# Patient Record
Sex: FEMALE | Race: ASIAN
[De-identification: names, ages, dates, MRNs, and addresses within clinical notes are randomized per-mention and may not be internally consistent; named-entity substitution may affect disease eponyms.]

---

## 2018-09-04 ENCOUNTER — HOSPITAL ENCOUNTER (EMERGENCY)
Dept: HOSPITAL 47 - EC | Age: 83
Discharge: HOME | End: 2018-09-04
Payer: MEDICARE

## 2018-09-04 VITALS
RESPIRATION RATE: 18 BRPM | HEART RATE: 61 BPM | TEMPERATURE: 97.8 F | DIASTOLIC BLOOD PRESSURE: 72 MMHG | SYSTOLIC BLOOD PRESSURE: 172 MMHG

## 2018-09-04 DIAGNOSIS — R40.2142: ICD-10-CM

## 2018-09-04 DIAGNOSIS — S09.90XA: Primary | ICD-10-CM

## 2018-09-04 DIAGNOSIS — E11.9: ICD-10-CM

## 2018-09-04 DIAGNOSIS — R40.2252: ICD-10-CM
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
## 2018-09-04 DIAGNOSIS — R40.2362: ICD-10-CM

## 2018-09-04 DIAGNOSIS — M25.561: ICD-10-CM
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
## 2018-09-04 DIAGNOSIS — Z79.899: ICD-10-CM

## 2018-09-04 DIAGNOSIS — Z79.84: ICD-10-CM

## 2018-09-04 PROCEDURE — 70486 CT MAXILLOFACIAL W/O DYE: CPT

## 2018-09-04 PROCEDURE — 99284 EMERGENCY DEPT VISIT MOD MDM: CPT

## 2018-09-04 PROCEDURE — 72125 CT NECK SPINE W/O DYE: CPT

## 2018-09-04 PROCEDURE — 70450 CT HEAD/BRAIN W/O DYE: CPT

## 2018-09-04 NOTE — CT
EXAMINATION TYPE: CT facial bones wo con

 

DATE OF EXAM: 9/4/2018

 

COMPARISON: None

 

HISTORY: fall

 

CT DLP: 429.4 mGycm

Automated exposure control for dose reduction was used.

 

TECHNIQUE: CT scan of the sinuses is performed without contrast, axial images are obtained, coronal r
eformatted images are also reviewed.

 

FINDINGS: The  paranasal sinuses including the frontal, ethmoid, sphenoid, and maxillary sinuses bila
terally are well-aerated mild changes of chronic sinusitis.  The ostiomeatal complex is patent bilate
rally on the coronal images. 

 

Visualized portion of mastoid air cells show no abnormal opacification.  The globes are intact bilate
rally.  

 

IMPRESSION: 

1. No acute fracture.

2. Changes of mild chronic sinusitis.

## 2018-09-04 NOTE — ED
General Adult HPI





- General


Chief complaint: Fall


Stated complaint: Fall


Time Seen by Provider: 09/04/18 10:09


Source: patient, family, RN notes reviewed


Mode of arrival: wheelchair


Limitations: language barrier





- History of Present Illness


Initial comments: 





84-year-old female presents to the emergency department for a chief complaint 

of fall occurring one day ago.  Family helped to translate for patient.  

Apparently, patient was walking in the living room yesterday when she fell on 

her right knee.  Patient has had pain in the right knee since that time.  

Patient denies any pain in the foot ankle or right hip.  Patient denies any 

back pain.  Patient does admit to hitting her head against the windowsill as 

well as her face.  Patient denies being on blood thinners.  Patient denies any 

loss of consciousness.  Patient denies any headache at this time.  Patient 

recently had eye surgery. Patient has no other complaints at this time 

including shortness of breath, chest pain, abdominal pain, nausea or vomiting, 

headache, or visual changes.


Location: lower extremity (right knee)


Radiation: non-radiation


Severity scale (1-10): 6


Quality: aching


Consistency: constant, other


Worsens with: movement (flexion/weight bearing)


Associated Symptoms: denies other symptoms





- Related Data


 Home Medications











 Medication  Instructions  Recorded  Confirmed


 


ALPRAZolam [Xanax] 0.5 mg PO HS 09/04/18 09/04/18


 


Atorvastatin [Lipitor] 10 mg PO HS 09/04/18 09/04/18


 


Ferrous Sulfate [Feosol] 325 mg PO DAILY 09/04/18 09/04/18


 


Glucerna Shake 1 can PO BID-W/MEALS 09/04/18 09/04/18


 


Valsartan [Diovan] 80 mg PO DAILY 09/04/18 09/04/18


 


metFORMIN HCL [Glucophage] 500 mg PO DAILY 09/04/18 09/04/18











 Allergies











Allergy/AdvReac Type Severity Reaction Status Date / Time


 


No Known Allergies Allergy   Verified 09/04/18 10:05














Review of Systems


ROS Statement: 


Those systems with pertinent positive or pertinent negative responses have been 

documented in the HPI.





ROS Other: All systems not noted in ROS Statement are negative.





Past Medical History


Past Medical History: Diabetes Mellitus


History of Any Multi-Drug Resistant Organisms: None Reported


Past Psychological History: No Psychological Hx Reported


Smoking Status: Never smoker


Past Alcohol Use History: None Reported


Past Drug Use History: None Reported





General Exam


Limitations: language barrier


General appearance: alert


Head exam: Present: atraumatic, normocephalic, normal inspection, other (

ecchymosis to nasal bridge, forehead )


Eye exam: Present: normal appearance, PERRL, EOMI, other (negative raccoon sign)

.  Absent: scleral icterus, conjunctival injection, nystagmus, periorbital 

swelling, periorbital tenderness


ENT exam: Present: normal exam, normal oropharynx, mucous membranes moist, TM's 

normal bilaterally, normal external ear exam (negative frye sign)


Neck exam: Present: normal inspection, full ROM.  Absent: tenderness, 

meningismus, lymphadenopathy


Respiratory exam: Present: normal lung sounds bilaterally.  Absent: respiratory 

distress, wheezes, rales, rhonchi, stridor


Cardiovascular Exam: Present: regular rate, normal rhythm, normal heart sounds.

  Absent: systolic murmur, diastolic murmur, rubs, gallop, clicks


GI/Abdominal exam: Present: soft, normal bowel sounds.  Absent: distended, 

tenderness, guarding, rebound, rigid


Extremities exam: Present: tenderness (Mild tenderness to the right knee, 

generalized.  No tenderness to the right hip, femur, tib-fib, ankle or foot.), 

normal capillary refill (Capillary refill less than 2 seconds and pedal pulse 2

+ in the right lower extremity), other (Sensation intact in right lower 

extremity.).  Absent: full ROM (Patient has full range of motion of the right 

hip or ankle and foot.  Patient has 90 flexion of the right knee with 

increased pain with flexion.), pedal edema, joint swelling (No erythema or 

edema noted to the right knee, no ecchymosis.), calf tenderness (No tenderness 

in the right calf, no erythema, edema, or increased warmth compared to left 

calf.)


Back exam: Absent: tenderness


Neurological exam: Present: alert, oriented X3, CN II-XII intact, normal gait.  

Absent: motor sensory deficit


  ** Expanded


Patient oriented to: Present: person, place, time


Speech: Present: fluid speech


Eye Response: (4) open spontaneously


Motor Response: (6) obeys commands


Verbal Response: (5) oriented


Molly Total: 15


Psychiatric exam: Present: normal affect, normal mood





Course


 Vital Signs











  09/04/18





  10:02


 


Temperature 98.1 F


 


Pulse Rate 64


 


Respiratory 20





Rate 


 


Blood Pressure 149/70


 


O2 Sat by Pulse 98





Oximetry 














Medical Decision Making





- Medical Decision Making





84-year-old female presents to the emergency department for a chief complaint 

of fall occurring yesterday.  Patient complains of pain in the right knee 

worsened with ambulation and flexion of the knee.  Patient also states she had 

her head on a windowsill.  No focal neuro deficits.  GCS 15.  Patient is 

ambulating in the emergency department. X-ray of the right knee shows no 

fracture or dislocation evident.  Suprapatellar joint effusion suspected.  CT 

facial bones shows no acute fracture.  CT brain shows no acute fracture or 

dislocation evident in the cervical spine.  No acute intracranial hemorrhage, 

mass effect or midline shift seen on brain CT.  Degenerative and nonspecific 

white matter changes most typical for old remote microvascular ischemia.  At 

this time patient can follow up outpatient.  I did offer a orthopedic referral 

which family refused and stated they would follow up with primary care.  Ace 

wrap was applied to the right knee.  On reevaluation patient is in no distress 

and is alert and pleasant.  She will return to the emergency Department if she 

has any worsening symptoms.  Otherwise she will rest ice and elevate the right 

knee and follow-up with primary care in 1-2 days.





Disposition


Clinical Impression: 


 Head injury, Knee pain, right





Disposition: HOME SELF-CARE


Condition: Good


Instructions:  Fall Prevention for Older Adults (ED), Knee Pain (ED)


Additional Instructions: 


Please rest, ice, and elevate the right knee.  Use Ace wrap as needed.  Monitor 

for worsening symptoms such as headache, confusion.  Follow-up with primary 

care in 1-2 days.  Return to the emergency department if you have any worsening 

symptoms.


Is patient prescribed a controlled substance at d/c from ED?: No


Referrals: 


Kamlesh Rmaos MD [Primary Care Provider] - 1-2 days


Time of Disposition: 11:32

## 2018-09-04 NOTE — XR
Right knee

 

HISTORY: Trauma and pain

 

3 views of the right knee

 

Marginal spurring is present in the medial compartment with joint space loss. Alignment is maintained
. Bone mineralization is reduced. Marginal spurring also present at the patellofemoral joint, suprapa
tellar joint effusion suspected. There is overlying artifact present.

 

IMPRESSION: No fracture or dislocation is evident, follow-up as indicated.

## 2018-09-04 NOTE — CT
EXAMINATION TYPE: CT brain cspine wo con

 

DATE OF EXAM: 9/4/2018

 

COMPARISON: None

 

HISTORY: fall

 

CT DLP: 1774.2 mGycm

Automated exposure control for dose reduction was used.

 

TECHNIQUE: CT scan of the head and cervical spine are performed without contrast.

 

FINDINGS:   Moderate generalized degenerative change of the greater frontal component. Patchy conflue
nt areas of low-attenuation are compatible with old microvascular ischemia.

 

Calvarium intact

No acute hemorrhage. Changes of chronic sinusitis noted. Intracranial atherosclerotic changes are see
n.

 

Anatomic alignment noted. Assessment spinal canal limited due to resolution I, technique and artifact
. Multilevel mild degenerative disc disease and facet arthropathy. Odontoid intact.

 

IMPRESSION:

1. There is no acute fracture or dislocation evident in the cervical spine. Multilevel degenerative d
isc disease and facet arthropathy.

2. No acute intracranial hemorrhage, mass effect, or midline shift is seen. Degenerative and nonspeci
fic white matter changes most typical remote microvascular ischemia.

## 2019-08-12 ENCOUNTER — HOSPITAL ENCOUNTER (INPATIENT)
Dept: HOSPITAL 47 - EC | Age: 84
LOS: 3 days | Discharge: HOME HEALTH | DRG: 292 | End: 2019-08-15
Payer: MEDICARE

## 2019-08-12 VITALS — BODY MASS INDEX: 18.8 KG/M2

## 2019-08-12 DIAGNOSIS — I11.0: Primary | ICD-10-CM

## 2019-08-12 DIAGNOSIS — I50.9: ICD-10-CM

## 2019-08-12 DIAGNOSIS — Z79.899: ICD-10-CM

## 2019-08-12 DIAGNOSIS — Z71.6: ICD-10-CM

## 2019-08-12 DIAGNOSIS — N17.9: ICD-10-CM

## 2019-08-12 DIAGNOSIS — R26.81: ICD-10-CM

## 2019-08-12 DIAGNOSIS — F17.210: ICD-10-CM

## 2019-08-12 DIAGNOSIS — Z79.82: ICD-10-CM

## 2019-08-12 DIAGNOSIS — I08.3: ICD-10-CM

## 2019-08-12 DIAGNOSIS — E78.5: ICD-10-CM

## 2019-08-12 DIAGNOSIS — D64.9: ICD-10-CM

## 2019-08-12 DIAGNOSIS — H54.62: ICD-10-CM

## 2019-08-12 DIAGNOSIS — I48.91: ICD-10-CM

## 2019-08-12 DIAGNOSIS — I27.20: ICD-10-CM

## 2019-08-12 DIAGNOSIS — E11.9: ICD-10-CM

## 2019-08-12 DIAGNOSIS — Z79.84: ICD-10-CM

## 2019-08-12 DIAGNOSIS — I42.9: ICD-10-CM

## 2019-08-12 LAB
ALBUMIN SERPL-MCNC: 4.1 G/DL (ref 3.5–5)
ALP SERPL-CCNC: 44 U/L (ref 38–126)
ALT SERPL-CCNC: 6 U/L (ref 9–52)
ANION GAP SERPL CALC-SCNC: 13 MMOL/L
APTT BLD: 27.3 SEC (ref 22–30)
AST SERPL-CCNC: 30 U/L (ref 14–36)
BASOPHILS # BLD AUTO: 0 K/UL (ref 0–0.2)
BASOPHILS NFR BLD AUTO: 1 %
BUN SERPL-SCNC: 18 MG/DL (ref 7–17)
CALCIUM SPEC-MCNC: 9.3 MG/DL (ref 8.4–10.2)
CHLORIDE SERPL-SCNC: 106 MMOL/L (ref 98–107)
CK SERPL-CCNC: 43 U/L (ref 30–135)
CO2 SERPL-SCNC: 22 MMOL/L (ref 22–30)
EOSINOPHIL # BLD AUTO: 0.3 K/UL (ref 0–0.7)
EOSINOPHIL NFR BLD AUTO: 6 %
ERYTHROCYTE [DISTWIDTH] IN BLOOD BY AUTOMATED COUNT: 5.68 M/UL (ref 3.8–5.4)
ERYTHROCYTE [DISTWIDTH] IN BLOOD: 16.7 % (ref 11.5–15.5)
GLUCOSE BLD-MCNC: 100 MG/DL (ref 75–99)
GLUCOSE BLD-MCNC: 91 MG/DL (ref 75–99)
GLUCOSE SERPL-MCNC: 166 MG/DL (ref 74–99)
HCT VFR BLD AUTO: 43.3 % (ref 34–46)
HGB BLD-MCNC: 12.3 GM/DL (ref 11.4–16)
HYALINE CASTS UR QL AUTO: 21 /LPF (ref 0–2)
INR PPP: 1.2 (ref ?–1.2)
LYMPHOCYTES # SPEC AUTO: 1.3 K/UL (ref 1–4.8)
LYMPHOCYTES NFR SPEC AUTO: 29 %
MAGNESIUM SPEC-SCNC: 1.7 MG/DL (ref 1.6–2.3)
MCH RBC QN AUTO: 21.7 PG (ref 25–35)
MCHC RBC AUTO-ENTMCNC: 28.4 G/DL (ref 31–37)
MCV RBC AUTO: 76.3 FL (ref 80–100)
MONOCYTES # BLD AUTO: 0.2 K/UL (ref 0–1)
MONOCYTES NFR BLD AUTO: 5 %
NEUTROPHILS # BLD AUTO: 2.6 K/UL (ref 1.3–7.7)
NEUTROPHILS NFR BLD AUTO: 58 %
PH UR: 5.5 [PH] (ref 5–8)
PLATELET # BLD AUTO: 157 K/UL (ref 150–450)
POTASSIUM SERPL-SCNC: 4.4 MMOL/L (ref 3.5–5.1)
PROT SERPL-MCNC: 7.6 G/DL (ref 6.3–8.2)
PT BLD: 12.6 SEC (ref 9–12)
RBC UR QL: 1 /HPF (ref 0–5)
SODIUM SERPL-SCNC: 141 MMOL/L (ref 137–145)
SP GR UR: 1.01 (ref 1–1.03)
SQUAMOUS UR QL AUTO: <1 /HPF (ref 0–4)
UROBILINOGEN UR QL STRIP: <2 MG/DL (ref ?–2)
WBC # BLD AUTO: 4.5 K/UL (ref 3.8–10.6)
WBC #/AREA URNS HPF: 1 /HPF (ref 0–5)

## 2019-08-12 PROCEDURE — 83735 ASSAY OF MAGNESIUM: CPT

## 2019-08-12 PROCEDURE — 71046 X-RAY EXAM CHEST 2 VIEWS: CPT

## 2019-08-12 PROCEDURE — 85610 PROTHROMBIN TIME: CPT

## 2019-08-12 PROCEDURE — 96374 THER/PROPH/DIAG INJ IV PUSH: CPT

## 2019-08-12 PROCEDURE — 96372 THER/PROPH/DIAG INJ SC/IM: CPT

## 2019-08-12 PROCEDURE — 82550 ASSAY OF CK (CPK): CPT

## 2019-08-12 PROCEDURE — 83036 HEMOGLOBIN GLYCOSYLATED A1C: CPT

## 2019-08-12 PROCEDURE — 84484 ASSAY OF TROPONIN QUANT: CPT

## 2019-08-12 PROCEDURE — 93306 TTE W/DOPPLER COMPLETE: CPT

## 2019-08-12 PROCEDURE — 83605 ASSAY OF LACTIC ACID: CPT

## 2019-08-12 PROCEDURE — 80053 COMPREHEN METABOLIC PANEL: CPT

## 2019-08-12 PROCEDURE — 83880 ASSAY OF NATRIURETIC PEPTIDE: CPT

## 2019-08-12 PROCEDURE — 93005 ELECTROCARDIOGRAM TRACING: CPT

## 2019-08-12 PROCEDURE — 99285 EMERGENCY DEPT VISIT HI MDM: CPT

## 2019-08-12 PROCEDURE — 87086 URINE CULTURE/COLONY COUNT: CPT

## 2019-08-12 PROCEDURE — 81001 URINALYSIS AUTO W/SCOPE: CPT

## 2019-08-12 PROCEDURE — 36415 COLL VENOUS BLD VENIPUNCTURE: CPT

## 2019-08-12 PROCEDURE — 85025 COMPLETE CBC W/AUTO DIFF WBC: CPT

## 2019-08-12 PROCEDURE — 85730 THROMBOPLASTIN TIME PARTIAL: CPT

## 2019-08-12 RX ADMIN — NITROGLYCERIN SCH: 20 OINTMENT TOPICAL at 17:28

## 2019-08-12 RX ADMIN — LATANOPROST SCH DROPS: 50 SOLUTION OPHTHALMIC at 20:08

## 2019-08-12 RX ADMIN — FUROSEMIDE SCH MG: 10 INJECTION, SOLUTION INTRAMUSCULAR; INTRAVENOUS at 14:22

## 2019-08-12 RX ADMIN — NITROGLYCERIN SCH INCH: 20 OINTMENT TOPICAL at 20:07

## 2019-08-12 RX ADMIN — VALSARTAN SCH MG: 80 TABLET ORAL at 20:07

## 2019-08-12 RX ADMIN — HEPARIN SODIUM SCH UNIT: 5000 INJECTION, SOLUTION INTRAVENOUS; SUBCUTANEOUS at 20:08

## 2019-08-12 RX ADMIN — FUROSEMIDE SCH MG: 10 INJECTION, SOLUTION INTRAMUSCULAR; INTRAVENOUS at 20:08

## 2019-08-12 RX ADMIN — HEPARIN SODIUM SCH UNIT: 5000 INJECTION, SOLUTION INTRAVENOUS; SUBCUTANEOUS at 14:22

## 2019-08-12 RX ADMIN — SODIUM CHLORIDE, PRESERVATIVE FREE SCH ML: 5 INJECTION INTRAVENOUS at 20:07

## 2019-08-12 NOTE — P.HPIM
History of Present Illness


H&P Date: 08/12/19


Chief Complaint: Weakness and shortness of breath





Patient is an 85-year-old female well known to my practice who presented to 

[facility] emergency room with a chief complaint of weakness 

and shortness of breath.  Patient speaks very little English, her 2 daughters 

are in the room and provided most of the history, they states that patient was 

visiting family members in [place] about 1 month ago at that time she started 

having some weakness and shortness of breath, and her symptoms have increased 

gradually over the last month.  She was seen at an urgent care clinic and was 

diagnosed with anemia and was given iron pills , her symptoms continued to wors

en and family decided to bring her to emergency room .


Patient was evaluated by Dr. Sanchez in the emergency room , she had evidence of 

atrial fibrillation and evidence of pulmonary congestion with elevated BNP and 

chest x-ray findings suggestive of cardiomegaly with pulmonary venous congestion

, she was started on IV Lasix and was admitted to telemetry floor cardiology 

consultation was requested .


Patient had episodes of anemia on and off over the years , she was counseled to 

have a colonoscopy however she refused repeatedly , she was approached again 

about a colonoscopy and she refused today .


Hemoglobin at this time is normal at 12.3 





Past Medical History


Past Medical History: Diabetes Mellitus, Hyperlipidemia, Hypertension


Additional Past Medical History / Comment(s): cardiomegaly


History of Any Multi-Drug Resistant Organisms: None Reported


Additional Past Surgical History / Comment(s): eye surgery


Past Psychological History: No Psychological Hx Reported


Smoking Status: Light tobacco smoker


Past Alcohol Use History: None Reported


Past Drug Use History: None Reported





Medications and Allergies


                                Home Medications











 Medication  Instructions  Recorded  Confirmed  Type


 


ALPRAZolam [Xanax] 0.5 mg PO HS PRN 09/04/18 08/12/19 History


 


Atorvastatin [Lipitor] 10 mg PO HS 09/04/18 08/12/19 History


 


Ferrous Sulfate [Feosol] 325 mg PO DAILY 09/04/18 08/12/19 History


 


Valsartan [Diovan] 80 mg PO DAILY 09/04/18 08/12/19 History


 


metFORMIN HCL [Glucophage] 500 mg PO BID 09/04/18 08/12/19 History


 


Bimatoprost [Lumigan .01% Ophth 1 drop BOTH EYES HS 08/12/19 08/12/19 History





Soln]    


 


Latanoprost Ophth [Xalatan 0.005%] 1 drop BOTH EYES HS 08/12/19 08/12/19 History


 


Multivitamins, Thera [Multivitamin 1 tab PO DAILY 08/12/19 08/12/19 History





(formulary)]    








                                    Allergies











Allergy/AdvReac Type Severity Reaction Status Date / Time


 


No Known Allergies Allergy   Verified 08/12/19 11:49














Physical Exam


Vitals: 


                                   Vital Signs











  Temp Pulse Pulse Resp BP BP Pulse Ox


 


 08/12/19 16:46  97 F L   67  18   127/75  98


 


 08/12/19 15:52  97.7 F  60   17  98/66   98


 


 08/12/19 13:27   65   17  123/70   97


 


 08/12/19 11:01  97.5 F L  70   18  112/75   99








                                Intake and Output











 08/12/19 08/12/19 08/12/19





 06:59 14:59 22:59


 


Output Total   1


 


Balance   -1


 


Output:   


 


  Urine   1


 


Other:   


 


  Weight  48.081 kg 48.1 kg














In general patient is alert responsive in no apparent distress answer questions 

appropriately her daughter's help with translation


HEENT head normocephalic and atraumatic


Neck is supple no JVD no goiter no lymphadenopathy


Chest exam reveals a scattered crackles in both bases no wheezing


Cardiac exam reveals regular heart sounds S1 and S2 no gallops no murmurs


Abdomen is soft nontender no organomegaly with normal bowel sounds


Extremity exam reveals mild edema in both feet no cyanosis or clubbing


Neurological examination reveals no gross focal deficit





Results


CBC & Chem 7: 


                                 08/12/19 11:24





                                 08/12/19 11:24


Labs: 


                  Abnormal Lab Results - Last 24 Hours (Table)











  08/12/19 08/12/19 08/12/19 Range/Units





  11:24 11:24 11:24 


 


RBC  5.68 H    (3.80-5.40)  m/uL


 


MCV  76.3 L    (80.0-100.0)  fL


 


MCH  21.7 L    (25.0-35.0)  pg


 


MCHC  28.4 L    (31.0-37.0)  g/dL


 


RDW  16.7 H    (11.5-15.5)  %


 


PT    12.6 H  (9.0-12.0)  sec


 


INR    1.2 H  (<1.2)  


 


BUN   18 H   (7-17)  mg/dL


 


Creatinine   1.29 H   (0.52-1.04)  mg/dL


 


Glucose   166 H   (74-99)  mg/dL


 


POC Glucose (mg/dL)     (75-99)  mg/dL


 


Total Bilirubin   1.9 H   (0.2-1.3)  mg/dL


 


ALT   6 L   (9-52)  U/L


 


Urine Protein     (Negative)  


 


Ur Leukocyte Esterase     (Negative)  


 


Urine Bacteria     (None)  /hpf


 


Hyaline Casts     (0-2)  /lpf


 


Urine Mucus     (None)  /hpf














  08/12/19 08/12/19 Range/Units





  13:00 16:39 


 


RBC    (3.80-5.40)  m/uL


 


MCV    (80.0-100.0)  fL


 


MCH    (25.0-35.0)  pg


 


MCHC    (31.0-37.0)  g/dL


 


RDW    (11.5-15.5)  %


 


PT    (9.0-12.0)  sec


 


INR    (<1.2)  


 


BUN    (7-17)  mg/dL


 


Creatinine    (0.52-1.04)  mg/dL


 


Glucose    (74-99)  mg/dL


 


POC Glucose (mg/dL)   100 H  (75-99)  mg/dL


 


Total Bilirubin    (0.2-1.3)  mg/dL


 


ALT    (9-52)  U/L


 


Urine Protein  Trace H   (Negative)  


 


Ur Leukocyte Esterase  Small H   (Negative)  


 


Urine Bacteria  Rare H   (None)  /hpf


 


Hyaline Casts  21 H   (0-2)  /lpf


 


Urine Mucus  Rare H   (None)  /hpf














Thrombosis Risk Factor Assmnt





- Choose All That Apply


Any of the Below Risk Factors Present?: Yes


Other Risk Factors: Yes


Each Risk Factor Represents 3 Points: Age 75 years or older


Thrombosis Risk Factor Assessment Total Risk Factor Score: 3


Thrombosis Risk Factor Assessment Level: Moderate Risk





Assessment and Plan


Plan: 





#1 acute congestive heart failure exacerbation with shortness of breath will 

obtain echocardiogram was consult cardiology patient was started on IV Lasix in 

the emergency room





#2 atrial fibrillation heart rate is well-controlled at this time, will discuss 

was cardiology risk versus benefit of anticoagulation, patient and her family 

denies any falls, however she had on and off episodes of anemia and she had 

refused any gastroenterology evaluation.





#3 underlying history of hypertension will monitor blood pressure and adjust 

medications as needed





#4 underlying history of hyperlipidemia maintained on Lipitor continue





#5 tobacco use patient was counseled regarding abstaining from the use of 

tobacco





#6 history of diabetes mellitus will check hemoglobin A1c to assess need for any

medication adjustment





#7 for DVT prophylaxis patient will be started on Lovenox 40 mg subcu once daily

Will monitor hemoglobin closely for GI prophylaxis patient will be given 

omeprazole

## 2019-08-12 NOTE — XR
EXAMINATION TYPE: XR chest 2V

 

DATE OF EXAM: 8/12/2019

 

COMPARISON: NONE

 

HISTORY: Shortness of breath

 

TECHNIQUE:  Frontal and lateral views of the chest are obtained.

 

FINDINGS:

 

Scattered senescent parenchymal changes noted. Hyperinflation compatible with COPD. 

 

There is evidence of cardiomegaly with small right-sided pleural effusion. Underlying atelectasis and
/or infiltrate difficult to exclude. Pulmonary vasculature is engorged without overt failure.

 

Mediastinal structures are stable and grossly unremarkable.

 

No evidence for hilar prominence.

 

Degenerative changes dorsal spine. 

 

IMPRESSION:

1. Cardiomegaly with pulmonary venous congestion without overt failure at this time. Small right basi
lar pleural effusion with associated atelectasis and/or infiltrate.

## 2019-08-12 NOTE — ED
General Adult HPI





- General


Chief complaint: Weakness


Stated complaint: bilat foot swelling, SOB


Time Seen by Provider: 08/12/19 11:13


Source: patient, family, RN notes reviewed


Mode of arrival: wheelchair


Limitations: language barrier (Family does help translate.)





- History of Present Illness


Initial comments: 





Patient is a pleasant 85-year-old female presenting to the emergency department 

with family for generalized weakness.  Symptoms have progressed over the past 

one week.  Patient feels weak in general.  Patient has exertional dyspnea.  Some

orthopnea.  Patient does have history of cardiomyopathy however no history of 

congestive heart failure.  Patient does have bilateral lower extremity leg edema

has also progressed.  No leg pain.  No confusion.  No isolated area of weakness.

 No fevers.  No chest pain.





- Related Data


                                Home Medications











 Medication  Instructions  Recorded  Confirmed


 


ALPRAZolam [Xanax] 0.5 mg PO HS PRN 09/04/18 08/12/19


 


Atorvastatin [Lipitor] 10 mg PO HS 09/04/18 08/12/19


 


Ferrous Sulfate [Feosol] 325 mg PO DAILY 09/04/18 08/12/19


 


Valsartan [Diovan] 80 mg PO DAILY 09/04/18 08/12/19


 


metFORMIN HCL [Glucophage] 500 mg PO BID 09/04/18 08/12/19


 


Bimatoprost [Lumigan .01% Ophth 1 drop BOTH EYES HS 08/12/19 08/12/19





Soln]   


 


Latanoprost Ophth [Xalatan 0.005%] 1 drop BOTH EYES HS 08/12/19 08/12/19


 


Multivitamins, Thera [Multivitamin 1 tab PO DAILY 08/12/19 08/12/19





(formulary)]   











                                    Allergies











Allergy/AdvReac Type Severity Reaction Status Date / Time


 


No Known Allergies Allergy   Verified 08/12/19 11:49














Review of Systems


ROS Statement: 


Those systems with pertinent positive or pertinent negative responses have been 

documented in the HPI.





ROS Other: All systems not noted in ROS Statement are negative.


Constitutional: Denies: fever


Eyes: Denies: eye pain


ENT: Denies: ear pain


Respiratory: Reports: dyspnea.  Denies: cough


Cardiovascular: Denies: chest pain


Endocrine: Reports: fatigue


Gastrointestinal: Denies: abdominal pain


Genitourinary: Denies: dysuria


Musculoskeletal: Denies: back pain


Skin: Denies: rash


Neurological: Reports: as per HPI, weakness (Generalized).  Denies: confusion





Past Medical History


Past Medical History: Diabetes Mellitus, Hyperlipidemia, Hypertension


Additional Past Medical History / Comment(s): cardiomegaly


History of Any Multi-Drug Resistant Organisms: None Reported


Additional Past Surgical History / Comment(s): eye surgery


Past Psychological History: No Psychological Hx Reported


Smoking Status: Light tobacco smoker


Past Alcohol Use History: None Reported


Past Drug Use History: None Reported





General Exam


Limitations: language barrier


General appearance: alert, in no apparent distress


Head exam: Present: atraumatic


Eye exam: Present: normal appearance, PERRL, EOMI.  Absent: nystagmus


ENT exam: Present: normal oropharynx


Neck exam: Present: normal inspection


Respiratory exam: Present: rales (Mild left base)


Cardiovascular Exam: Present: irregular rhythm, clicks


GI/Abdominal exam: Present: soft.  Absent: tenderness


Extremities exam: Present: pedal edema (+1 bilateral).  Absent: calf tenderness


Back exam: Present: normal inspection


Neurological exam: Present: alert


  ** Expanded


Neurological exam: Present: protecting the airway


Patient oriented to: Absent: time (Family states this is normal.  Patient states

2018.)


Cranial nerves: EOM's Intact: Normal


Motor strength exam: RUE: 5, LUE: 5, RLE: 5, LLE: 5


Eye Response: (4) open spontaneously


Motor Response: (6) obeys commands


Verbal Response: (4) confused conversation


Psychiatric exam: Present: normal affect, normal mood


Skin exam: Present: normal color





Course


                                   Vital Signs











  08/12/19 08/12/19





  11:01 13:27


 


Temperature 97.5 F L 


 


Pulse Rate 70 65


 


Respiratory 18 17





Rate  


 


Blood Pressure 112/75 123/70


 


O2 Sat by Pulse 99 97





Oximetry  














EKG Findings





- EKG Comments:


EKG Findings:: Atrial fibrillation with a rate of 64.  .  .  QTC 

468.  Axis and indeterminate.  Incomplete right bundle-branch block.  No acute 

ST change.





Medical Decision Making





- Medical Decision Making





Patient reevaluated and resting comfortably in bed.  Patient and family updated 

on results and plan.  Dr. Ramos has been paged for admission for his patient.





- Lab Data


Result diagrams: 


                                 08/12/19 11:24





                                 08/12/19 11:24


                                   Lab Results











  08/12/19 08/12/19 08/12/19 Range/Units





  11:24 11:24 11:24 


 


WBC  4.5    (3.8-10.6)  k/uL


 


RBC  5.68 H    (3.80-5.40)  m/uL


 


Hgb  12.3    (11.4-16.0)  gm/dL


 


Hct  43.3    (34.0-46.0)  %


 


MCV  76.3 L    (80.0-100.0)  fL


 


MCH  21.7 L    (25.0-35.0)  pg


 


MCHC  28.4 L    (31.0-37.0)  g/dL


 


RDW  16.7 H    (11.5-15.5)  %


 


Plt Count  157    (150-450)  k/uL


 


Neutrophils %  58    %


 


Lymphocytes %  29    %


 


Monocytes %  5    %


 


Eosinophils %  6    %


 


Basophils %  1    %


 


Neutrophils #  2.6    (1.3-7.7)  k/uL


 


Lymphocytes #  1.3    (1.0-4.8)  k/uL


 


Monocytes #  0.2    (0-1.0)  k/uL


 


Eosinophils #  0.3    (0-0.7)  k/uL


 


Basophils #  0.0    (0-0.2)  k/uL


 


Hypochromasia  Marked    


 


Anisocytosis  Slight    


 


Microcytosis  Slight    


 


PT    12.6 H  (9.0-12.0)  sec


 


INR    1.2 H  (<1.2)  


 


APTT    27.3  (22.0-30.0)  sec


 


Sodium   141   (137-145)  mmol/L


 


Potassium   4.4   (3.5-5.1)  mmol/L


 


Chloride   106   ()  mmol/L


 


Carbon Dioxide   22   (22-30)  mmol/L


 


Anion Gap   13   mmol/L


 


BUN   18 H   (7-17)  mg/dL


 


Creatinine   1.29 H   (0.52-1.04)  mg/dL


 


Est GFR (CKD-EPI)AfAm   44   (>60 ml/min/1.73 sqM)  


 


Est GFR (CKD-EPI)NonAf   38   (>60 ml/min/1.73 sqM)  


 


Glucose   166 H   (74-99)  mg/dL


 


Plasma Lactic Acid Jay Jay     (0.7-2.0)  mmol/L


 


Calcium   9.3   (8.4-10.2)  mg/dL


 


Magnesium   1.7   (1.6-2.3)  mg/dL


 


Total Bilirubin   1.9 H   (0.2-1.3)  mg/dL


 


AST   30   (14-36)  U/L


 


ALT   6 L   (9-52)  U/L


 


Alkaline Phosphatase   44   ()  U/L


 


Creatine Kinase   43   ()  U/L


 


Troponin I     (0.000-0.034)  ng/mL


 


NT-Pro-B Natriuret Pep     pg/mL


 


Total Protein   7.6   (6.3-8.2)  g/dL


 


Albumin   4.1   (3.5-5.0)  g/dL


 


Urine Color     


 


Urine Appearance     (Clear)  


 


Urine pH     (5.0-8.0)  


 


Ur Specific Gravity     (1.001-1.035)  


 


Urine Protein     (Negative)  


 


Urine Glucose (UA)     (Negative)  


 


Urine Ketones     (Negative)  


 


Urine Blood     (Negative)  


 


Urine Nitrite     (Negative)  


 


Urine Bilirubin     (Negative)  


 


Urine Urobilinogen     (<2.0)  mg/dL


 


Ur Leukocyte Esterase     (Negative)  


 


Urine RBC     (0-5)  /hpf


 


Urine WBC     (0-5)  /hpf


 


Ur Squamous Epith Cells     (0-4)  /hpf


 


Urine Bacteria     (None)  /hpf


 


Hyaline Casts     (0-2)  /lpf


 


Urine Mucus     (None)  /hpf














  08/12/19 08/12/19 08/12/19 Range/Units





  11:24 11:24 11:24 


 


WBC     (3.8-10.6)  k/uL


 


RBC     (3.80-5.40)  m/uL


 


Hgb     (11.4-16.0)  gm/dL


 


Hct     (34.0-46.0)  %


 


MCV     (80.0-100.0)  fL


 


MCH     (25.0-35.0)  pg


 


MCHC     (31.0-37.0)  g/dL


 


RDW     (11.5-15.5)  %


 


Plt Count     (150-450)  k/uL


 


Neutrophils %     %


 


Lymphocytes %     %


 


Monocytes %     %


 


Eosinophils %     %


 


Basophils %     %


 


Neutrophils #     (1.3-7.7)  k/uL


 


Lymphocytes #     (1.0-4.8)  k/uL


 


Monocytes #     (0-1.0)  k/uL


 


Eosinophils #     (0-0.7)  k/uL


 


Basophils #     (0-0.2)  k/uL


 


Hypochromasia     


 


Anisocytosis     


 


Microcytosis     


 


PT     (9.0-12.0)  sec


 


INR     (<1.2)  


 


APTT     (22.0-30.0)  sec


 


Sodium     (137-145)  mmol/L


 


Potassium     (3.5-5.1)  mmol/L


 


Chloride     ()  mmol/L


 


Carbon Dioxide     (22-30)  mmol/L


 


Anion Gap     mmol/L


 


BUN     (7-17)  mg/dL


 


Creatinine     (0.52-1.04)  mg/dL


 


Est GFR (CKD-EPI)AfAm     (>60 ml/min/1.73 sqM)  


 


Est GFR (CKD-EPI)NonAf     (>60 ml/min/1.73 sqM)  


 


Glucose     (74-99)  mg/dL


 


Plasma Lactic Acid Jay Jay    1.3  (0.7-2.0)  mmol/L


 


Calcium     (8.4-10.2)  mg/dL


 


Magnesium     (1.6-2.3)  mg/dL


 


Total Bilirubin     (0.2-1.3)  mg/dL


 


AST     (14-36)  U/L


 


ALT     (9-52)  U/L


 


Alkaline Phosphatase     ()  U/L


 


Creatine Kinase     ()  U/L


 


Troponin I  0.022    (0.000-0.034)  ng/mL


 


NT-Pro-B Natriuret Pep   49171   pg/mL


 


Total Protein     (6.3-8.2)  g/dL


 


Albumin     (3.5-5.0)  g/dL


 


Urine Color     


 


Urine Appearance     (Clear)  


 


Urine pH     (5.0-8.0)  


 


Ur Specific Gravity     (1.001-1.035)  


 


Urine Protein     (Negative)  


 


Urine Glucose (UA)     (Negative)  


 


Urine Ketones     (Negative)  


 


Urine Blood     (Negative)  


 


Urine Nitrite     (Negative)  


 


Urine Bilirubin     (Negative)  


 


Urine Urobilinogen     (<2.0)  mg/dL


 


Ur Leukocyte Esterase     (Negative)  


 


Urine RBC     (0-5)  /hpf


 


Urine WBC     (0-5)  /hpf


 


Ur Squamous Epith Cells     (0-4)  /hpf


 


Urine Bacteria     (None)  /hpf


 


Hyaline Casts     (0-2)  /lpf


 


Urine Mucus     (None)  /hpf














  08/12/19 Range/Units





  13:00 


 


WBC   (3.8-10.6)  k/uL


 


RBC   (3.80-5.40)  m/uL


 


Hgb   (11.4-16.0)  gm/dL


 


Hct   (34.0-46.0)  %


 


MCV   (80.0-100.0)  fL


 


MCH   (25.0-35.0)  pg


 


MCHC   (31.0-37.0)  g/dL


 


RDW   (11.5-15.5)  %


 


Plt Count   (150-450)  k/uL


 


Neutrophils %   %


 


Lymphocytes %   %


 


Monocytes %   %


 


Eosinophils %   %


 


Basophils %   %


 


Neutrophils #   (1.3-7.7)  k/uL


 


Lymphocytes #   (1.0-4.8)  k/uL


 


Monocytes #   (0-1.0)  k/uL


 


Eosinophils #   (0-0.7)  k/uL


 


Basophils #   (0-0.2)  k/uL


 


Hypochromasia   


 


Anisocytosis   


 


Microcytosis   


 


PT   (9.0-12.0)  sec


 


INR   (<1.2)  


 


APTT   (22.0-30.0)  sec


 


Sodium   (137-145)  mmol/L


 


Potassium   (3.5-5.1)  mmol/L


 


Chloride   ()  mmol/L


 


Carbon Dioxide   (22-30)  mmol/L


 


Anion Gap   mmol/L


 


BUN   (7-17)  mg/dL


 


Creatinine   (0.52-1.04)  mg/dL


 


Est GFR (CKD-EPI)AfAm   (>60 ml/min/1.73 sqM)  


 


Est GFR (CKD-EPI)NonAf   (>60 ml/min/1.73 sqM)  


 


Glucose   (74-99)  mg/dL


 


Plasma Lactic Acid Jay Jay   (0.7-2.0)  mmol/L


 


Calcium   (8.4-10.2)  mg/dL


 


Magnesium   (1.6-2.3)  mg/dL


 


Total Bilirubin   (0.2-1.3)  mg/dL


 


AST   (14-36)  U/L


 


ALT   (9-52)  U/L


 


Alkaline Phosphatase   ()  U/L


 


Creatine Kinase   ()  U/L


 


Troponin I   (0.000-0.034)  ng/mL


 


NT-Pro-B Natriuret Pep   pg/mL


 


Total Protein   (6.3-8.2)  g/dL


 


Albumin   (3.5-5.0)  g/dL


 


Urine Color  Yellow  


 


Urine Appearance  Clear  (Clear)  


 


Urine pH  5.5  (5.0-8.0)  


 


Ur Specific Gravity  1.013  (1.001-1.035)  


 


Urine Protein  Trace H  (Negative)  


 


Urine Glucose (UA)  Negative  (Negative)  


 


Urine Ketones  Negative  (Negative)  


 


Urine Blood  Negative  (Negative)  


 


Urine Nitrite  Negative  (Negative)  


 


Urine Bilirubin  Negative  (Negative)  


 


Urine Urobilinogen  <2.0  (<2.0)  mg/dL


 


Ur Leukocyte Esterase  Small H  (Negative)  


 


Urine RBC  1  (0-5)  /hpf


 


Urine WBC  1  (0-5)  /hpf


 


Ur Squamous Epith Cells  <1  (0-4)  /hpf


 


Urine Bacteria  Rare H  (None)  /hpf


 


Hyaline Casts  21 H  (0-2)  /lpf


 


Urine Mucus  Rare H  (None)  /hpf














- Radiology Data


Radiology results: image reviewed (Chest x-ray shows cardiomegaly and venous 

congestion.)





Disposition


Clinical Impression: 


 CHF (congestive heart failure), New onset a-fib





Disposition: ADMITTED AS IP TO THIS HOSP


Is patient prescribed a controlled substance at d/c from ED?: No


Referrals: 


Kamlesh Ramos MD [Primary Care Provider] - 1-2 days


Decision Time: 13:50

## 2019-08-13 LAB
ALBUMIN SERPL-MCNC: 3.7 G/DL (ref 3.5–5)
ALP SERPL-CCNC: 42 U/L (ref 38–126)
ALT SERPL-CCNC: 14 U/L (ref 9–52)
ANION GAP SERPL CALC-SCNC: 13 MMOL/L
AST SERPL-CCNC: 28 U/L (ref 14–36)
BASOPHILS # BLD AUTO: 0 K/UL (ref 0–0.2)
BASOPHILS NFR BLD AUTO: 1 %
BUN SERPL-SCNC: 21 MG/DL (ref 7–17)
CALCIUM SPEC-MCNC: 9.1 MG/DL (ref 8.4–10.2)
CHLORIDE SERPL-SCNC: 105 MMOL/L (ref 98–107)
CO2 SERPL-SCNC: 21 MMOL/L (ref 22–30)
EOSINOPHIL # BLD AUTO: 0.3 K/UL (ref 0–0.7)
EOSINOPHIL NFR BLD AUTO: 6 %
ERYTHROCYTE [DISTWIDTH] IN BLOOD BY AUTOMATED COUNT: 5.37 M/UL (ref 3.8–5.4)
ERYTHROCYTE [DISTWIDTH] IN BLOOD: 16.8 % (ref 11.5–15.5)
GLUCOSE BLD-MCNC: 103 MG/DL (ref 75–99)
GLUCOSE BLD-MCNC: 103 MG/DL (ref 75–99)
GLUCOSE BLD-MCNC: 106 MG/DL (ref 75–99)
GLUCOSE BLD-MCNC: 137 MG/DL (ref 75–99)
GLUCOSE SERPL-MCNC: 105 MG/DL (ref 74–99)
HCT VFR BLD AUTO: 41.4 % (ref 34–46)
HGB BLD-MCNC: 11.5 GM/DL (ref 11.4–16)
LYMPHOCYTES # SPEC AUTO: 1.7 K/UL (ref 1–4.8)
LYMPHOCYTES NFR SPEC AUTO: 32 %
MCH RBC QN AUTO: 21.3 PG (ref 25–35)
MCHC RBC AUTO-ENTMCNC: 27.6 G/DL (ref 31–37)
MCV RBC AUTO: 77.2 FL (ref 80–100)
MONOCYTES # BLD AUTO: 0.3 K/UL (ref 0–1)
MONOCYTES NFR BLD AUTO: 5 %
NEUTROPHILS # BLD AUTO: 2.9 K/UL (ref 1.3–7.7)
NEUTROPHILS NFR BLD AUTO: 55 %
PLATELET # BLD AUTO: 159 K/UL (ref 150–450)
POTASSIUM SERPL-SCNC: 4.3 MMOL/L (ref 3.5–5.1)
PROT SERPL-MCNC: 6.8 G/DL (ref 6.3–8.2)
SODIUM SERPL-SCNC: 139 MMOL/L (ref 137–145)
WBC # BLD AUTO: 5.2 K/UL (ref 3.8–10.6)

## 2019-08-13 RX ADMIN — ASPIRIN 325 MG ORAL TABLET SCH MG: 325 PILL ORAL at 12:11

## 2019-08-13 RX ADMIN — SODIUM CHLORIDE, PRESERVATIVE FREE SCH ML: 5 INJECTION INTRAVENOUS at 09:18

## 2019-08-13 RX ADMIN — VALSARTAN SCH MG: 80 TABLET ORAL at 20:23

## 2019-08-13 RX ADMIN — PANTOPRAZOLE SODIUM SCH MG: 40 TABLET, DELAYED RELEASE ORAL at 06:30

## 2019-08-13 RX ADMIN — HEPARIN SODIUM SCH UNIT: 5000 INJECTION, SOLUTION INTRAVENOUS; SUBCUTANEOUS at 20:28

## 2019-08-13 RX ADMIN — LATANOPROST SCH DROPS: 50 SOLUTION OPHTHALMIC at 20:25

## 2019-08-13 RX ADMIN — ATORVASTATIN CALCIUM SCH MG: 10 TABLET, FILM COATED ORAL at 09:16

## 2019-08-13 RX ADMIN — HEPARIN SODIUM SCH UNIT: 5000 INJECTION, SOLUTION INTRAVENOUS; SUBCUTANEOUS at 09:17

## 2019-08-13 RX ADMIN — NITROGLYCERIN SCH INCH: 20 OINTMENT TOPICAL at 09:16

## 2019-08-13 RX ADMIN — FUROSEMIDE SCH MG: 10 INJECTION, SOLUTION INTRAMUSCULAR; INTRAVENOUS at 09:17

## 2019-08-13 RX ADMIN — THERA TABS SCH EACH: TAB at 09:16

## 2019-08-13 RX ADMIN — SODIUM CHLORIDE, PRESERVATIVE FREE SCH ML: 5 INJECTION INTRAVENOUS at 20:35

## 2019-08-13 RX ADMIN — Medication SCH MG: at 09:16

## 2019-08-13 RX ADMIN — NITROGLYCERIN SCH: 20 OINTMENT TOPICAL at 20:25

## 2019-08-13 RX ADMIN — NITROGLYCERIN SCH INCH: 20 OINTMENT TOPICAL at 16:53

## 2019-08-13 RX ADMIN — NITROGLYCERIN SCH INCH: 20 OINTMENT TOPICAL at 12:11

## 2019-08-13 RX ADMIN — FUROSEMIDE SCH MG: 10 INJECTION, SOLUTION INTRAMUSCULAR; INTRAVENOUS at 20:28

## 2019-08-13 NOTE — P.PN
Subjective


Progress Note Date: 08/13/19








Patient is an 85-year-old female well known to my practice who presented to 

[facility] emergency room with a chief complaint of weakness 

and shortness of breath.  Patient speaks very little English, her 2 daughters 

are in the room and provided most of the history, they states that patient was 

visiting family members in [place] about 1 month ago at that time she started 

having some weakness and shortness of breath, and her symptoms have increased 

gradually over the last month.  She was seen at an urgent care clinic and was 

diagnosed with anemia and was given iron pills , her symptoms continued to 

worsen and family decided to bring her to emergency room .


Patient was evaluated by Dr. Sanchez in the emergency room , she had evidence of 

atrial fibrillation and evidence of pulmonary congestion with elevated BNP and 

chest x-ray findings suggestive of cardiomegaly with pulmonary venous congestion

, she was started on IV Lasix and was admitted to telemetry floor cardiology 

consultation was requested .


Patient had episodes of anemia on and off over the years , she was counseled to 

have a colonoscopy however she refused repeatedly , she was approached again 

about a colonoscopy and she refused today .


Hemoglobin at this time is normal at 12.3 





On 08/13/2019 patient is currently resting comfortably in bed.  Daughters are at

bedside and able to translate.  Patient reports that she is feeling slightly 

improved with her shortness of breath.  Patient denies chest pain.  Patient d

enies nausea vomiting or diarrhea.  Patient denies any urinary burning or 

frequency.  Patient remains on IV Lasix.  Cardiology services are following





Objective





- Vital Signs


Vital signs: 


                                   Vital Signs











Temp  97.5 F L  08/13/19 04:00


 


Pulse  74   08/13/19 08:00


 


Resp  16   08/13/19 08:00


 


BP  141/81   08/13/19 08:00


 


Pulse Ox  96   08/13/19 08:00








                                 Intake & Output











 08/12/19 08/13/19 08/13/19





 18:59 06:59 18:59


 


Intake Total  120 240


 


Output Total 401 750 


 


Balance -401 -630 240


 


Weight 48.1 kg 47.7 kg 


 


Intake:   


 


  Oral  120 240


 


Output:   


 


  Urine 401 750 


 


Other:   


 


  # Voids  2 














- Exam








In general patient is alert responsive in no apparent distress answer questions 

appropriately her daughter's help with translation


HEENT head normocephalic and atraumatic


Neck is supple no JVD no goiter no lymphadenopathy


Chest exam reveals a scattered crackles in both bases no wheezing


Cardiac exam reveals regular heart sounds S1 and S2 no gallops no murmurs


Abdomen is soft nontender no organomegaly with normal bowel sounds


Extremity exam reveals mild edema in both feet no cyanosis or clubbing


Neurological examination reveals no gross focal deficit





- Labs


CBC & Chem 7: 


                                 08/13/19 05:22





                                 08/13/19 05:22


Labs: 


                  Abnormal Lab Results - Last 24 Hours (Table)











  08/12/19 08/12/19 08/12/19 Range/Units





  11:24 11:24 11:24 


 


RBC  5.68 H    (3.80-5.40)  m/uL


 


MCV  76.3 L    (80.0-100.0)  fL


 


MCH  21.7 L    (25.0-35.0)  pg


 


MCHC  28.4 L    (31.0-37.0)  g/dL


 


RDW  16.7 H    (11.5-15.5)  %


 


PT    12.6 H  (9.0-12.0)  sec


 


INR    1.2 H  (<1.2)  


 


Carbon Dioxide     (22-30)  mmol/L


 


BUN   18 H   (7-17)  mg/dL


 


Creatinine   1.29 H   (0.52-1.04)  mg/dL


 


Glucose   166 H   (74-99)  mg/dL


 


POC Glucose (mg/dL)     (75-99)  mg/dL


 


Total Bilirubin   1.9 H   (0.2-1.3)  mg/dL


 


ALT   6 L   (9-52)  U/L


 


Urine Protein     (Negative)  


 


Ur Leukocyte Esterase     (Negative)  


 


Urine Bacteria     (None)  /hpf


 


Hyaline Casts     (0-2)  /lpf


 


Urine Mucus     (None)  /hpf














  08/12/19 08/12/19 08/13/19 Range/Units





  13:00 16:39 05:22 


 


RBC     (3.80-5.40)  m/uL


 


MCV    77.2 L  (80.0-100.0)  fL


 


MCH    21.3 L  (25.0-35.0)  pg


 


MCHC    27.6 L  (31.0-37.0)  g/dL


 


RDW    16.8 H  (11.5-15.5)  %


 


PT     (9.0-12.0)  sec


 


INR     (<1.2)  


 


Carbon Dioxide     (22-30)  mmol/L


 


BUN     (7-17)  mg/dL


 


Creatinine     (0.52-1.04)  mg/dL


 


Glucose     (74-99)  mg/dL


 


POC Glucose (mg/dL)   100 H   (75-99)  mg/dL


 


Total Bilirubin     (0.2-1.3)  mg/dL


 


ALT     (9-52)  U/L


 


Urine Protein  Trace H    (Negative)  


 


Ur Leukocyte Esterase  Small H    (Negative)  


 


Urine Bacteria  Rare H    (None)  /hpf


 


Hyaline Casts  21 H    (0-2)  /lpf


 


Urine Mucus  Rare H    (None)  /hpf














  08/13/19 08/13/19 Range/Units





  05:22 07:00 


 


RBC    (3.80-5.40)  m/uL


 


MCV    (80.0-100.0)  fL


 


MCH    (25.0-35.0)  pg


 


MCHC    (31.0-37.0)  g/dL


 


RDW    (11.5-15.5)  %


 


PT    (9.0-12.0)  sec


 


INR    (<1.2)  


 


Carbon Dioxide  21 L   (22-30)  mmol/L


 


BUN  21 H   (7-17)  mg/dL


 


Creatinine  1.20 H   (0.52-1.04)  mg/dL


 


Glucose  105 H   (74-99)  mg/dL


 


POC Glucose (mg/dL)   106 H  (75-99)  mg/dL


 


Total Bilirubin  1.6 H   (0.2-1.3)  mg/dL


 


ALT    (9-52)  U/L


 


Urine Protein    (Negative)  


 


Ur Leukocyte Esterase    (Negative)  


 


Urine Bacteria    (None)  /hpf


 


Hyaline Casts    (0-2)  /lpf


 


Urine Mucus    (None)  /hpf














Assessment and Plan


Assessment: 





#1 acute congestive heart failure exacerbation with shortness of breath will 

obtain echocardiogram was consult cardiology patient was started on IV Lasix in 

the emergency room.  2-D echo has been ordered





#2 atrial fibrillation heart rate is well-controlled at this time, will discuss 

was cardiology risk versus benefit of anticoagulation, patient and her family 

denies any falls, however she had on and off episodes of anemia and she had 

refused any gastroenterology evaluation.  Per cardiology would not recommend 

starting patient on oral anticoagulation per cardiology she's a fairly 

5-year-old with visual problems an unsteady gait the risk of falling and 

bleeding is significant. 





#3 underlying history of hypertension will monitor blood pressure and adjust 

medications as needed





#4 underlying history of hyperlipidemia maintained on Lipitor continue





#5 tobacco use patient was counseled regarding abstaining from the use of 

tobacco





#6 history of diabetes mellitus will check hemoglobin A1c to assess need for any

medication adjustment





#7 acute kidney injury.  Creatinine improving to 1.20 we'll continue to monitor





#8 small amount of leukocyte Estrace in urine.  Patient is asymptomatic white 

blood cells within normal limits afebrile urine culture has been ordered.





DVT prophylaxis heparin.  GI prophylaxis Protonix








I performed an examination of the patient and discussed their management with 

the Nurse Practitioner.  I have reviewed the Nurse Practitioner's notes and 

agree with the documented findings and plan of care

## 2019-08-13 NOTE — ECHOF
Referral Reason:dyspnea



MEASUREMENTS

--------

HEIGHT: 152.4 cm

WEIGHT: 49.4 kg

BP: 127/55

IVSd:   1.1 cm     (0.6 - 1.1)

LVIDd:   2.7 cm     (3.9 - 5.3)

LVPWd:   2.0 cm     (0.6 - 1.1)

IVSs:   1.7 cm

LVIDs:   2.4 cm

LVPWs:   1.9 cm

LA Diam:   4.7 cm     (2.7 - 3.8)

RVIDd:   4.1 cm     (< 3.3)

LAESV Index (A-L):   63.99 ml/m

Ao Diam:   2.4 cm     (2.0 - 3.7)

LA Diam:   5.3 cm     (2.7 - 3.8)

AV Cusp:   0.9 cm     (1.5 - 2.6)

EPSS:   0.6 cm

MV E Bhargav:   1.13 m/s

MV DecT:   276 ms

MV A Bhargav:   0.55 m/s

MV E/A Ratio:   2.03 

AV maxP.81 mmHg

AV meanP.04 mmHg

RAP:   15.00 mmHg

RVSP:   89.49 mmHg

MV EF SLOPE:   39.88 mm/s     (70 - 150)

MV EXCURSION:   10.76 mm     (> 18.000)







FINDINGS

--------

Undetermined rhythm.

This was a technically good study.

The left ventricular size is normal.   There is mild concentric left ventricular hypertrophy.   Overa
ll left ventricular systolic function is low-normal with, an EF between 50 - 55 %.

The right ventricle is severely enlarged.   The right ventricular septal wall is flattened in diastol
e and systole which is consistent  with right ventricular volume and pressure overload.

The left atrium is markedly dilated.   LA is severely dilated >40 ml/m2

The right atrial size is normal.

There is mild aortic regurgitation.   There is moderate aortic stenosis present.   Peak/mean gradient
 across the Aortic Valve is 26.81mmHg / 14.04mmHg.

Mild mitral regurgitation is present.    The peak and mean MV gradients are 12.57mmHg 3.66mmHg as makeda
sured by doppler.   Mild-to-moderate mitral stenosis.

Severe tricuspid regurgitation present.   There is severe pulmonary hypertension.   The right ventric
ular systolic pressure, as measured by Doppler, is 89.49mmHg.

Trace/mild (physiologic)  pulmonic regurgitation.

The aortic root size is normal.

The inferior vena cava is mildly dilated.

There is a small, generalized pericardial effusion present.



CONCLUSIONS

--------

1. This was a technically good study.

2. The left ventricular size is normal.

3. There is mild concentric left ventricular hypertrophy.

4. Overall left ventricular systolic function is low-normal with, an EF between 50 - 55 %.

5. The right ventricle is severely enlarged.

6. The right ventricular septal wall is flattened in diastole and systole which is consistent  with r
ight ventricular volume and pressure overload.

7. The left atrium is markedly dilated.

8. LA is severely dilated >40 ml/m2

9. The right atrial size is normal.

10. There is mild aortic regurgitation.

11. There is moderate aortic stenosis present.

12. Peak/mean gradient across the Aortic Valve is 26.81mmHg / 14.04mmHg.

13. Mild mitral regurgitation is present.

14. The peak and mean MV gradients are 12.57mmHg 3.66mmHg as measured by doppler.

15. Mild-to-moderate mitral stenosis.

16. Severe tricuspid regurgitation present.

17. There is severe pulmonary hypertension.

18. The right ventricular systolic pressure, as measured by Doppler, is 89.49mmHg.

19. Trace/mild (physiologic)  pulmonic regurgitation.

20. The aortic root size is normal.

21. The inferior vena cava is mildly dilated.

22. There is a small, generalized pericardial effusion present.





SONOGRAPHER: Margarita Felix RDCS

## 2019-08-13 NOTE — P.CRDCN
History of Present Illness


Consult date: 08/13/19


Chief complaint: Shortness of breath


History of present illness: 





This is a pleasant 85-year-old female patient who does not speak English was bro

ught by her family to the emergency room for increasing shortness of breath as 

well as increasing weakness.  The patient does not speak English very well, and 

I was able to take history from her 2 daughters in the room.  The patient lives 

with her daughter at home.  She is blind in the left eye and she has very 

decreased vision in the right eye as well.  Otherwise she walks on her own but 

her gait is not very steady according to HER-2 daughters.  For the last few 

days, she has been experiencing progressive weakness as well as progressive 

fatigue and progressive shortness of breath.  Her daughters felt that the 

patient was very congested.  Because of that they brought her to the emergency 

room.  No indication of any chest pain or chest discomfort, fever, or chills, 

cough, dizziness, or loss of consciousness.  According to HER-2 daughters, her 

symptoms got worse gradually.  In the emergency room, the patient was diagnosed 

was congestive heart failure exacerbation.  The chest x-ray showed findings 

consistent with CHF.  The BNP came in to be elevated as well.  Beside that she 

was also found to be in atrial fibrillation which is known to her.  The heart 

rate is under good control.  No history of coronary artery disease, congestive 

heart failure, or any cardiac arrhythmia in the past and the patient does not 

follow with any cardiologist before.  The patient was admitted to the hospital 

and she was started on Lasix IV.  An echocardiogram is in process to be done.I 

did have a long discussion with the patient's daughter in the room regarding 

anticoagulation.  I do feel that the patient is not a good candidate to have 

oral anticoagulation any visual of her age, frail status, as well as risk of 

falling and bleeding with her unsteady gait.





Past Medical History


Past Medical History: Diabetes Mellitus, Hyperlipidemia, Hypertension


Additional Past Medical History / Comment(s): cardiomegaly


History of Any Multi-Drug Resistant Organisms: None Reported


Additional Past Surgical History / Comment(s): eye surgery


Past Psychological History: No Psychological Hx Reported


Smoking Status: Light tobacco smoker


Past Alcohol Use History: None Reported


Past Drug Use History: None Reported





Medications and Allergies


                                Home Medications











 Medication  Instructions  Recorded  Confirmed  Type


 


ALPRAZolam [Xanax] 0.5 mg PO HS PRN 09/04/18 08/12/19 History


 


Atorvastatin [Lipitor] 10 mg PO DAILY 09/04/18 08/12/19 History


 


Ferrous Sulfate [Feosol] 325 mg PO DAILY 09/04/18 08/12/19 History


 


Valsartan [Diovan] 80 mg PO W/SUPPER 09/04/18 08/12/19 History


 


metFORMIN HCL [Glucophage] 500 mg PO BID-W/MEALS 09/04/18 08/12/19 History


 


Bimatoprost [Lumigan .01% Ophth 1 drop BOTH EYES HS 08/12/19 08/12/19 History





Soln]    


 


Latanoprost Ophth [Xalatan 0.005%] 1 drop BOTH EYES HS 08/12/19 08/12/19 History


 


Multivitamins, Thera [Multivitamin 1 tab PO DAILY 08/12/19 08/12/19 History





(formulary)]    








                                    Allergies











Allergy/AdvReac Type Severity Reaction Status Date / Time


 


No Known Allergies Allergy   Verified 08/12/19 11:49














Physical Exam


Vitals: 


                                   Vital Signs











  Temp Pulse Pulse Resp BP BP Pulse Ox


 


 08/13/19 04:00  97.5 F L   72  16   115/88  96


 


 08/13/19 03:11     16   


 


 08/13/19 00:00  98.2 F   72  16   118/72  97


 


 08/12/19 20:00  98.3 F   69  16   111/79  96


 


 08/12/19 16:46  97 F L   67  18   127/75  98


 


 08/12/19 15:52  97.7 F  60   17  98/66   98


 


 08/12/19 13:27   65   17  123/70   97


 


 08/12/19 11:01  97.5 F L  70   18  112/75   99








                                Intake and Output











 08/12/19 08/13/19 08/13/19





 22:59 06:59 14:59


 


Intake Total  120 240


 


Output Total 401 750 


 


Balance -401 -630 240


 


Intake:   


 


  Oral  120 240


 


Output:   


 


  Urine 401 750 


 


Other:   


 


  # Voids  2 


 


  Weight 48.1 kg 47.7 kg 














- Constitutional


General appearance: no acute distress





- Respiratory


Respiratory: bilateral: rales





- Cardiovascular


Rhythm: irregularly irregular


Heart sounds: normal: S1, S2


Abnormal Heart Sounds: systolic murmur





Results





                                 08/13/19 05:22





                                 08/13/19 05:22


                                 Cardiac Enzymes











  08/12/19 08/12/19 08/13/19 Range/Units





  11:24 11:24 05:22 


 


AST  30   28  (14-36)  U/L


 


Troponin I   0.022   (0.000-0.034)  ng/mL








                                   Coagulation











  08/12/19 Range/Units





  11:24 


 


PT  12.6 H  (9.0-12.0)  sec


 


APTT  27.3  (22.0-30.0)  sec








                                       CBC











  08/12/19 08/13/19 Range/Units





  11:24 05:22 


 


WBC  4.5  5.2  (3.8-10.6)  k/uL


 


RBC  5.68 H  5.37  (3.80-5.40)  m/uL


 


Hgb  12.3  11.5  (11.4-16.0)  gm/dL


 


Hct  43.3  41.4  (34.0-46.0)  %


 


Plt Count  157  159  (150-450)  k/uL








                          Comprehensive Metabolic Panel











  08/12/19 08/13/19 Range/Units





  11:24 05:22 


 


Sodium  141  139  (137-145)  mmol/L


 


Potassium  4.4  4.3  (3.5-5.1)  mmol/L


 


Chloride  106  105  ()  mmol/L


 


Carbon Dioxide  22  21 L  (22-30)  mmol/L


 


BUN  18 H  21 H  (7-17)  mg/dL


 


Creatinine  1.29 H  1.20 H  (0.52-1.04)  mg/dL


 


Glucose  166 H  105 H  (74-99)  mg/dL


 


Calcium  9.3  9.1  (8.4-10.2)  mg/dL


 


AST  30  28  (14-36)  U/L


 


ALT  6 L  14  (9-52)  U/L


 


Alkaline Phosphatase  44  42  ()  U/L


 


Total Protein  7.6  6.8  (6.3-8.2)  g/dL


 


Albumin  4.1  3.7  (3.5-5.0)  g/dL








                               Current Medications











Generic Name Dose Route Start Last Admin





  Trade Name Freq  PRN Reason Stop Dose Admin


 


Alprazolam  0.5 mg  08/12/19 16:45  08/12/19 22:42





  Xanax  PO   0.5 mg





  HS PRN   Administration





  Anxiety  


 


Aspirin  325 mg  08/13/19 12:00 





  Aspirin  PO  





  DAILY CELIA  


 


Atorvastatin Calcium  10 mg  08/13/19 09:00  08/13/19 09:16





  Lipitor  PO   10 mg





  DAILY CELIA   Administration


 


Ferrous Sulfate  325 mg  08/13/19 09:00  08/13/19 09:16





  Feosol  PO   325 mg





  DAILY CELIA   Administration


 


Furosemide  40 mg  08/12/19 14:15  08/13/19 09:17





  Lasix  IV   40 mg





  Q12HR CELIA   Administration


 


Heparin Sodium (Porcine)  5,000 unit  08/12/19 14:15  08/13/19 09:17





  Heparin  SQ   5,000 unit





  Q12HR CELIA   Administration


 


Latanoprost  1 drops  08/12/19 21:00  08/12/19 20:08





  Xalatan 0.005%  BOTH EYES   1 drops





  HS CELIA   Administration


 


Metformin HCl  500 mg  08/13/19 07:30  08/13/19 06:30





  Glucophage  PO   500 mg





  BID-W/MEALS CELIA   Administration


 


Multivitamins  1 each  08/13/19 09:00  08/13/19 09:16





  Theragran  PO   1 each





  DAILY CELIA   Administration


 


Nitroglycerin  0.5 inch  08/12/19 18:00  08/13/19 09:16





  Nitro-Bid Oint  TOPICAL   0.5 inch





  QID CELIA   Administration


 


Pantoprazole Sodium  40 mg  08/13/19 07:30  08/13/19 06:30





  Protonix  PO   40 mg





  AC-BRKFST CELIA   Administration


 


Sodium Chloride  10 ml  08/12/19 21:00  08/13/19 09:18





  Saline Flush  IV   10 ml





  BID CELIA   Administration


 


Valsartan  80 mg  08/12/19 17:45  08/12/19 20:07





  Diovan  PO   80 mg





  AC-SUPPER CELIA   Administration








                                Intake and Output











 08/12/19 08/13/19 08/13/19





 22:59 06:59 14:59


 


Intake Total  120 240


 


Output Total 401 750 


 


Balance -401 -630 240


 


Intake:   


 


  Oral  120 240


 


Output:   


 


  Urine 401 750 


 


Other:   


 


  # Voids  2 


 


  Weight 48.1 kg 47.7 kg 








                                        





                                 08/13/19 05:22 





                                 08/13/19 05:22 











Assessment and Plan


Assessment: 





Assessment


#1 congestive heart failure exacerbation of unknown etiology at this point


#2 atrial fibrillation was controlled heart rate


#3 diabetes mellitus


#4 hypertension


#5 dyslipidemia





Plan


#1 continue the current dose of Lasix IV.


#2 monitor the kidney function and electrolytes


#3 follow-up on the echocardiogram


#4 I would not recommend starting the patient on oral anticoagulation.  She is a

frail 85-year-old with visual problem and unsteady gait.  The risk of falling 

and bleeding is significant


#5 follow-up with the patient





Thank you for allowing us participate in the care of the patient

## 2019-08-14 LAB
ALBUMIN SERPL-MCNC: 3.6 G/DL (ref 3.5–5)
ALP SERPL-CCNC: 39 U/L (ref 38–126)
ALT SERPL-CCNC: 14 U/L (ref 9–52)
ANION GAP SERPL CALC-SCNC: 12 MMOL/L
AST SERPL-CCNC: 24 U/L (ref 14–36)
BASOPHILS # BLD AUTO: 0.1 K/UL (ref 0–0.2)
BASOPHILS NFR BLD AUTO: 1 %
BUN SERPL-SCNC: 23 MG/DL (ref 7–17)
CALCIUM SPEC-MCNC: 8.8 MG/DL (ref 8.4–10.2)
CHLORIDE SERPL-SCNC: 103 MMOL/L (ref 98–107)
CO2 SERPL-SCNC: 25 MMOL/L (ref 22–30)
EOSINOPHIL # BLD AUTO: 0.3 K/UL (ref 0–0.7)
EOSINOPHIL NFR BLD AUTO: 5 %
ERYTHROCYTE [DISTWIDTH] IN BLOOD BY AUTOMATED COUNT: 5.54 M/UL (ref 3.8–5.4)
ERYTHROCYTE [DISTWIDTH] IN BLOOD: 16.8 % (ref 11.5–15.5)
GLUCOSE BLD-MCNC: 103 MG/DL (ref 75–99)
GLUCOSE BLD-MCNC: 120 MG/DL (ref 75–99)
GLUCOSE BLD-MCNC: 140 MG/DL (ref 75–99)
GLUCOSE BLD-MCNC: 141 MG/DL (ref 75–99)
GLUCOSE SERPL-MCNC: 110 MG/DL (ref 74–99)
HCT VFR BLD AUTO: 42.3 % (ref 34–46)
HGB BLD-MCNC: 12 GM/DL (ref 11.4–16)
LYMPHOCYTES # SPEC AUTO: 1.6 K/UL (ref 1–4.8)
LYMPHOCYTES NFR SPEC AUTO: 32 %
MCH RBC QN AUTO: 21.6 PG (ref 25–35)
MCHC RBC AUTO-ENTMCNC: 28.4 G/DL (ref 31–37)
MCV RBC AUTO: 76.4 FL (ref 80–100)
MONOCYTES # BLD AUTO: 0.3 K/UL (ref 0–1)
MONOCYTES NFR BLD AUTO: 7 %
NEUTROPHILS # BLD AUTO: 2.6 K/UL (ref 1.3–7.7)
NEUTROPHILS NFR BLD AUTO: 53 %
PLATELET # BLD AUTO: 128 K/UL (ref 150–450)
POTASSIUM SERPL-SCNC: 3.7 MMOL/L (ref 3.5–5.1)
PROT SERPL-MCNC: 6.8 G/DL (ref 6.3–8.2)
SODIUM SERPL-SCNC: 140 MMOL/L (ref 137–145)
WBC # BLD AUTO: 4.9 K/UL (ref 3.8–10.6)

## 2019-08-14 RX ADMIN — NITROGLYCERIN SCH INCH: 20 OINTMENT TOPICAL at 08:30

## 2019-08-14 RX ADMIN — FUROSEMIDE SCH MG: 10 INJECTION, SOLUTION INTRAMUSCULAR; INTRAVENOUS at 20:40

## 2019-08-14 RX ADMIN — THERA TABS SCH EACH: TAB at 08:31

## 2019-08-14 RX ADMIN — PANTOPRAZOLE SODIUM SCH MG: 40 TABLET, DELAYED RELEASE ORAL at 06:58

## 2019-08-14 RX ADMIN — ASPIRIN 325 MG ORAL TABLET SCH MG: 325 PILL ORAL at 08:31

## 2019-08-14 RX ADMIN — VALSARTAN SCH MG: 80 TABLET ORAL at 17:00

## 2019-08-14 RX ADMIN — HEPARIN SODIUM SCH UNIT: 5000 INJECTION, SOLUTION INTRAVENOUS; SUBCUTANEOUS at 08:31

## 2019-08-14 RX ADMIN — NITROGLYCERIN SCH: 20 OINTMENT TOPICAL at 16:43

## 2019-08-14 RX ADMIN — SODIUM CHLORIDE, PRESERVATIVE FREE SCH ML: 5 INJECTION INTRAVENOUS at 20:41

## 2019-08-14 RX ADMIN — LATANOPROST SCH DROPS: 50 SOLUTION OPHTHALMIC at 20:40

## 2019-08-14 RX ADMIN — NITROGLYCERIN SCH: 20 OINTMENT TOPICAL at 17:02

## 2019-08-14 RX ADMIN — SODIUM CHLORIDE, PRESERVATIVE FREE SCH ML: 5 INJECTION INTRAVENOUS at 08:31

## 2019-08-14 RX ADMIN — Medication SCH MG: at 08:31

## 2019-08-14 RX ADMIN — NITROGLYCERIN SCH: 20 OINTMENT TOPICAL at 20:41

## 2019-08-14 RX ADMIN — FUROSEMIDE SCH MG: 10 INJECTION, SOLUTION INTRAMUSCULAR; INTRAVENOUS at 08:31

## 2019-08-14 RX ADMIN — ATORVASTATIN CALCIUM SCH MG: 10 TABLET, FILM COATED ORAL at 08:30

## 2019-08-14 RX ADMIN — HEPARIN SODIUM SCH UNIT: 5000 INJECTION, SOLUTION INTRAVENOUS; SUBCUTANEOUS at 20:40

## 2019-08-14 NOTE — P.PN
Subjective


Progress Note Date: 08/14/19








Patient is an 85-year-old female well known to my practice who presented to 

[facility] emergency room with a chief complaint of weakness 

and shortness of breath.  Patient speaks very little English, her 2 daughters 

are in the room and provided most of the history, they states that patient was 

visiting family members in [place] about 1 month ago at that time she started 

having some weakness and shortness of breath, and her symptoms have increased 

gradually over the last month.  She was seen at an urgent care clinic and was 

diagnosed with anemia and was given iron pills , her symptoms continued to 

worsen and family decided to bring her to emergency room .


Patient was evaluated by Dr. Sanchez in the emergency room , she had evidence of 

atrial fibrillation and evidence of pulmonary congestion with elevated BNP and 

chest x-ray findings suggestive of cardiomegaly with pulmonary venous congestion

, she was started on IV Lasix and was admitted to telemetry floor cardiology 

consultation was requested .


Patient had episodes of anemia on and off over the years , she was counseled to 

have a colonoscopy however she refused repeatedly , she was approached again 

about a colonoscopy and she refused today .


Hemoglobin at this time is normal at 12.3 





On 08/13/2019 patient is currently resting comfortably in bed.  Daughters are at

bedside and able to translate.  Patient reports that she is feeling slightly 

improved with her shortness of breath.  Patient denies chest pain.  Patient d

enies nausea vomiting or diarrhea.  Patient denies any urinary burning or 

frequency.  Patient remains on IV Lasix.  Cardiology services are following





On 08/14/2019 patient is currently resting comfortably in bed daughter at 

bedside to translate.  Patient is feeling improved today.  Patient remains on IV

Lasix.  Creatinine did increase to 1.33 likely will be transitioned to oral 

Lasix today per cardiology.  This time patient denies chest pain or shortness of

breath.  Patient denies nausea vomiting or diarrhea.  Patient denies any urinary

burning or frequency.





Objective





- Vital Signs


Vital signs: 


                                   Vital Signs











Temp  97.6 F   08/14/19 08:00


 


Pulse  88   08/14/19 08:00


 


Resp  16   08/14/19 08:00


 


BP  108/55   08/14/19 08:00


 


Pulse Ox  98   08/14/19 08:00








                                 Intake & Output











 08/13/19 08/14/19 08/14/19





 18:59 06:59 18:59


 


Intake Total 720 10 


 


Output Total 1400 400 


 


Balance -680 -390 


 


Weight 47.7 kg 45.5 kg 


 


Intake:   


 


  IV  10 


 


    0.9  10 


 


  Oral 720  


 


Output:   


 


  Urine 1400 400 


 


Other:   


 


  Voiding Method  Toilet 


 


  # Voids  1 














- Exam








In general patient is alert responsive in no apparent distress answer questions 

appropriately her daughter's help with translation


HEENT head normocephalic and atraumatic


Neck is supple no JVD no goiter no lymphadenopathy


Chest exam reveals a scattered crackles in both bases no wheezing


Cardiac exam reveals regular heart sounds S1 and S2 no gallops no murmurs


Abdomen is soft nontender no organomegaly with normal bowel sounds


Extremity exam reveals mild edema in both feet no cyanosis or clubbing


Neurological examination reveals no gross focal deficit





- Labs


CBC & Chem 7: 


                                 08/14/19 05:38





                                 08/14/19 05:38


Labs: 


                  Abnormal Lab Results - Last 24 Hours (Table)











  08/13/19 08/13/19 08/13/19 Range/Units





  11:26 16:36 21:13 


 


RBC     (3.80-5.40)  m/uL


 


MCV     (80.0-100.0)  fL


 


MCH     (25.0-35.0)  pg


 


MCHC     (31.0-37.0)  g/dL


 


RDW     (11.5-15.5)  %


 


Plt Count     (150-450)  k/uL


 


BUN     (7-17)  mg/dL


 


Creatinine     (0.52-1.04)  mg/dL


 


Glucose     (74-99)  mg/dL


 


POC Glucose (mg/dL)  103 H  103 H  137 H  (75-99)  mg/dL


 


Total Bilirubin     (0.2-1.3)  mg/dL














  08/14/19 08/14/19 08/14/19 Range/Units





  05:38 05:38 06:16 


 


RBC  5.54 H    (3.80-5.40)  m/uL


 


MCV  76.4 L    (80.0-100.0)  fL


 


MCH  21.6 L    (25.0-35.0)  pg


 


MCHC  28.4 L    (31.0-37.0)  g/dL


 


RDW  16.8 H    (11.5-15.5)  %


 


Plt Count  128 L    (150-450)  k/uL


 


BUN   23 H   (7-17)  mg/dL


 


Creatinine   1.33 H   (0.52-1.04)  mg/dL


 


Glucose   110 H   (74-99)  mg/dL


 


POC Glucose (mg/dL)    103 H  (75-99)  mg/dL


 


Total Bilirubin   2.0 H   (0.2-1.3)  mg/dL








                      Microbiology - Last 24 Hours (Table)











 08/13/19 12:46 Urine Culture - Preliminary





 Urine,Clean Catch 














Assessment and Plan


Assessment: 





#1 acute congestive heart failure exacerbation with shortness of breath will 

obtain echocardiogram was consult cardiology patient was started on IV Lasix in 

the emergency room.  2-D echo has been ordered





#2 atrial fibrillation heart rate is well-controlled at this time, will discuss 

was cardiology risk versus benefit of anticoagulation, patient and her family 

denies any falls, however she had on and off episodes of anemia and she had 

refused any gastroenterology evaluation.  Per cardiology would not recommend 

starting patient on oral anticoagulation per cardiology she's a fairly 

5-year-old with visual problems an unsteady gait the risk of falling and 

bleeding is significant. 





#3 underlying history of hypertension will monitor blood pressure and adjust 

medications as needed





#4 underlying history of hyperlipidemia maintained on Lipitor continue





#5 tobacco use patient was counseled regarding abstaining from the use of 

tobacco





#6 history of diabetes mellitus will check hemoglobin A1c to assess need for any

medication adjustment





#7 acute kidney injury.  Creatinine increasing to 1.33.  IV Lasix will likely be

transitioned by mouth Lasix today per cardiology will continue to monitor





#8 small amount of leukocyte Estrace in urine.  Patient is asymptomatic white 

blood cells within normal limits afebrile urine culture has been ordered.





DVT prophylaxis heparin.  GI prophylaxis Protonix








I performed an examination of the patient and discussed their management with anthony wilson Nurse Practitioner.  I have reviewed the Nurse Practitioner's notes and agree

with the documented findings and plan of care

## 2019-08-14 NOTE — P.PN
Subjective


Progress Note Date: 08/14/19


Principal diagnosis: 





Congestive heart failure exacerbation secondary to diastole dysfunction





This is a pleasant 85-year-old female patient who does not speak English was 

brought by her family to the emergency room for increasing shortness of breath 

as well as increasing weakness.  The patient does not speak English very well, 

and I was able to take history from her 2 daughters in the room.  The patient 

lives with her daughter at home.  She is blind in the left eye and she has very 

decreased vision in the right eye as well.  Otherwise she walks on her own but 

her gait is not very steady according to her daughters.  For the last few days, 

she has been experiencing progressive weakness as well as progressive fatigue 

and progressive shortness of breath.  Her daughters felt that the patient was 

very congested.  Because of that they brought her to the emergency room.  No i

ndication of any chest pain or chest discomfort, fever, or chills, cough, 

dizziness, or loss of consciousness.  According to HER-2 daughters, her symptoms

got worse gradually.  In the emergency room, the patient was diagnosed was 

congestive heart failure exacerbation.  The chest x-ray showed findings 

consistent with CHF.  The BNP came in to be elevated as well.  Beside that she 

was also found to be in atrial fibrillation which is known to her.  The heart 

rate is under good control.  No history of coronary artery disease, congestive 

heart failure, or any cardiac arrhythmia in the past and the patient does not 

follow with any cardiologist before.  The patient was admitted to the hospital 

and she was started on Lasix IV.  An echocardiogram is in process to be done.I 

did have a long discussion with the patient's daughter in the room regarding 

anticoagulation.  I do feel that the patient is not a good candidate to have 

oral anticoagulation any visual of her age, frail status, as well as risk of 

falling and bleeding with her unsteady gait.





On follow-up with the patient today, 08/14/2019, the patient is doing slightly 

better in terms of shortness of breath.  She still have bilateral lower 

extremities edema.  She continues to be on Lasix IV.  The creatinine continues 

to be stable.  The echocardiogram revealed normal LV function with evidence of 

mild aortic stenosis, mild mitral stenosis, severe tricuspid regurgitation, and 

severe pulmonary hypertension.  The pulmonary artery systolic pressure is almost

90 mmHg.  The prognosis overall is very poor.








Objective





- Vital Signs


Vital signs: 


                                   Vital Signs











Temp  97.6 F   08/14/19 08:00


 


Pulse  88   08/14/19 08:00


 


Resp  16   08/14/19 11:56


 


BP  108/55   08/14/19 08:00


 


Pulse Ox  98   08/14/19 08:00








                                 Intake & Output











 08/13/19 08/14/19 08/14/19





 18:59 06:59 18:59


 


Intake Total 720 10 480


 


Output Total 1400 400 800


 


Balance -680 -390 -320


 


Weight 47.7 kg 45.5 kg 


 


Intake:   


 


  IV  10 


 


    0.9  10 


 


  Oral 720  480


 


Output:   


 


  Urine 1400 400 800


 


Other:   


 


  Voiding Method  Toilet Toilet


 


  # Voids  1 














- Constitutional


General appearance: Present: no acute distress





- Respiratory


Respiratory: bilateral: rales





- Cardiovascular


Rhythm: irregularly irregular


Heart sounds: normal: S1, S2


Abnormal Heart Sounds: Present: systolic murmur





- Labs


CBC & Chem 7: 


                                 08/14/19 05:38





                                 08/14/19 05:38


Labs: 


                  Abnormal Lab Results - Last 24 Hours (Table)











  08/13/19 08/13/19 08/14/19 Range/Units





  16:36 21:13 05:38 


 


RBC    5.54 H  (3.80-5.40)  m/uL


 


MCV    76.4 L  (80.0-100.0)  fL


 


MCH    21.6 L  (25.0-35.0)  pg


 


MCHC    28.4 L  (31.0-37.0)  g/dL


 


RDW    16.8 H  (11.5-15.5)  %


 


Plt Count    128 L  (150-450)  k/uL


 


BUN     (7-17)  mg/dL


 


Creatinine     (0.52-1.04)  mg/dL


 


Glucose     (74-99)  mg/dL


 


POC Glucose (mg/dL)  103 H  137 H   (75-99)  mg/dL


 


Total Bilirubin     (0.2-1.3)  mg/dL














  08/14/19 08/14/19 08/14/19 Range/Units





  05:38 06:16 11:43 


 


RBC     (3.80-5.40)  m/uL


 


MCV     (80.0-100.0)  fL


 


MCH     (25.0-35.0)  pg


 


MCHC     (31.0-37.0)  g/dL


 


RDW     (11.5-15.5)  %


 


Plt Count     (150-450)  k/uL


 


BUN  23 H    (7-17)  mg/dL


 


Creatinine  1.33 H    (0.52-1.04)  mg/dL


 


Glucose  110 H    (74-99)  mg/dL


 


POC Glucose (mg/dL)   103 H  140 H  (75-99)  mg/dL


 


Total Bilirubin  2.0 H    (0.2-1.3)  mg/dL








                      Microbiology - Last 24 Hours (Table)











 08/13/19 12:46 Urine Culture - Preliminary





 Urine,Clean Catch 














Assessment and Plan


Assessment: 





Assessment


#1 congestive heart failure exacerbation of unknown etiology at this point


#2 atrial fibrillation was controlled heart rate


#3 diabetes mellitus


#4 hypertension


#5 dyslipidemia





Plan


#1 continue the current dose of Lasix IV.


#2 monitor the kidney function and electrolytes


#3 the echo revealed normal LV function with severe pulmonary hypertension


#4 I would not recommend starting the patient on oral anticoagulation.  She is a

frail 85-year-old with visual problem and unsteady gait.  The risk of falling 

and bleeding is significant


#5 follow-up with the patient





Thank you for allowing us participate in the care of the patient

## 2019-08-15 VITALS — RESPIRATION RATE: 16 BRPM | TEMPERATURE: 97.5 F

## 2019-08-15 VITALS — SYSTOLIC BLOOD PRESSURE: 90 MMHG | DIASTOLIC BLOOD PRESSURE: 53 MMHG | HEART RATE: 81 BPM

## 2019-08-15 LAB
ALBUMIN SERPL-MCNC: 3.7 G/DL (ref 3.5–5)
ALP SERPL-CCNC: 38 U/L (ref 38–126)
ALT SERPL-CCNC: 12 U/L (ref 9–52)
ANION GAP SERPL CALC-SCNC: 12 MMOL/L
AST SERPL-CCNC: 28 U/L (ref 14–36)
BASOPHILS # BLD AUTO: 0 K/UL (ref 0–0.2)
BASOPHILS NFR BLD AUTO: 1 %
BUN SERPL-SCNC: 23 MG/DL (ref 7–17)
CALCIUM SPEC-MCNC: 8.9 MG/DL (ref 8.4–10.2)
CHLORIDE SERPL-SCNC: 98 MMOL/L (ref 98–107)
CO2 SERPL-SCNC: 31 MMOL/L (ref 22–30)
EOSINOPHIL # BLD AUTO: 0.2 K/UL (ref 0–0.7)
EOSINOPHIL NFR BLD AUTO: 5 %
ERYTHROCYTE [DISTWIDTH] IN BLOOD BY AUTOMATED COUNT: 5.41 M/UL (ref 3.8–5.4)
ERYTHROCYTE [DISTWIDTH] IN BLOOD: 16.7 % (ref 11.5–15.5)
GLUCOSE BLD-MCNC: 112 MG/DL (ref 75–99)
GLUCOSE BLD-MCNC: 96 MG/DL (ref 75–99)
GLUCOSE SERPL-MCNC: 106 MG/DL (ref 74–99)
HBA1C MFR BLD: 6.1 % (ref 4–6)
HCT VFR BLD AUTO: 40.8 % (ref 34–46)
HGB BLD-MCNC: 12 GM/DL (ref 11.4–16)
LYMPHOCYTES # SPEC AUTO: 1.6 K/UL (ref 1–4.8)
LYMPHOCYTES NFR SPEC AUTO: 35 %
MCH RBC QN AUTO: 22.1 PG (ref 25–35)
MCHC RBC AUTO-ENTMCNC: 29.3 G/DL (ref 31–37)
MCV RBC AUTO: 75.4 FL (ref 80–100)
MONOCYTES # BLD AUTO: 0.4 K/UL (ref 0–1)
MONOCYTES NFR BLD AUTO: 8 %
NEUTROPHILS # BLD AUTO: 2.3 K/UL (ref 1.3–7.7)
NEUTROPHILS NFR BLD AUTO: 50 %
PLATELET # BLD AUTO: 167 K/UL (ref 150–450)
POTASSIUM SERPL-SCNC: 3.3 MMOL/L (ref 3.5–5.1)
PROT SERPL-MCNC: 6.9 G/DL (ref 6.3–8.2)
SODIUM SERPL-SCNC: 141 MMOL/L (ref 137–145)
WBC # BLD AUTO: 4.7 K/UL (ref 3.8–10.6)

## 2019-08-15 RX ADMIN — SODIUM CHLORIDE, PRESERVATIVE FREE SCH: 5 INJECTION INTRAVENOUS at 12:32

## 2019-08-15 RX ADMIN — PANTOPRAZOLE SODIUM SCH MG: 40 TABLET, DELAYED RELEASE ORAL at 07:06

## 2019-08-15 RX ADMIN — NITROGLYCERIN SCH INCH: 20 OINTMENT TOPICAL at 08:55

## 2019-08-15 RX ADMIN — Medication SCH MG: at 08:55

## 2019-08-15 RX ADMIN — POTASSIUM CHLORIDE SCH MEQ: 20 TABLET, EXTENDED RELEASE ORAL at 12:31

## 2019-08-15 RX ADMIN — HEPARIN SODIUM SCH UNIT: 5000 INJECTION, SOLUTION INTRAVENOUS; SUBCUTANEOUS at 08:54

## 2019-08-15 RX ADMIN — THERA TABS SCH EACH: TAB at 08:55

## 2019-08-15 RX ADMIN — FUROSEMIDE SCH MG: 10 INJECTION, SOLUTION INTRAMUSCULAR; INTRAVENOUS at 08:54

## 2019-08-15 RX ADMIN — NITROGLYCERIN SCH: 20 OINTMENT TOPICAL at 12:31

## 2019-08-15 RX ADMIN — POTASSIUM CHLORIDE SCH MEQ: 20 TABLET, EXTENDED RELEASE ORAL at 08:55

## 2019-08-15 RX ADMIN — ASPIRIN 325 MG ORAL TABLET SCH MG: 325 PILL ORAL at 08:55

## 2019-08-15 RX ADMIN — ATORVASTATIN CALCIUM SCH MG: 10 TABLET, FILM COATED ORAL at 08:55

## 2019-08-15 NOTE — P.DS
Providers


Date of admission: 


08/12/19 13:51





Expected date of discharge: 08/15/19


Attending physician: 


Kamlesh Ramos





Consults: 





                                        





08/12/19 13:51


Consult Physician Routine 


   Consulting Provider: Cesar Polo


   Consult Reason/Comments: CHF, A. fib


   Do you want consulting provider notified?: Yes











Primary care physician: 


Kamlesh Rachel





[facility] Course: 





Discharge diagnosis


#1 acute congestive heart failure exacerbation with shortness of breath will 

obtain echocardiogram was consult cardiology patient was started on IV Lasix in 

the emergency room.  2-D echo completed showing EF between 50 and 55%.  Per 

cardiology patient has been started on Lasix 40 mg daily.  Dose of valsartan 

decreased.  Patient has been cleared for discharge from cardiology standpoint





#2 atrial fibrillation heart rate is well-controlled at this time, will discuss 

was cardiology risk versus benefit of anticoagulation, patient and her family 

denies any falls, however she had on and off episodes of anemia and she had 

refused any gastroenterology evaluation.  Per cardiology would not recommend 

starting patient on oral anticoagulation per cardiology she's a frail  

85-year-old with visual problems an unsteady gait the risk of falling and 

bleeding is significant.  Per cardiology patient to be maintained on aspirin 325

daily





#3 underlying history of hypertension will monitor blood pressure and adjust 

medications as needed





#4 underlying history of hyperlipidemia maintained on Lipitor continue





#5 tobacco use patient was counseled regarding abstaining from the use of 

tobacco





#6 history of diabetes mellitus will check hemoglobin A1c to assess need for any

medication adjustment





#7 acute kidney injury.  Creatinine increasing to 1.33.  IV Lasix will likely be

transitioned by mouth Lasix today per cardiology will continue to monitor.  

Repeat CMP ordered for 2 days





#8 small amount of leukocyte Estrace in urine.  Patient is asymptomatic white 

blood cells within normal limits afebrile urine culture has been ordered.  Urine

culture showing no growth











Hospital course








Patient is an 85-year-old female well known to my practice who presented to 

[facility] emergency room with a chief complaint of weakness 

and shortness of breath.  Patient speaks very little English, her 2 daughters 

are in the room and provided most of the history, they states that patient was 

visiting family members in [place] about 1 month ago at that time she started 

having some weakness and shortness of breath, and her symptoms have increased 

gradually over the last month.  She was seen at an urgent care clinic and was 

diagnosed with anemia and was given iron pills , her symptoms continued to 

worsen and family decided to bring her to emergency room .


Patient was evaluated by Dr. Sanchez in the emergency room , she had evidence of 

atrial fibrillation and evidence of pulmonary congestion with elevated BNP and 

chest x-ray findings suggestive of cardiomegaly with pulmonary venous congestion

, she was started on IV Lasix and was admitted to telemetry floor cardiology 

consultation was requested .


Patient had episodes of anemia on and off over the years , she was counseled to 

have a colonoscopy however she refused repeatedly , she was approached again 

about a colonoscopy and she refused today .


Hemoglobin at this time is normal at 12.3 





On 08/13/2019 patient is currently resting comfortably in bed.  Daughters are at

bedside and able to translate.  Patient reports that she is feeling slightly 

improved with her shortness of breath.  Patient denies chest pain.  Patient 

denies nausea vomiting or diarrhea.  Patient denies any urinary burning or 

frequency.  Patient remains on IV Lasix.  Cardiology services are following





On 08/14/2019 patient is currently resting comfortably in bed daughter at 

bedside to translate.  Patient is feeling improved today.  Patient remains on IV

Lasix.  Creatinine did increase to 1.33 likely will be transitioned to oral 

Lasix today per cardiology.  This time patient denies chest pain or shortness of

breath.  Patient denies nausea vomiting or diarrhea.  Patient denies any urinary

burning or frequency.





On 08/15/2019 patient's alert and oriented 3.  Daughter at bedside to 

translate.  Patient reports she is feeling much improved.  Patient has been 

transitioned to oral Lasix 40 mg daily.  Creatinine did improve.  Patient has 

been cleared for discharge from cardiology standpoint.  Patient will be DC'd on 

Lasix 40 daily, decrease dose of valsartan and aspirin 325 daily.  At this time 

patient denies chest pain or shortness of breath.  Patient denies nausea 

vomiting or diarrhea.  Patient denies any urinary burning or frequency








I performed an examination of the patient and discussed their management with 

the Nurse Practitioner.  I have reviewed the Nurse Practitioner's notes and 

agree with the documented findings and plan of care


Patient Condition at Discharge: Stable





Plan - Discharge Summary


Discharge Rx Participant: Yes


New Discharge Prescriptions: 


New


   Aspirin 325 mg PO DAILY 30 Days #30 tab


   Valsartan [Diovan] 40 mg PO AC-SUPPER 30 Days #30 tab


   Furosemide [Lasix] 40 mg PO DAILY 30 Days #30 tab





Continue


   ALPRAZolam [Xanax] 0.5 mg PO HS PRN


     PRN Reason: Anxiety


   metFORMIN HCL [Glucophage] 500 mg PO BID-W/MEALS


   Ferrous Sulfate [Iron (65 MG Elemental)] 325 mg PO DAILY


   Atorvastatin [Lipitor] 10 mg PO DAILY


   Multivitamins, Thera [Multivitamin (formulary)] 1 tab PO DAILY


   Latanoprost Ophth [Xalatan 0.005%] 1 drop BOTH EYES HS


   Bimatoprost [Lumigan .01% Ophth Soln] 1 drop BOTH EYES HS





Discontinued


   Valsartan [Diovan] 80 mg PO W/SUPPER


Discharge Medication List





ALPRAZolam [Xanax] 0.5 mg PO HS PRN 09/04/18 [History]


Atorvastatin [Lipitor] 10 mg PO DAILY 09/04/18 [History]


Ferrous Sulfate [Iron (65 MG Elemental)] 325 mg PO DAILY 09/04/18 [History]


metFORMIN HCL [Glucophage] 500 mg PO BID-W/MEALS 09/04/18 [History]


Bimatoprost [Lumigan .01% Ophth Soln] 1 drop BOTH EYES HS 08/12/19 [History]


Latanoprost Ophth [Xalatan 0.005%] 1 drop BOTH EYES HS 08/12/19 [History]


Multivitamins, Thera [Multivitamin (formulary)] 1 tab PO DAILY 08/12/19 

[History]


Aspirin 325 mg PO DAILY 30 Days #30 tab 08/15/19 [Rx]


Furosemide [Lasix] 40 mg PO DAILY 30 Days #30 tab 08/15/19 [Rx]


Valsartan [Diovan] 40 mg PO AC-SUPPER 30 Days #30 tab 08/15/19 [Rx]








Follow up Appointment(s)/Referral(s): 


Cesar Polo MD [STAFF PHYSICIAN] - 1 Week


[facility], [NON-STAFF] - 


Kamlesh Ramos MD [Primary Care Provider] - 1-2 days


Ambulatory/Diagnostic Orders: 


Comprehensive Metabolic Panel [LAB.AMB] Time Frame: 2 Days, Location: None 

Selected


Patient Instructions/Handouts:  Heart Failure (DC), A-fib (Atrial Fibrillation) 

(DC), How to Stop Smoking (DC), Heart Healthy Diet (DC)

## 2019-08-15 NOTE — P.PN
Subjective


Progress Note Date: 08/15/19


Principal diagnosis: 





Congestive heart failure exacerbation secondary to diastole dysfunction





This is a pleasant 85-year-old female patient who does not speak English was 

brought by her family to the emergency room for increasing shortness of breath 

as well as increasing weakness.  The patient does not speak English very well, 

and I was able to take history from her 2 daughters in the room.  The patient 

lives with her daughter at home.  She is blind in the left eye and she has very 

decreased vision in the right eye as well.  Otherwise she walks on her own but 

her gait is not very steady according to her daughters.  For the last few days, 

she has been experiencing progressive weakness as well as progressive fatigue 

and progressive shortness of breath.  Her daughters felt that the patient was 

very congested.  Because of that they brought her to the emergency room.  No i

ndication of any chest pain or chest discomfort, fever, or chills, cough, 

dizziness, or loss of consciousness.  According to HER-2 daughters, her symptoms

got worse gradually.  In the emergency room, the patient was diagnosed was 

congestive heart failure exacerbation.  The chest x-ray showed findings 

consistent with CHF.  The BNP came in to be elevated as well.  Beside that she 

was also found to be in atrial fibrillation which is known to her.  The heart 

rate is under good control.  No history of coronary artery disease, congestive 

heart failure, or any cardiac arrhythmia in the past and the patient does not 

follow with any cardiologist before.  The patient was admitted to the hospital 

and she was started on Lasix IV.  An echocardiogram is in process to be done.I 

did have a long discussion with the patient's daughter in the room regarding 

anticoagulation.  I do feel that the patient is not a good candidate to have 

oral anticoagulation any visual of her age, frail status, as well as risk of 

falling and bleeding with her unsteady gait.





On follow-up with the patient today, 08/16/2019, the patient is feeling better 

internal shortness of breath.  She denies any symptoms of chest pain or chest 

discomfort.  The lower extremities edema has improved.  The blood pressure has 

been marginally low.  I'm going to decrease the dose of valsartan, DC Lasix IV, 

start the patient on Lasix by mouth.  From a perivascular standpoint of view, 

the patient can be discharged home.














Objective





- Vital Signs


Vital signs: 


                                   Vital Signs











Temp  97.5 F L  08/15/19 08:00


 


Pulse  90   08/15/19 08:00


 


Resp  16   08/15/19 08:00


 


BP  101/62   08/15/19 08:00


 


Pulse Ox  95   08/15/19 08:00








                                 Intake & Output











 08/14/19 08/15/19 08/15/19





 18:59 06:59 18:59


 


Intake Total 720 510 


 


Output Total 800 800 


 


Balance -80 -290 


 


Weight  43.8 kg 


 


Intake:   


 


  IV  30 


 


    Invasive Line 1  30 


 


  Oral 720 480 


 


Output:   


 


  Urine 800 800 


 


Other:   


 


  Voiding Method Toilet Toilet 


 


  # Voids  2 














- Constitutional


General appearance: Present: no acute distress





- Respiratory


Respiratory: bilateral: diminished





- Cardiovascular


Rhythm: irregularly irregular


Heart sounds: normal: S1, S2


Abnormal Heart Sounds: Present: systolic murmur





- Labs


CBC & Chem 7: 


                                 08/15/19 06:33





                                 08/15/19 06:33


Labs: 


                  Abnormal Lab Results - Last 24 Hours (Table)











  08/14/19 08/14/19 08/14/19 Range/Units





  11:43 16:38 20:22 


 


RBC     (3.80-5.40)  m/uL


 


MCV     (80.0-100.0)  fL


 


MCH     (25.0-35.0)  pg


 


MCHC     (31.0-37.0)  g/dL


 


RDW     (11.5-15.5)  %


 


Potassium     (3.5-5.1)  mmol/L


 


Carbon Dioxide     (22-30)  mmol/L


 


BUN     (7-17)  mg/dL


 


Creatinine     (0.52-1.04)  mg/dL


 


Glucose     (74-99)  mg/dL


 


POC Glucose (mg/dL)  140 H  141 H  120 H  (75-99)  mg/dL


 


Total Bilirubin     (0.2-1.3)  mg/dL














  08/15/19 08/15/19 Range/Units





  06:33 06:33 


 


RBC  5.41 H   (3.80-5.40)  m/uL


 


MCV  75.4 L   (80.0-100.0)  fL


 


MCH  22.1 L   (25.0-35.0)  pg


 


MCHC  29.3 L   (31.0-37.0)  g/dL


 


RDW  16.7 H   (11.5-15.5)  %


 


Potassium   3.3 L  (3.5-5.1)  mmol/L


 


Carbon Dioxide   31 H  (22-30)  mmol/L


 


BUN   23 H  (7-17)  mg/dL


 


Creatinine   1.27 H  (0.52-1.04)  mg/dL


 


Glucose   106 H  (74-99)  mg/dL


 


POC Glucose (mg/dL)    (75-99)  mg/dL


 


Total Bilirubin   2.5 H  (0.2-1.3)  mg/dL








                      Microbiology - Last 24 Hours (Table)











 08/13/19 12:46 Urine Culture - Final





 Urine,Clean Catch 














Assessment and Plan


Assessment: 





Assessment


#1 congestive heart failure exacerbation of unknown etiology at this point


#2 atrial fibrillation was controlled heart rate


#3 diabetes mellitus


#4 marginal blood pressure


#5 dyslipidemia





Plan


#1 DC Lasix IV and start the patient on Lasix by mouth


#2 decrease the dose of valsartan 


#3 the patient can be discharged home

## 2019-12-06 ENCOUNTER — HOSPITAL ENCOUNTER (INPATIENT)
Dept: HOSPITAL 47 - EC | Age: 84
LOS: 6 days | Discharge: HOSPICE | DRG: 871 | End: 2019-12-12
Attending: INTERNAL MEDICINE | Admitting: INTERNAL MEDICINE
Payer: MEDICARE

## 2019-12-06 VITALS — BODY MASS INDEX: 21 KG/M2

## 2019-12-06 DIAGNOSIS — N18.3: ICD-10-CM

## 2019-12-06 DIAGNOSIS — T50.8X5A: ICD-10-CM

## 2019-12-06 DIAGNOSIS — R19.7: ICD-10-CM

## 2019-12-06 DIAGNOSIS — I95.9: ICD-10-CM

## 2019-12-06 DIAGNOSIS — R29.6: ICD-10-CM

## 2019-12-06 DIAGNOSIS — I48.19: ICD-10-CM

## 2019-12-06 DIAGNOSIS — E11.9: ICD-10-CM

## 2019-12-06 DIAGNOSIS — R45.1: ICD-10-CM

## 2019-12-06 DIAGNOSIS — I13.0: ICD-10-CM

## 2019-12-06 DIAGNOSIS — E87.1: ICD-10-CM

## 2019-12-06 DIAGNOSIS — G93.41: ICD-10-CM

## 2019-12-06 DIAGNOSIS — J18.9: ICD-10-CM

## 2019-12-06 DIAGNOSIS — I50.33: ICD-10-CM

## 2019-12-06 DIAGNOSIS — Z79.84: ICD-10-CM

## 2019-12-06 DIAGNOSIS — T46.5X5A: ICD-10-CM

## 2019-12-06 DIAGNOSIS — E86.0: ICD-10-CM

## 2019-12-06 DIAGNOSIS — K82.8: ICD-10-CM

## 2019-12-06 DIAGNOSIS — R11.10: ICD-10-CM

## 2019-12-06 DIAGNOSIS — F41.9: ICD-10-CM

## 2019-12-06 DIAGNOSIS — T50.1X5A: ICD-10-CM

## 2019-12-06 DIAGNOSIS — E87.2: ICD-10-CM

## 2019-12-06 DIAGNOSIS — I08.3: ICD-10-CM

## 2019-12-06 DIAGNOSIS — F17.220: ICD-10-CM

## 2019-12-06 DIAGNOSIS — I45.10: ICD-10-CM

## 2019-12-06 DIAGNOSIS — N17.0: ICD-10-CM

## 2019-12-06 DIAGNOSIS — Z79.82: ICD-10-CM

## 2019-12-06 DIAGNOSIS — A41.9: Primary | ICD-10-CM

## 2019-12-06 DIAGNOSIS — R19.00: ICD-10-CM

## 2019-12-06 DIAGNOSIS — Z51.5: ICD-10-CM

## 2019-12-06 DIAGNOSIS — I27.20: ICD-10-CM

## 2019-12-06 DIAGNOSIS — Z79.899: ICD-10-CM

## 2019-12-06 DIAGNOSIS — R74.8: ICD-10-CM

## 2019-12-06 DIAGNOSIS — Z66: ICD-10-CM

## 2019-12-06 DIAGNOSIS — J96.01: ICD-10-CM

## 2019-12-06 DIAGNOSIS — E78.5: ICD-10-CM

## 2019-12-06 LAB
ALBUMIN SERPL-MCNC: 4 G/DL (ref 3.5–5)
ALP SERPL-CCNC: 75 U/L (ref 38–126)
ALT SERPL-CCNC: 14 U/L (ref 9–52)
ANION GAP SERPL CALC-SCNC: 11 MMOL/L
APTT BLD: 24.1 SEC (ref 22–30)
AST SERPL-CCNC: 34 U/L (ref 14–36)
BASOPHILS # BLD AUTO: 0 K/UL (ref 0–0.2)
BASOPHILS NFR BLD AUTO: 0 %
BUN SERPL-SCNC: 20 MG/DL (ref 7–17)
CALCIUM SPEC-MCNC: 8.6 MG/DL (ref 8.4–10.2)
CHLORIDE SERPL-SCNC: 102 MMOL/L (ref 98–107)
CO2 SERPL-SCNC: 24 MMOL/L (ref 22–30)
D DIMER PPP FEU-MCNC: 4.74 MG/L FEU (ref ?–0.6)
EOSINOPHIL # BLD AUTO: 0.2 K/UL (ref 0–0.7)
EOSINOPHIL NFR BLD AUTO: 2 %
ERYTHROCYTE [DISTWIDTH] IN BLOOD BY AUTOMATED COUNT: 4.79 M/UL (ref 3.8–5.4)
ERYTHROCYTE [DISTWIDTH] IN BLOOD: 15.6 % (ref 11.5–15.5)
GLUCOSE BLD-MCNC: 116 MG/DL (ref 75–99)
GLUCOSE BLD-MCNC: 123 MG/DL (ref 75–99)
GLUCOSE BLD-MCNC: 131 MG/DL (ref 75–99)
GLUCOSE BLD-MCNC: 141 MG/DL (ref 75–99)
GLUCOSE BLD-MCNC: 145 MG/DL (ref 75–99)
GLUCOSE SERPL-MCNC: 276 MG/DL (ref 74–99)
HCT VFR BLD AUTO: 37.5 % (ref 34–46)
HGB BLD-MCNC: 10.9 GM/DL (ref 11.4–16)
INR PPP: 1.1 (ref ?–1.2)
LYMPHOCYTES # SPEC AUTO: 0.8 K/UL (ref 1–4.8)
LYMPHOCYTES NFR SPEC AUTO: 8 %
MAGNESIUM SPEC-SCNC: 1.6 MG/DL (ref 1.6–2.3)
MCH RBC QN AUTO: 22.8 PG (ref 25–35)
MCHC RBC AUTO-ENTMCNC: 29.1 G/DL (ref 31–37)
MCV RBC AUTO: 78.3 FL (ref 80–100)
MONOCYTES # BLD AUTO: 0.1 K/UL (ref 0–1)
MONOCYTES NFR BLD AUTO: 1 %
NEUTROPHILS # BLD AUTO: 9.3 K/UL (ref 1.3–7.7)
NEUTROPHILS NFR BLD AUTO: 89 %
PLATELET # BLD AUTO: 114 K/UL (ref 150–450)
POTASSIUM SERPL-SCNC: 4.1 MMOL/L (ref 3.5–5.1)
PROT SERPL-MCNC: 7.7 G/DL (ref 6.3–8.2)
PT BLD: 11.7 SEC (ref 9–12)
SODIUM SERPL-SCNC: 137 MMOL/L (ref 137–145)
WBC # BLD AUTO: 10.4 K/UL (ref 3.8–10.6)

## 2019-12-06 PROCEDURE — 83880 ASSAY OF NATRIURETIC PEPTIDE: CPT

## 2019-12-06 PROCEDURE — 94640 AIRWAY INHALATION TREATMENT: CPT

## 2019-12-06 PROCEDURE — 71045 X-RAY EXAM CHEST 1 VIEW: CPT

## 2019-12-06 PROCEDURE — 76705 ECHO EXAM OF ABDOMEN: CPT

## 2019-12-06 PROCEDURE — 96372 THER/PROPH/DIAG INJ SC/IM: CPT

## 2019-12-06 PROCEDURE — 81003 URINALYSIS AUTO W/O SCOPE: CPT

## 2019-12-06 PROCEDURE — 74176 CT ABD & PELVIS W/O CONTRAST: CPT

## 2019-12-06 PROCEDURE — 96365 THER/PROPH/DIAG IV INF INIT: CPT

## 2019-12-06 PROCEDURE — 80053 COMPREHEN METABOLIC PANEL: CPT

## 2019-12-06 PROCEDURE — 85025 COMPLETE CBC W/AUTO DIFF WBC: CPT

## 2019-12-06 PROCEDURE — 85379 FIBRIN DEGRADATION QUANT: CPT

## 2019-12-06 PROCEDURE — 87205 SMEAR GRAM STAIN: CPT

## 2019-12-06 PROCEDURE — 87086 URINE CULTURE/COLONY COUNT: CPT

## 2019-12-06 PROCEDURE — 83735 ASSAY OF MAGNESIUM: CPT

## 2019-12-06 PROCEDURE — 83605 ASSAY OF LACTIC ACID: CPT

## 2019-12-06 PROCEDURE — 71275 CT ANGIOGRAPHY CHEST: CPT

## 2019-12-06 PROCEDURE — 94760 N-INVAS EAR/PLS OXIMETRY 1: CPT

## 2019-12-06 PROCEDURE — 76770 US EXAM ABDO BACK WALL COMP: CPT

## 2019-12-06 PROCEDURE — 84550 ASSAY OF BLOOD/URIC ACID: CPT

## 2019-12-06 PROCEDURE — 93005 ELECTROCARDIOGRAM TRACING: CPT

## 2019-12-06 PROCEDURE — 99285 EMERGENCY DEPT VISIT HI MDM: CPT

## 2019-12-06 PROCEDURE — 84484 ASSAY OF TROPONIN QUANT: CPT

## 2019-12-06 PROCEDURE — 87040 BLOOD CULTURE FOR BACTERIA: CPT

## 2019-12-06 PROCEDURE — 94660 CPAP INITIATION&MGMT: CPT

## 2019-12-06 PROCEDURE — 87070 CULTURE OTHR SPECIMN AEROBIC: CPT

## 2019-12-06 PROCEDURE — 82805 BLOOD GASES W/O2 SATURATION: CPT

## 2019-12-06 PROCEDURE — 85610 PROTHROMBIN TIME: CPT

## 2019-12-06 PROCEDURE — 36415 COLL VENOUS BLD VENIPUNCTURE: CPT

## 2019-12-06 PROCEDURE — 81001 URINALYSIS AUTO W/SCOPE: CPT

## 2019-12-06 PROCEDURE — 36600 WITHDRAWAL OF ARTERIAL BLOOD: CPT

## 2019-12-06 PROCEDURE — 96375 TX/PRO/DX INJ NEW DRUG ADDON: CPT

## 2019-12-06 PROCEDURE — 85730 THROMBOPLASTIN TIME PARTIAL: CPT

## 2019-12-06 PROCEDURE — 71046 X-RAY EXAM CHEST 2 VIEWS: CPT

## 2019-12-06 RX ADMIN — ACETAMINOPHEN PRN MG: 500 TABLET ORAL at 18:02

## 2019-12-06 RX ADMIN — Medication SCH MG: at 11:01

## 2019-12-06 RX ADMIN — INSULIN ASPART SCH: 100 INJECTION, SOLUTION INTRAVENOUS; SUBCUTANEOUS at 18:15

## 2019-12-06 RX ADMIN — SODIUM CHLORIDE, PRESERVATIVE FREE SCH ML: 5 INJECTION INTRAVENOUS at 19:52

## 2019-12-06 RX ADMIN — INSULIN ASPART SCH: 100 INJECTION, SOLUTION INTRAVENOUS; SUBCUTANEOUS at 20:03

## 2019-12-06 RX ADMIN — IPRATROPIUM BROMIDE AND ALBUTEROL SULFATE SCH ML: .5; 3 SOLUTION RESPIRATORY (INHALATION) at 11:53

## 2019-12-06 RX ADMIN — ASPIRIN 325 MG ORAL TABLET SCH MG: 325 PILL ORAL at 11:01

## 2019-12-06 RX ADMIN — IPRATROPIUM BROMIDE AND ALBUTEROL SULFATE SCH ML: .5; 3 SOLUTION RESPIRATORY (INHALATION) at 15:42

## 2019-12-06 RX ADMIN — FUROSEMIDE SCH: 10 INJECTION, SOLUTION INTRAMUSCULAR; INTRAVENOUS at 14:21

## 2019-12-06 RX ADMIN — THERA TABS SCH EACH: TAB at 11:02

## 2019-12-06 RX ADMIN — INSULIN ASPART SCH: 100 INJECTION, SOLUTION INTRAVENOUS; SUBCUTANEOUS at 13:13

## 2019-12-06 RX ADMIN — LATANOPROST SCH DROPS: 50 SOLUTION OPHTHALMIC at 19:51

## 2019-12-06 RX ADMIN — SODIUM CHLORIDE, PRESERVATIVE FREE SCH ML: 5 INJECTION INTRAVENOUS at 11:04

## 2019-12-06 RX ADMIN — FUROSEMIDE SCH: 10 INJECTION, SOLUTION INTRAMUSCULAR; INTRAVENOUS at 20:48

## 2019-12-06 RX ADMIN — DORZOLAMIDE HYDROCHLORIDE AND TIMOLOL MALEATE SCH DROPS: 20; 5 SOLUTION/ DROPS OPHTHALMIC at 19:51

## 2019-12-06 RX ADMIN — IPRATROPIUM BROMIDE AND ALBUTEROL SULFATE SCH ML: .5; 3 SOLUTION RESPIRATORY (INHALATION) at 20:56

## 2019-12-06 RX ADMIN — ATORVASTATIN CALCIUM SCH MG: 10 TABLET, FILM COATED ORAL at 11:02

## 2019-12-06 NOTE — XR
EXAMINATION TYPE: XR chest 1V portable

 

DATE OF EXAM: 12/6/2019

 

COMPARISON: 8/12/2019

 

HISTORY: Short of breath

 

TECHNIQUE: Single frontal view of the chest is obtained.

 

FINDINGS:  Heart is enlarged. There is pulmonary vascular congestion and bilateral pulmonary airspace
 infiltrates. There is slight blunting of the costophrenic angles. Bony thorax appears intact.

 

IMPRESSION:  There is bilateral pneumonia increased compared to old exam. Cardiomegaly and pulmonary 
congestion related to mild heart failure. Pleural fluid improved compared to last exam. Pulmonary con
gestion unchanged.

## 2019-12-06 NOTE — P.CNPUL
History of Present Illness


Consult date: 12/06/19


Requesting physician: Kamlesh Ramos


Reason for consult: dyspnea, cough, chest pain, pneumonia, abnormal CXR/CT


Chief complaint: Shortness of breath, cough and congestion, fever


History of present illness: 


 This is a 85-year-old [place] patient of Dr. Ramos, with past medical history of 

congestive heart failure with diastolic dysfunction, chronic atrial fibrillation

not on anticoagulation related to frequent falls, hypertension, diabetes 

mellitus type 2, hyperlipidemia, who came into the hospital early this morning 

on 12/06/2019 for complaints of increased shortness of breath, cough, 

congestion, phlegm production with mild hemoptysis, fever.  Patient's symptoms 

started last week after Thanksgiving family gathering.  Became progressively 

worse, last night patient was dry heaving, but no vomiting, had a single episode

of diarrhea.  Denied any increased swelling in her legs, denied chest pain, but 

she is experiencing discomfort in the posterior rib cage to her scapula with 

deep breathing and coughing, chest x-ray on admission showed pulmonary vessel 

congestion and bilateral pulmonary airspace infiltrates consistent with 

pneumonia.  Admission lab work showed white blood cell count of 10.4, hemoglobin

of 10.9, d-dimer was elevated at 4.74 however CT chest did not show evidence of 

pulmonary embolism, it did show bilateral airspace patchy consolidation 

consistent with pneumonia and small right pleural effusion and changes of 

pulmonary hypertension with enlargement of the pulmonary arteries.  EKG showed 

atrial fibrillation with rapid ventricular response with a rate of 116 BPM.  

Patient was significantly short of breath on admission, and hypoxemic requiring 

100% nonrebreather, she was given a dose of IV Lasix started on empiric AmBisome

the form of Zithromax and Rocephin for possibility of community acquired 

pneumonia, and her FiO2 has since been weaned down to 2 L, her pulse ox is 95%, 

she is still tachycardic, and she was given Cardizem infusion for rate control. 

She has a very congested  cough, she has been started on breathing treatments, 

and were consulted for evaluation of shortness of breath related to bilateral 

pneumonia and possibility of congestive heart failure








Review of Systems


All systems: negative


Constitutional: Denies chills, Denies fever


Eyes: denies blurred vision, denies pain


Ears, nose, mouth and throat: Denies headache, Denies sore throat


Cardiovascular: Denies chest pain, Denies shortness of breath


Respiratory: Reports congestion, Reports cough with sputum, Reports dyspnea, Re

ports hemoptysis, Denies cough


Gastrointestinal: Denies abdominal pain, Denies diarrhea, Denies nausea, Denies 

vomiting


Genitourinary: Denies dysuria, Denies hematuria


Musculoskeletal: Denies myalgias


Integumentary: Denies pruritus, Denies rash


Neurological: Denies numbness, Denies weakness


Psychiatric: Denies anxiety, Denies depression


Endocrine: Denies fatigue, Denies weight change





Past Medical History


Past Medical History: Heart Failure, Diabetes Mellitus, Hyperlipidemia, Hyper

tension


Additional Past Medical History / Comment(s): cardiomegaly


History of Any Multi-Drug Resistant Organisms: None Reported


Additional Past Surgical History / Comment(s): eye surgery


Past Psychological History: No Psychological Hx Reported


Smoking Status: Former smoker


Past Alcohol Use History: None Reported


Past Drug Use History: None Reported





Medications and Allergies


                                Home Medications











 Medication  Instructions  Recorded  Confirmed  Type


 


ALPRAZolam [Xanax] 0.5 mg PO HS PRN 09/04/18 12/06/19 History


 


Atorvastatin [Lipitor] 10 mg PO DAILY 09/04/18 12/06/19 History


 


Ferrous Sulfate [Iron (65  mg PO DAILY 09/04/18 12/06/19 History





Elemental)]    


 


metFORMIN HCL [Glucophage] 500 mg PO BID-W/MEALS 09/04/18 12/06/19 History


 


Bimatoprost [Lumigan .01% Ophth 1 drop BOTH EYES HS 08/12/19 12/06/19 History





Soln]    


 


Multivitamins, Thera [Multivitamin 1 tab PO DAILY 08/12/19 12/06/19 History





(formulary)]    


 


Aspirin 325 mg PO DAILY 30 Days #30 tab 08/15/19 12/06/19 Rx


 


Furosemide [Lasix] 40 mg PO DAILY 30 Days #30 tab 08/15/19 12/06/19 Rx


 


Valsartan [Diovan] 40 mg PO AC-SUPPER 30 Days #30 tab 08/15/19 12/06/19 Rx


 


Dorzolamide-Timol 2.23%/0.68% 1 drop BOTH EYES BID 12/06/19 12/06/19 History





[Cosopt]    








                                    Allergies











Allergy/AdvReac Type Severity Reaction Status Date / Time


 


No Known Allergies Allergy   Verified 12/06/19 07:01














Physical Exam


Vitals: 


                                   Vital Signs











  Temp Pulse Pulse Resp BP BP Pulse Ox


 


 12/06/19 11:54   105 H   18   


 


 12/06/19 11:11  97.8 F   105 H  18   87/52  95


 


 12/06/19 08:03        94 L


 


 12/06/19 08:00  97.7 F   105 H  16   101/58  94 L


 


 12/06/19 06:48   89   18  99/60   96


 


 12/06/19 06:30   93   18  103/67   99


 


 12/06/19 05:30   94   18  110/79   99


 


 12/06/19 05:00   89   26 H  139/85   96


 


 12/06/19 04:30   115 H   28 H  144/96   100


 


 12/06/19 04:06  98.5 F  110 H   34 H  144/96   100








                                Intake and Output











 12/05/19 12/06/19 12/06/19





 22:59 06:59 14:59


 


Other:   


 


  Weight  44.452 kg 44.452 kg











 GENERAL EXAM: Alert, very pleasant, 85-year-old [place]ian female, with limited 

English, and her daughter is in attendance and translating for her, weak and 

lethargic, but easily arousable, answering simple questions


HEAD: Normocephalic/atraumatic.


EYES: Normal reaction of pupils, equal size.  Conjunctiva pink, sclera white.


NOSE: Clear with pink turbinates.


THROAT: No erythema or exudates.


NECK: No masses, no JVD, no thyroid enlargement, no adenopathy.


CHEST: No chest wall deformity.  Symmetrical expansion. 


LUNGS: Equal air entry with basilar crackles and crackles over right mid and up

per lobe


CVS: Regular rate and rhythm, normal S1 and S2, no gallops, no murmurs, no rubs


ABDOMEN: Soft, nontender.  No hepatosplenomegaly, normal bowel sounds, no 

guarding or rigidity.


EXTREMITIES: No clubbing, no edema, no cyanosis, 2+ pulses and upper and lower 

extremities.


MUSCULOSKELETAL: Muscle strength and tone normal.


SPINE: No scoliosis or deformity


SKIN: No rashes


CENTRAL NERVOUS SYSTEM: Alert and oriented -3.  No focal deficits, tone is 

normal in all 4 extremities.


PSYCHIATRIC: Alert and oriented -3.  Appropriate affect.  Intact judgment and 

insight.











Results





- Laboratory Findings


CBC and BMP: 


                                 12/06/19 04:27





                                 12/06/19 04:27


PT/INR, D-dimer











PT  11.7 sec (9.0-12.0)   12/06/19  04:27    


 


INR  1.1  (<1.2)   12/06/19  04:27    


 


D-Dimer  4.74 mg/L FEU (<0.60)  H  12/06/19  04:27    








Abnormal lab findings: 


                                  Abnormal Labs











  12/06/19 12/06/19 12/06/19





  04:27 04:27 04:27


 


Hgb  10.9 L  


 


MCV  78.3 L  


 


MCH  22.8 L  


 


MCHC  29.1 L  


 


RDW  15.6 H  


 


Plt Count  114 L  


 


Neutrophils #  9.3 H  


 


Lymphocytes #  0.8 L  


 


D-Dimer    4.74 H


 


BUN   20 H 


 


Creatinine   1.22 H 


 


Glucose   276 H 


 


POC Glucose (mg/dL)   


 


Total Bilirubin   1.5 H 


 


Troponin I   














  12/06/19 12/06/19 12/06/19





  04:27 08:25 11:59


 


Hgb   


 


MCV   


 


MCH   


 


MCHC   


 


RDW   


 


Plt Count   


 


Neutrophils #   


 


Lymphocytes #   


 


D-Dimer   


 


BUN   


 


Creatinine   


 


Glucose   


 


POC Glucose (mg/dL)   145 H  141 H


 


Total Bilirubin   


 


Troponin I  0.044 H*  














- Diagnostic Findings


Chest x-ray: report reviewed, image reviewed


CT scan - chest: report reviewed, image reviewed


Additional studies: 


 EKG reviewed








Assessment and Plan


Plan: 


 Assessment:





#1.  Acute hypoxic respiratory failure related to acute pneumonia, involving 

bilateral lungs, chest x-ray and CTA chest showed bilateral airspace patchy 

consolidation and small right pleural effusion





#2.  Acute exacerbation of chronic diastolic heart failure related to the above





#3.  Chronic atrial fibrillation, not on any chronic anticoagulation related to 

history of falls





#4.  Atrial fibrillation with RVR





#5.  Elevated d-dimer, but no evidence of pulmonary embolism on CT angios chest





#6.  Elevated troponin likely related to hypoxemia and acute exacerbation of CHF





#7.  Chronic congestive heart failure





#8.  History of hypertension





#9.  History of diabetes mellitus





#10.  History of hyperlipidemia





#11.  No history of smoking, but patient does use chewing tobacco





Plan:





Continue with IV Lasix and Zithromax and Rocephin, sputum culture has been sent 

continue with nebulized bronchodilators, chest x-ray and CTA chest have been 

reviewed, no evidence of pulmonary embolism, but did show bilateral airspace 

patchy consolidation consistent with bilateral pneumonia and small right pleural

 effusion.  Continue with nebulized bronchodilators, GI and DVT prophylaxis.  

We'll continue to closely follow





I performed a history & physical examination of the patient and discussed their 

management with my nurse practitioner, Delicia Knowles.  I reviewed the nurse 

practitioner's note and agree with the documented findings and plan of care.  

Lung sounds are positive for crackles at bilateral bases, and right mid and 

upper lobe.  The findings and the impression was discussed with the patient.  I 

attest to the documentation by the nurse practitioner. 





Time with Patient: Greater than 30

## 2019-12-06 NOTE — CONS
CONSULTATION



ATTENDING:

Dr. Ramos.



HISTORY OF PRESENT ILLNESS:

Mrs. Harrell is an 85-year-old female who is originally from [facility].  History is

obtained through the daughter.  The patient does not speak English.  Apparently, she

presented to the hospital with symptoms of progressive dyspnea with cough,

nonproductive over the last day, not associated with any fever.  She was admitted to

the hospital in August of this year and was noted to be in atrial fibrillation.  At

that time, underwent an echocardiogram that showed preserved systolic function, but she

had evidence of mild aortic stenosis with a mean gradient of 14 mmHg with a mean

gradient across the mitral valve of 3.6 mmHg with severe pulmonary hypertension and

severe tricuspid regurgitation.  According to the daughter, she has not had any chest

pain.  She has no dizziness, palpitation, yet she falls.  She is not very steady on her

feet.  She had no documented fever.  No peripheral edema.  No clear PND or orthopnea.

The patient was not anticoagulated during the last admission because of her multiple

falls and her overall status.



MEDICATION:

Her medications at home included aspirin, Lipitor 10 mg daily, furosemide 40 mg daily,

Diovan 40 mg daily.



REVIEW OF SYSTEMS:

Respiratory system:  She has the dyspnea on exertion, the cough, wheezing, but no fever

documented.  GI system:  No recent GI bleed.  No peptic ulcer disease.   system:  No

dysuria or hematuria.  Nervous system:  No stroke or seizure.



PHYSICAL EXAMINATION:

She is an 85-year-old female, alert, mildly dyspneic.  Blood pressure running in the

90s to 110 with a heart rate in the 80s.

HEAD:  Normocephalic.  Eyes:  Sclerae anicteric.

NECK with increased jugular venous distention.

LUNGS with crackles bilaterally.

HEART irregularly irregular S1, S2.  No S3 with systolic murmur at the base radiating

to the neck and systolic murmur at the apex.

ABDOMEN:  Soft, nontender.  Positive bowel sounds.  No organomegaly.

EXTREMITIES:  No edema.  Intact distal pulses.



LAB DATA:

EKG reveals atrial fibrillation, rate of 116 with incomplete right bundle branch block

and right axis deviation.



Chest x-ray showed evidence of infiltrate with bilateral pneumonia with evidence of

mild congestion.  She had a CT angiogram of the chest that showed no evidence of

pulmonary embolism and evidence of pneumonia.



Her lab data revealed a hemoglobin of 10.9, BUN and creatinine 21 and 0.22.  NT proBNP

of 6740.  Troponin of 0.044.



IMPRESSION:

1. Progressive dyspnea with evidence of pneumonia with superimposed mild fluid

    overload.  The patient has preserved systolic function.

2. History of pulmonary hypertension with mild aortic stenosis and mitral stenosis.

3. History of chronic persistent atrial fibrillation, not anticoagulated because of

    multiple falls in the past.

4. History of hypertension.

5. Hyperlipidemia.



RECOMMENDATIONS:

From the cardiac standpoint, I will continue on the IV diuretics. Follow her function.

I will decrease the dose of her Valsartan because of her low blood pressure.  We will

continue to follow her rhythm and depending on her progress, further recommendations

will be made.  The patient has been started on IV antibiotics.  Depending on her

progress, further recommendations will be made.  I have discussed those findings with

the daughter and she is in full understanding and agreement.



Thank you for this consult.  We will follow with you.





HARLEY / HUGHN: [id number] / Job#: [id number]

## 2019-12-06 NOTE — P.HPIM
History of Present Illness


H&P Date: 12/06/19


Chief Complaint: Shortness of breath and fatigue





This is an 85-year-old female patient who presented with complaints of shortness

of breath cough and fatigue.  Patient's daughter is at bedside.  Patient speaks 

very little English.  Per patient's daughter she's been having increasing 

shortness of breath and cough over the past few days.  Patient also reports that

she's been having increased fatigue.  Patient has been coughing but unable to 

cough anything up.  Per patient daughter denies fever or chills.  Patient does 

have a past medical history of atrial fibrillation in which she is not 

maintained on anticoagulation just aspirin, congestive heart failure diabetes 

mellitus, hyperlipidemia, hypertension and chewing tobacco use.  Patient d-dimer

elevated at 4.74.  Chest CTA was completed showing no evidence of pulmonary 

embolism.  Bilateral airspace patchy consolidation consistent with pneumonia.  

Small right pleural effusion.  Changes of pulmonary hypertension with 

enlargement of the pulmonary arteries.  Cardiomegaly.  His x-ray completed 

showing bilateral pneumonia increase compared to old exam cardiomegaly and 

pulmonary congestion related to mild heart failure.  Pleural effusion fluid 

improved compared to last exam pulmonary congestion unchanged.  EKG completed 

showing atrial fibrillation with rapid ventricular response right axis deviation

and complete right bundle branch block.  BNP 6740.  Cardiology services have 

been consulted.  Pulmonary services are consulted.  Patient started on Lovenox 

and Rocephin for antibiotic sputum culture has been ordered.  At this time 

patient is resting comfortably in bed.  Daughter at bedside.  Patient stopping 

some shortness of breath with cough.  Patient denies chest pain.  Patient denies

nausea vomiting or diarrhea.  Patient denies any urinary burning or frequency.





Review of Systems





Please refer to HPI otherwise unremarkable





Past Medical History


Past Medical History: Heart Failure, Diabetes Mellitus, Hyperlipidemia, 

Hypertension


Additional Past Medical History / Comment(s): cardiomegaly


History of Any Multi-Drug Resistant Organisms: None Reported


Additional Past Surgical History / Comment(s): eye surgery


Past Psychological History: No Psychological Hx Reported


Smoking Status: Former smoker


Past Alcohol Use History: None Reported


Past Drug Use History: None Reported





Medications and Allergies


                                Home Medications











 Medication  Instructions  Recorded  Confirmed  Type


 


ALPRAZolam [Xanax] 0.5 mg PO HS PRN 09/04/18 12/06/19 History


 


Atorvastatin [Lipitor] 10 mg PO DAILY 09/04/18 12/06/19 History


 


Ferrous Sulfate [Iron (65  mg PO DAILY 09/04/18 12/06/19 History





Elemental)]    


 


metFORMIN HCL [Glucophage] 500 mg PO BID-W/MEALS 09/04/18 12/06/19 History


 


Bimatoprost [Lumigan .01% Ophth 1 drop BOTH EYES HS 08/12/19 12/06/19 History





Soln]    


 


Multivitamins, Thera [Multivitamin 1 tab PO DAILY 08/12/19 12/06/19 History





(formulary)]    


 


Aspirin 325 mg PO DAILY 30 Days #30 tab 08/15/19 12/06/19 Rx


 


Furosemide [Lasix] 40 mg PO DAILY 30 Days #30 tab 08/15/19 12/06/19 Rx


 


Valsartan [Diovan] 40 mg PO AC-SUPPER 30 Days #30 tab 08/15/19 12/06/19 Rx


 


Dorzolamide-Timol 2.23%/0.68% 1 drop BOTH EYES BID 12/06/19 12/06/19 History





[Cosopt]    








                                    Allergies











Allergy/AdvReac Type Severity Reaction Status Date / Time


 


No Known Allergies Allergy   Verified 12/06/19 07:01














Physical Exam


Vitals: 


                                   Vital Signs











  Temp Pulse Pulse Resp BP BP Pulse Ox


 


 12/06/19 08:03        94 L


 


 12/06/19 08:00  97.7 F   105 H  16   101/58  94 L


 


 12/06/19 06:48   89   18  99/60   96


 


 12/06/19 06:30   93   18  103/67   99


 


 12/06/19 05:30   94   18  110/79   99


 


 12/06/19 05:00   89   26 H  139/85   96


 


 12/06/19 04:30   115 H   28 H  144/96   100


 


 12/06/19 04:06  98.5 F  110 H   34 H  144/96   100








                                Intake and Output











 12/05/19 12/06/19 12/06/19





 22:59 06:59 14:59


 


Other:   


 


  Weight  44.452 kg 














Head normocephalic


Neck supple


Lungs diminished bilaterally.


Heart irregular rate


Abdomen is soft nontender nondistended positive bowel sounds no 

hepatosplenomegaly


Extremities no edema


Neuro alert and orientated to 3.  Language barrier





Results


CBC & Chem 7: 


                                 12/06/19 04:27





                                 12/06/19 04:27


Labs: 


                  Abnormal Lab Results - Last 24 Hours (Table)











  12/06/19 12/06/19 12/06/19 Range/Units





  04:27 04:27 04:27 


 


Hgb  10.9 L    (11.4-16.0)  gm/dL


 


MCV  78.3 L    (80.0-100.0)  fL


 


MCH  22.8 L    (25.0-35.0)  pg


 


MCHC  29.1 L    (31.0-37.0)  g/dL


 


RDW  15.6 H    (11.5-15.5)  %


 


Plt Count  114 L    (150-450)  k/uL


 


Neutrophils #  9.3 H    (1.3-7.7)  k/uL


 


Lymphocytes #  0.8 L    (1.0-4.8)  k/uL


 


D-Dimer    4.74 H  (<0.60)  mg/L FEU


 


BUN   20 H   (7-17)  mg/dL


 


Creatinine   1.22 H   (0.52-1.04)  mg/dL


 


Glucose   276 H   (74-99)  mg/dL


 


POC Glucose (mg/dL)     (75-99)  mg/dL


 


Total Bilirubin   1.5 H   (0.2-1.3)  mg/dL


 


Troponin I     (0.000-0.034)  ng/mL














  12/06/19 12/06/19 Range/Units





  04:27 08:25 


 


Hgb    (11.4-16.0)  gm/dL


 


MCV    (80.0-100.0)  fL


 


MCH    (25.0-35.0)  pg


 


MCHC    (31.0-37.0)  g/dL


 


RDW    (11.5-15.5)  %


 


Plt Count    (150-450)  k/uL


 


Neutrophils #    (1.3-7.7)  k/uL


 


Lymphocytes #    (1.0-4.8)  k/uL


 


D-Dimer    (<0.60)  mg/L FEU


 


BUN    (7-17)  mg/dL


 


Creatinine    (0.52-1.04)  mg/dL


 


Glucose    (74-99)  mg/dL


 


POC Glucose (mg/dL)   145 H  (75-99)  mg/dL


 


Total Bilirubin    (0.2-1.3)  mg/dL


 


Troponin I  0.044 H*   (0.000-0.034)  ng/mL














Assessment and Plan


Assessment: 





1.  Increased shortness of breath secondary to pneumonia.  Chest x-ray completed

 showing bilateral pneumonia increased compared to old exam cardiomegaly and 

pulmonary congestion related to mild heart failure.  Pleural fluid improved 

compared to last exam pulmonary congestion unchanged.  Patient's Rocephin and 

azithromycin.  Sputum culture ordered.  DuoNeb breathing treatments.  Pulmonary 

service is consulted.  Repeat chest x-ray ordered





2.  Atrial fibrillation with RVR.  Cardizem drip has been DC'd.  Cardiology 

services have been consulted.  Patient does have known history of atrial 

fibrillation.  Heart rate controlled.





3.  Elevated d-dimer.  D-dimer elevated at 4.74.  Chest CTA completed showing no

 evidence of pulmonary embolism.  Bilateral airspace patchy consolidation 

consistent with pneumonia.  Small right pleural effusion changes of pulmonary 

hypertension with enlargement of the pulmonary arteries.  Cardiomegaly.





4.  Abnormal troponin.  Troponin 0.044.  Cardiology services have been consulted





5.  Chronic diastolic congestive heart failure.  2-D echo completed in August 2019 showing EF of 50-55%.  BNP 6740. Patient started on IV Lasix 40 mg every 

12.  Cardiology services following





6.  History of paroxysmal atrial fibrillation.  During previous admission in 

August anticoagulation was discussed with patient and family and was decided to 

keep patient on aspirin 325 due to problems of unsteady gait and risk of falling

 and episodes of anemia with refusal of GI evaluation.  





7.  History of essential hypertension





8.  History of diabetes mellitus.  Patient's home metformin dose currently on 

hold.  Sliding scale insulin coverage added





9.  History of hyperlipidemia.  Patient maintained on Lipitor





GI prophylaxis Pepcid.  DVT prophylaxis Lovenox


Pulmonary and cardiology services consulted


Continue IV antibiotics and IV Lasix


Repeat chest x-ray and sputum ordered








Time with Patient: Greater than 30 (Greater than 60% of the total time spent in 

counseling and coordination of care. I performed an examination of the patient 

and discussed their management with the Nurse Practitioner.  I have reviewed the

 Nurse Practitioner's notes and agree with the documented findings and plan of 

care)

## 2019-12-06 NOTE — CT
EXAMINATION TYPE: CT chest angio for PE

 

DATE OF EXAM: 12/6/2019

 

COMPARISON: None

 

HISTORY: SKYE

 

CT DLP: 199.3 mGycm

Automated exposure control for dose reduction was used.

 

CONTRAST: 

Performed with IV Contrast, patient injected with 80 mL of Isovue 370.

 

 

There is patchy atelectasis and infiltrate in both lower lobes posteriorly. Heart is moderately enlar
ged. There is right upper lobe airspace patchy consolidation. Thoracic aorta is atheromatous. There i
s coronary artery calcification.

 

There are large pulmonary arteries. I see no filling defects in the pulmonary arteries. There is a mi
ld right pleural effusion.

 

The bony thorax is intact.

 

IMPRESSION:

No evidence of pulmonary embolism. Bilateral airspace patchy consolidation consistent with pneumonia.
 Small right pleural effusion. Changes of pulmonary hypertension with enlargement of the pulmonary ar
teries. Cardiomegaly.

## 2019-12-06 NOTE — ED
SOB HPI





- General


Chief Complaint: Shortness of Breath


Stated Complaint: Diff Breathing


Time Seen by Provider: 12/06/19 04:24


Source: EMS


Mode of arrival: EMS


Limitations: language barrier





- History of Present Illness


MD Complaint: shortness of breath, cough


-: hour(s)


Consistency: constant


Improves With: oxygen


Worsens With: coughing


Known History Of: other (Atrial fibrillation)


Treatments Prior to Arrival: oxygen





- Related Data


                                Home Medications











 Medication  Instructions  Recorded  Confirmed


 


ALPRAZolam [Xanax] 0.5 mg PO HS PRN 09/04/18 08/12/19


 


Atorvastatin [Lipitor] 10 mg PO DAILY 09/04/18 08/12/19


 


Ferrous Sulfate [Iron (65  mg PO DAILY 09/04/18 08/12/19





Elemental)]   


 


metFORMIN HCL [Glucophage] 500 mg PO BID-W/MEALS 09/04/18 08/12/19


 


Bimatoprost [Lumigan .01% Ophth 1 drop BOTH EYES HS 08/12/19 08/12/19





Soln]   


 


Latanoprost Ophth [Xalatan 0.005%] 1 drop BOTH EYES HS 08/12/19 08/12/19


 


Multivitamins, Thera [Multivitamin 1 tab PO DAILY 08/12/19 08/12/19





(formulary)]   








                                  Previous Rx's











 Medication  Instructions  Recorded


 


Aspirin 325 mg PO DAILY 30 Days #30 tab 08/15/19


 


Furosemide [Lasix] 40 mg PO DAILY 30 Days #30 tab 08/15/19


 


Valsartan [Diovan] 40 mg PO AC-SUPPER 30 Days #30 tab 08/15/19











                                    Allergies











Allergy/AdvReac Type Severity Reaction Status Date / Time


 


No Known Allergies Allergy   Verified 12/06/19 07:01














Review of Systems


ROS Statement: 


Those systems with pertinent positive or pertinent negative responses have been 

documented in the HPI.





ROS Other: All systems not noted in ROS Statement are negative.


Limitations: ROS unobtainable due to patients medical condition (language 

barrier)


Constitutional: Reports: chills, weakness


Respiratory: Reports: cough, dyspnea


Cardiovascular: Denies: chest pain, edema


Gastrointestinal: Denies: abdominal pain, vomiting, diarrhea


Musculoskeletal: Denies: back pain


Skin: Denies: rash


Neurological: Denies: headache, weakness





Past Medical History


Past Medical History: Heart Failure, Diabetes Mellitus, Hyperlipidemia, 

Hypertension


Additional Past Medical History / Comment(s): cardiomegaly


History of Any Multi-Drug Resistant Organisms: None Reported


Additional Past Surgical History / Comment(s): eye surgery


Past Psychological History: No Psychological Hx Reported


Smoking Status: Former smoker


Past Alcohol Use History: None Reported


Past Drug Use History: None Reported





General Exam


Limitations: language barrier


General appearance: alert, in distress


Head exam: Present: atraumatic, normocephalic


Eye exam: Present: normal appearance.  Absent: scleral icterus, conjunctival 

injection


ENT exam: Present: normal oropharynx


Respiratory exam: Present: respiratory distress (Tachypnea), rales, rhonchi.  

Absent: normal lung sounds bilaterally, wheezes, stridor


Cardiovascular Exam: Present: tachycardia, irregular rhythm, normal heart 

sounds.  Absent: systolic murmur, diastolic murmur, rubs, gallop


GI/Abdominal exam: Present: soft.  Absent: distended, tenderness, guarding, 

rebound, mass


Extremities exam: Present: normal inspection, normal capillary refill.  Absent: 

pedal edema, calf tenderness


Back exam: Present: normal inspection.  Absent: CVA tenderness (R), CVA te

nderness (L)


Neurological exam: Present: alert


Skin exam: Present: warm, dry, intact, normal color.  Absent: rash





Course


                                   Vital Signs











  12/06/19 12/06/19 12/06/19





  04:06 04:30 05:00


 


Temperature 98.5 F  


 


Pulse Rate 110 H 115 H 89


 


Respiratory 34 H 28 H 26 H





Rate   


 


Blood Pressure 144/96 144/96 139/85


 


O2 Sat by Pulse 100 100 96





Oximetry   














  12/06/19 12/06/19 12/06/19





  05:30 06:30 06:48


 


Temperature   


 


Pulse Rate 94 93 89


 


Respiratory 18 18 18





Rate   


 


Blood Pressure 110/79 103/67 99/60


 


O2 Sat by Pulse 99 99 96





Oximetry   














Medical Decision Making





- Medical Decision Making





Patient is an 85-year-old woman with dyspnea and cough.  She is found to have 

atrial fibrillation with rapid ventricular rate.  X-ray also does show 

infiltrates.  The patient does have moderately elevated d-dimer and is sent for 

CT which does not reveal pulmonary embolism but does confirm bilateral 

infiltrates.  Patient receiving antibiotics, and will be admitted for further 

care.  The patient's rate has slowed prior to initiation of Cardizem, and this 

is held.  She did receive dose of Lovenox here as anticoagulant.  Case discussed

 with Dr. Ramos, he does request cardiology to see the patient given her atrial

 fibrillation which she had declined treatment for in the past.





- Lab Data


Result diagrams: 


                                 12/06/19 04:27





                                 12/06/19 04:27


                                   Lab Results











  12/06/19 12/06/19 12/06/19 Range/Units





  04:27 04:27 04:27 


 


WBC  10.4    (3.8-10.6)  k/uL


 


RBC  4.79    (3.80-5.40)  m/uL


 


Hgb  10.9 L    (11.4-16.0)  gm/dL


 


Hct  37.5    (34.0-46.0)  %


 


MCV  78.3 L    (80.0-100.0)  fL


 


MCH  22.8 L    (25.0-35.0)  pg


 


MCHC  29.1 L    (31.0-37.0)  g/dL


 


RDW  15.6 H    (11.5-15.5)  %


 


Plt Count  114 L    (150-450)  k/uL


 


Neutrophils %  89    %


 


Lymphocytes %  8    %


 


Monocytes %  1    %


 


Eosinophils %  2    %


 


Basophils %  0    %


 


Neutrophils #  9.3 H    (1.3-7.7)  k/uL


 


Lymphocytes #  0.8 L    (1.0-4.8)  k/uL


 


Monocytes #  0.1    (0-1.0)  k/uL


 


Eosinophils #  0.2    (0-0.7)  k/uL


 


Basophils #  0.0    (0-0.2)  k/uL


 


Hypochromasia  Marked    


 


Microcytosis  Slight    


 


PT    11.7  (9.0-12.0)  sec


 


INR    1.1  (<1.2)  


 


APTT    24.1  (22.0-30.0)  sec


 


D-Dimer    4.74 H  (<0.60)  mg/L FEU


 


Sodium   137   (137-145)  mmol/L


 


Potassium   4.1   (3.5-5.1)  mmol/L


 


Chloride   102   ()  mmol/L


 


Carbon Dioxide   24   (22-30)  mmol/L


 


Anion Gap   11   mmol/L


 


BUN   20 H   (7-17)  mg/dL


 


Creatinine   1.22 H   (0.52-1.04)  mg/dL


 


Est GFR (CKD-EPI)AfAm   47   (>60 ml/min/1.73 sqM)  


 


Est GFR (CKD-EPI)NonAf   41   (>60 ml/min/1.73 sqM)  


 


Glucose   276 H   (74-99)  mg/dL


 


Calcium   8.6   (8.4-10.2)  mg/dL


 


Magnesium   1.6   (1.6-2.3)  mg/dL


 


Total Bilirubin   1.5 H   (0.2-1.3)  mg/dL


 


AST   34   (14-36)  U/L


 


ALT   14   (9-52)  U/L


 


Alkaline Phosphatase   75   ()  U/L


 


Troponin I     (0.000-0.034)  ng/mL


 


NT-Pro-B Natriuret Pep     pg/mL


 


Total Protein   7.7   (6.3-8.2)  g/dL


 


Albumin   4.0   (3.5-5.0)  g/dL














  12/06/19 12/06/19 Range/Units





  04:27 04:27 


 


WBC    (3.8-10.6)  k/uL


 


RBC    (3.80-5.40)  m/uL


 


Hgb    (11.4-16.0)  gm/dL


 


Hct    (34.0-46.0)  %


 


MCV    (80.0-100.0)  fL


 


MCH    (25.0-35.0)  pg


 


MCHC    (31.0-37.0)  g/dL


 


RDW    (11.5-15.5)  %


 


Plt Count    (150-450)  k/uL


 


Neutrophils %    %


 


Lymphocytes %    %


 


Monocytes %    %


 


Eosinophils %    %


 


Basophils %    %


 


Neutrophils #    (1.3-7.7)  k/uL


 


Lymphocytes #    (1.0-4.8)  k/uL


 


Monocytes #    (0-1.0)  k/uL


 


Eosinophils #    (0-0.7)  k/uL


 


Basophils #    (0-0.2)  k/uL


 


Hypochromasia    


 


Microcytosis    


 


PT    (9.0-12.0)  sec


 


INR    (<1.2)  


 


APTT    (22.0-30.0)  sec


 


D-Dimer    (<0.60)  mg/L FEU


 


Sodium    (137-145)  mmol/L


 


Potassium    (3.5-5.1)  mmol/L


 


Chloride    ()  mmol/L


 


Carbon Dioxide    (22-30)  mmol/L


 


Anion Gap    mmol/L


 


BUN    (7-17)  mg/dL


 


Creatinine    (0.52-1.04)  mg/dL


 


Est GFR (CKD-EPI)AfAm    (>60 ml/min/1.73 sqM)  


 


Est GFR (CKD-EPI)NonAf    (>60 ml/min/1.73 sqM)  


 


Glucose    (74-99)  mg/dL


 


Calcium    (8.4-10.2)  mg/dL


 


Magnesium    (1.6-2.3)  mg/dL


 


Total Bilirubin    (0.2-1.3)  mg/dL


 


AST    (14-36)  U/L


 


ALT    (9-52)  U/L


 


Alkaline Phosphatase    ()  U/L


 


Troponin I  0.044 H*   (0.000-0.034)  ng/mL


 


NT-Pro-B Natriuret Pep   6740  pg/mL


 


Total Protein    (6.3-8.2)  g/dL


 


Albumin    (3.5-5.0)  g/dL














- EKG Data


-: EKG Interpreted by Me


EKG shows normal: axis (Right axis.), intervals (Normal), QRS complexes 

(Incomplete right bundle branch block.)


Rate: tachycardia (Rate approximately 116 bpm)


Interpretation: nonspecific ST-T wave changes, other (Atrial fibrillation with 

rate approximately 116 bpm)





Disposition


Clinical Impression: 


 Pneumonia, Atrial fibrillation with rapid ventricular response





Disposition: ADMITTED AS IP TO THIS HOSP


Condition: Poor

## 2019-12-07 LAB
ALBUMIN SERPL-MCNC: 3.1 G/DL (ref 3.5–5)
ALP SERPL-CCNC: 58 U/L (ref 38–126)
ALT SERPL-CCNC: 37 U/L (ref 9–52)
ANION GAP SERPL CALC-SCNC: 11 MMOL/L
AST SERPL-CCNC: 95 U/L (ref 14–36)
BASOPHILS # BLD AUTO: 0 K/UL (ref 0–0.2)
BASOPHILS NFR BLD AUTO: 0 %
BUN SERPL-SCNC: 38 MG/DL (ref 7–17)
CALCIUM SPEC-MCNC: 8.2 MG/DL (ref 8.4–10.2)
CHLORIDE SERPL-SCNC: 100 MMOL/L (ref 98–107)
CO2 SERPL-SCNC: 23 MMOL/L (ref 22–30)
EOSINOPHIL # BLD AUTO: 0 K/UL (ref 0–0.7)
EOSINOPHIL NFR BLD AUTO: 0 %
ERYTHROCYTE [DISTWIDTH] IN BLOOD BY AUTOMATED COUNT: 4.58 M/UL (ref 3.8–5.4)
ERYTHROCYTE [DISTWIDTH] IN BLOOD: 15.7 % (ref 11.5–15.5)
GLUCOSE BLD-MCNC: 125 MG/DL (ref 75–99)
GLUCOSE BLD-MCNC: 145 MG/DL (ref 75–99)
GLUCOSE BLD-MCNC: 172 MG/DL (ref 75–99)
GLUCOSE BLD-MCNC: 99 MG/DL (ref 75–99)
GLUCOSE SERPL-MCNC: 111 MG/DL (ref 74–99)
HCT VFR BLD AUTO: 35.3 % (ref 34–46)
HGB BLD-MCNC: 10.7 GM/DL (ref 11.4–16)
LYMPHOCYTES # SPEC AUTO: 1.8 K/UL (ref 1–4.8)
LYMPHOCYTES NFR SPEC AUTO: 10 %
MCH RBC QN AUTO: 23.3 PG (ref 25–35)
MCHC RBC AUTO-ENTMCNC: 30.2 G/DL (ref 31–37)
MCV RBC AUTO: 77.1 FL (ref 80–100)
MONOCYTES # BLD AUTO: 0.4 K/UL (ref 0–1)
MONOCYTES NFR BLD AUTO: 2 %
NEUTROPHILS # BLD AUTO: 16.1 K/UL (ref 1.3–7.7)
NEUTROPHILS NFR BLD AUTO: 88 %
PLATELET # BLD AUTO: 101 K/UL (ref 150–450)
POTASSIUM SERPL-SCNC: 4.7 MMOL/L (ref 3.5–5.1)
PROT SERPL-MCNC: 6.5 G/DL (ref 6.3–8.2)
SODIUM SERPL-SCNC: 134 MMOL/L (ref 137–145)
WBC # BLD AUTO: 18.4 K/UL (ref 3.8–10.6)

## 2019-12-07 RX ADMIN — SODIUM CHLORIDE, PRESERVATIVE FREE SCH ML: 5 INJECTION INTRAVENOUS at 20:01

## 2019-12-07 RX ADMIN — THERA TABS SCH EACH: TAB at 08:11

## 2019-12-07 RX ADMIN — FUROSEMIDE SCH: 10 INJECTION, SOLUTION INTRAMUSCULAR; INTRAVENOUS at 13:03

## 2019-12-07 RX ADMIN — INSULIN ASPART SCH: 100 INJECTION, SOLUTION INTRAVENOUS; SUBCUTANEOUS at 20:09

## 2019-12-07 RX ADMIN — INSULIN ASPART SCH: 100 INJECTION, SOLUTION INTRAVENOUS; SUBCUTANEOUS at 17:27

## 2019-12-07 RX ADMIN — INSULIN ASPART SCH UNIT: 100 INJECTION, SOLUTION INTRAVENOUS; SUBCUTANEOUS at 12:18

## 2019-12-07 RX ADMIN — CEFAZOLIN SCH MLS/HR: 330 INJECTION, POWDER, FOR SOLUTION INTRAMUSCULAR; INTRAVENOUS at 14:00

## 2019-12-07 RX ADMIN — AZITHROMYCIN SCH MG: 500 TABLET, FILM COATED ORAL at 08:11

## 2019-12-07 RX ADMIN — IPRATROPIUM BROMIDE AND ALBUTEROL SULFATE SCH ML: .5; 3 SOLUTION RESPIRATORY (INHALATION) at 13:08

## 2019-12-07 RX ADMIN — DORZOLAMIDE HYDROCHLORIDE AND TIMOLOL MALEATE SCH: 20; 5 SOLUTION/ DROPS OPHTHALMIC at 08:12

## 2019-12-07 RX ADMIN — ENOXAPARIN SODIUM SCH MG: 40 INJECTION SUBCUTANEOUS at 06:29

## 2019-12-07 RX ADMIN — Medication SCH MG: at 08:12

## 2019-12-07 RX ADMIN — SODIUM CHLORIDE, PRESERVATIVE FREE SCH: 5 INJECTION INTRAVENOUS at 08:12

## 2019-12-07 RX ADMIN — ASPIRIN 325 MG ORAL TABLET SCH MG: 325 PILL ORAL at 08:11

## 2019-12-07 RX ADMIN — INSULIN ASPART SCH: 100 INJECTION, SOLUTION INTRAVENOUS; SUBCUTANEOUS at 06:24

## 2019-12-07 RX ADMIN — IPRATROPIUM BROMIDE AND ALBUTEROL SULFATE SCH ML: .5; 3 SOLUTION RESPIRATORY (INHALATION) at 08:40

## 2019-12-07 RX ADMIN — IPRATROPIUM BROMIDE AND ALBUTEROL SULFATE SCH ML: .5; 3 SOLUTION RESPIRATORY (INHALATION) at 20:33

## 2019-12-07 RX ADMIN — ATORVASTATIN CALCIUM SCH MG: 10 TABLET, FILM COATED ORAL at 08:12

## 2019-12-07 RX ADMIN — IPRATROPIUM BROMIDE AND ALBUTEROL SULFATE SCH ML: .5; 3 SOLUTION RESPIRATORY (INHALATION) at 16:11

## 2019-12-07 RX ADMIN — LATANOPROST SCH DROPS: 50 SOLUTION OPHTHALMIC at 20:10

## 2019-12-07 RX ADMIN — FAMOTIDINE SCH MG: 20 TABLET, FILM COATED ORAL at 08:11

## 2019-12-07 RX ADMIN — DORZOLAMIDE HYDROCHLORIDE AND TIMOLOL MALEATE SCH DROPS: 20; 5 SOLUTION/ DROPS OPHTHALMIC at 20:10

## 2019-12-07 NOTE — US
EXAMINATION TYPE: US kidneys/renal and bladder

 

DATE OF EXAM: 12/7/2019

 

COMPARISON: NONE

 

CLINICAL HISTORY: shyann.

 

EXAM MEASUREMENTS:

 

Right Kidney:  9.2 x 3.6 x 3.5 cm

Left Kidney: 9.4 x 4.5 x 3.9 cm

 

 

 

Right Kidney: No hydronephrosis. Multiple subcentimeter cystic areas visualized, largest measuring 0.
6 cm  

Left Kidney: No hydronephrosis. Cystic area mid pole measuring 0.6 cm   

Bladder: wnl as visualized, not fully distended therefore the bladder is limited in assessment.

**Bilateral Jets seen: No, bladder not distended 

 

There is no evidence for hydronephrosis at this point in time.  No nephrolithiasis is seen.  

 

 

Incidental finding: Just superior to the aorta, there is a hypoechoic area visualized measuring 2.8 x
 1.8 x 2.4 cm with vascularity- possible lymph node vs other. Small amount of free fluid visualized i
n Lamb's pouch.

 

IMPRESSION:

1. There is a 2.8 cm hypoechoic area adjacent to the aorta of indeterminate etiology. Correlation wit
h CT of the abdomen could BE obtained to assess for a area of lymphadenopathy or mass.

2. A trace amount of free fluid in Morison's pouch.

3. No hydronephrosis or nephrolithiasis. There are Subcentimeter hypoechoic lesions in the kidneys to
o small to characterize but likely cysts.

## 2019-12-07 NOTE — XR
EXAMINATION TYPE: XR chest 1V portable

 

DATE OF EXAM: 12/7/2019

 

HISTORY: pneumonia.

 

REFERENCE: Previous study dated 12/6/2019.

 

FINDINGS: The heart is enlarged. There is improved aeration of both lungs. There are small, bilateral
 pleural effusions.

 

IMPRESSION: 

 

IMPROVED AERATION BOTH LUNGS.

## 2019-12-07 NOTE — P.PN
Subjective


Progress Note Date: 12/07/19


This is a pleasant 85-year-old female from [place]  does not speak English.  

Initially presented to the hospital with complaints of progressive dyspnea with 

cough very little sputum production.  Has a history of atrial fibrillation but 

has not been anticoagulated due to frequent falls.  History of chronic diastolic

congestive heart failure and severe pulmonary hypertension.   has been on 

Diovan which was decreased to 20 mg daily yesterday and has been on Lasix 40 mg 

IV every 12 hours.  She did undergo computed tomography scan of the chest which 

confirmed pneumonia.  Patient's BUN and creatinine have jumped the initial 

creatinine of 1.2 to and subsequent this morning of 2.73.  Overall according to 

the daughter the patient feels seems to be feeling somewhat better.  She is 

breathing easier.  She denies any complaints of pain.  She feels more awake 

today.  Blood pressure remains low.  She was started on IV fluids last night due

to a blood pressure in the 60s.








Objective





- Vital Signs


Vital signs: 


                                   Vital Signs











Temp  97.4 F L  12/07/19 11:23


 


Pulse  93   12/07/19 11:23


 


Resp  18   12/07/19 11:23


 


BP  89/58   12/07/19 11:23


 


Pulse Ox  94 L  12/07/19 11:23








                                 Intake & Output











 12/06/19 12/07/19 12/07/19





 18:59 06:59 18:59


 


Intake Total  50 118


 


Balance  50 118


 


Weight 44.452 kg 42.3 kg 


 


Intake:   


 


  Oral  50 118


 


Other:   


 


  Voiding Method Toilet  Toilet


 


  # Voids  2 














- Exam


PHYSICAL EXAMINATION: 





HEENT: Head is atraumatic, normocephalic.  Pupils equal, round.  Neck is supple.

 There is no elevated jugular venous pressure.





HEART EXAMINATION: Heart sounds irregularly irregular, S1 and S2 with a systolic

murmur at the base radiating to the neck and a systolic murmur at the apex.





CHEST EXAMINATION: Lungs reveal scattered rhonchi with crackles noted to 

bilateral bases. No chest wall tenderness is noted on palpation or with deep 

breathing.





ABDOMEN:  Soft, nontender. Bowel sounds are heard. No organomegaly noted.


 


EXTREMITIES: 2+ peripheral pulses with no evidence of peripheral edema and no 

calf tenderness noted.





NEUROLOGIC patient is awake, alert and seems to be oriented x3 though she does 

not speak English.


 


.


 











- Labs


CBC & Chem 7: 


                                 12/07/19 06:20





                                 12/07/19 06:20


Labs: 


                  Abnormal Lab Results - Last 24 Hours (Table)











  12/06/19 12/06/19 12/06/19 Range/Units





  17:12 20:01 20:56 


 


WBC     (3.8-10.6)  k/uL


 


Hgb     (11.4-16.0)  gm/dL


 


MCV     (80.0-100.0)  fL


 


MCH     (25.0-35.0)  pg


 


MCHC     (31.0-37.0)  g/dL


 


RDW     (11.5-15.5)  %


 


Plt Count     (150-450)  k/uL


 


Neutrophils #     (1.3-7.7)  k/uL


 


Sodium     (137-145)  mmol/L


 


BUN     (7-17)  mg/dL


 


Creatinine     (0.52-1.04)  mg/dL


 


Glucose     (74-99)  mg/dL


 


POC Glucose (mg/dL)  131 H  116 H  123 H  (75-99)  mg/dL


 


Calcium     (8.4-10.2)  mg/dL


 


Total Bilirubin     (0.2-1.3)  mg/dL


 


AST     (14-36)  U/L


 


Albumin     (3.5-5.0)  g/dL














  12/07/19 12/07/19 12/07/19 Range/Units





  06:19 06:20 06:20 


 


WBC    18.4 H  (3.8-10.6)  k/uL


 


Hgb    10.7 L  (11.4-16.0)  gm/dL


 


MCV    77.1 L  (80.0-100.0)  fL


 


MCH    23.3 L  (25.0-35.0)  pg


 


MCHC    30.2 L  (31.0-37.0)  g/dL


 


RDW    15.7 H  (11.5-15.5)  %


 


Plt Count    101 L  (150-450)  k/uL


 


Neutrophils #    16.1 H  (1.3-7.7)  k/uL


 


Sodium   134 L   (137-145)  mmol/L


 


BUN   38 H   (7-17)  mg/dL


 


Creatinine   2.73 H   (0.52-1.04)  mg/dL


 


Glucose   111 H   (74-99)  mg/dL


 


POC Glucose (mg/dL)  125 H    (75-99)  mg/dL


 


Calcium   8.2 L   (8.4-10.2)  mg/dL


 


Total Bilirubin   2.0 H   (0.2-1.3)  mg/dL


 


AST   95 H   (14-36)  U/L


 


Albumin   3.1 L   (3.5-5.0)  g/dL














  12/07/19 Range/Units





  12:06 


 


WBC   (3.8-10.6)  k/uL


 


Hgb   (11.4-16.0)  gm/dL


 


MCV   (80.0-100.0)  fL


 


MCH   (25.0-35.0)  pg


 


MCHC   (31.0-37.0)  g/dL


 


RDW   (11.5-15.5)  %


 


Plt Count   (150-450)  k/uL


 


Neutrophils #   (1.3-7.7)  k/uL


 


Sodium   (137-145)  mmol/L


 


BUN   (7-17)  mg/dL


 


Creatinine   (0.52-1.04)  mg/dL


 


Glucose   (74-99)  mg/dL


 


POC Glucose (mg/dL)  172 H  (75-99)  mg/dL


 


Calcium   (8.4-10.2)  mg/dL


 


Total Bilirubin   (0.2-1.3)  mg/dL


 


AST   (14-36)  U/L


 


Albumin   (3.5-5.0)  g/dL








                      Microbiology - Last 24 Hours (Table)











 12/06/19 12:01 Gram Stain - Preliminary





 Sputum Sputum Culture - Preliminary


 


 12/06/19 04:45 Blood Culture - Preliminary





 Blood    No Growth after 24 hours














Assessment and Plan


Assessment: 


#1 progressive dyspnea with evidence of pneumonia with superimposed mild fluid 

overload


2 chronic diastolic congestive heart failure


#3 history of pulmonary hypertension and mild aortic stenosis and atrial 

stenosis


#4 history of chronic persistent atrial fibrillation, not anticoagulated because

of multiple falls in the past


#5 history of hypertension, currently hypotensive


#6 acute kidney injury





Plan: 


From cardiology's perspective, continue IV fluids and monitor the patient's 

respiratory status closely.  Consult nephrology.  Stop the Diovan and IV Lasix 

for now.  Continue to monitor renal function.  We will continue to follow the 

patient provide further recommendations accordingly.





NP note has been reviewed, I agree with a documented findings and plan of care. 

Patient was seen and examined.

## 2019-12-07 NOTE — P.PN
Subjective


Progress Note Date: 12/07/19


Principal diagnosis: 


 Acute hypoxic respiratory failure related to acute pneumonia, involving 

bilateral lungs and acute exacerbation of chronic diastolic heart failure





This is a 85-year-old [place] patient of Dr. Ramos, with past medical history of 

congestive heart failure with diastolic dysfunction, chronic atrial fibrillation

not on anticoagulation related to frequent falls, hypertension, diabetes 

mellitus type 2, hyperlipidemia, who came into the hospital early this morning o

n 12/06/2019 for complaints of increased shortness of breath, cough, congestion,

phlegm production with mild hemoptysis, fever.  Patient's symptoms started last 

week after Thanksgiving family gathering.  Became progressively worse, last 

night patient was dry heaving, but no vomiting, had a single episode of 

diarrhea.  Denied any increased swelling in her legs, denied chest pain, but she

is experiencing discomfort in the posterior rib cage to her scapula with deep 

breathing and coughing, chest x-ray on admission showed pulmonary vessel 

congestion and bilateral pulmonary airspace infiltrates consistent with 

pneumonia.  Admission lab work showed white blood cell count of 10.4, hemoglobin

of 10.9, d-dimer was elevated at 4.74 however CT chest did not show evidence of 

pulmonary embolism, it did show bilateral airspace patchy consolidation 

consistent with pneumonia and small right pleural effusion and changes of 

pulmonary hypertension with enlargement of the pulmonary arteries.  EKG showed a

trial fibrillation with rapid ventricular response with a rate of 116 BPM.  

Patient was significantly short of breath on admission, and hypoxemic requiring 

100% nonrebreather, she was given a dose of IV Lasix started on empiric AmBisome

the form of Zithromax and Rocephin for possibility of community acquired 

pneumonia, and her FiO2 has since been weaned down to 2 L, her pulse ox is 95%, 

she is still tachycardic, and she was given Cardizem infusion for rate control. 

She has a very congested  cough, she has been started on breathing treatments, 

and were consulted for evaluation of shortness of breath related to bilateral 

pneumonia and possibility of congestive heart failure





On 12/07/2019 patient seen in follow-up on selective care unit, yesterday 

patient developed hypotension, with systolic as low as 60 mmHg, and her doses of

afternoon Lasix and her morning dose of Lasix had been held.  Patient was 

increasingly more lethargic, not eating much, but has not had a fever, she is on

room air, with a pulse ox of 94%.  She is on combination of Rocephin and 

Zithromax for antibiotic coverage, blood culture and sputum culture so far are 

pending, preliminary sputum culture shows few gram-positive bacilli, fungal 

cultures pending.  Clinically patient appears to be better today, she is more a

wake on today's exam, she has not had any further nausea or vomiting, her back 

pain over right upper back is improving.  Patient's family brought food from 

home, and patient is attempting to eat.  Blood pressure today is 89/58, with a 

mean of 68, room air pulse ox is 94%, no fever, lung sounds reveal diminished 

breath sounds with bibasilar crackles, no significant rhonchi, patient still has

very congested cough and bringing up much sputum today.  No complaints of chest 

pain, no lower extremity swelling, today's labs have been reviewed showing white

blood cell count 18.4, hemoglobin is 10.7, serum sodium is 134, the rest of the 

electrolytes were within normal limits, BUN is 38 and creatinine is 2.73 and 

there has been worsening of her renal function since admission.








Objective





- Vital Signs


Vital signs: 


                                   Vital Signs











Temp  97.4 F L  12/07/19 11:23


 


Pulse  93   12/07/19 11:23


 


Resp  18   12/07/19 11:23


 


BP  89/58   12/07/19 11:23


 


Pulse Ox  94 L  12/07/19 11:23








                                 Intake & Output











 12/06/19 12/07/19 12/07/19





 18:59 06:59 18:59


 


Intake Total  50 118


 


Balance  50 118


 


Weight 44.452 kg 42.3 kg 


 


Intake:   


 


  Oral  50 118


 


Other:   


 


  Voiding Method Toilet  Toilet


 


  # Voids  2 














- Exam


GENERAL EXAM: Alert, very pleasant, 85-year-old [place] female, with limited 

English, patient's family members including 2 daughters 2 sons and son-in-law 

are in the room, and translating for the patient.  Patient appears to be more 

awake and responsive on today's exam, she sits up in bed, and is attempting to 

eat food brought in from home


HEAD: Normocephalic/atraumatic.


EYES: Normal reaction of pupils, equal size.  Conjunctiva pink, sclera white.


NOSE: Clear with pink turbinates.


THROAT: No erythema or exudates.


NECK: No masses, no JVD, no thyroid enlargement, no adenopathy.


CHEST: No chest wall deformity.  Symmetrical expansion. 


LUNGS: Equal air entry with basilar crackles and crackles over right mid and 

upper lobe


CVS: Regular rate and rhythm, normal S1 and S2, no gallops, no murmurs, no rubs


ABDOMEN: Soft, nontender.  No hepatosplenomegaly, normal bowel sounds, no guar

ding or rigidity.


EXTREMITIES: No clubbing, no edema, no cyanosis, 2+ pulses and upper and lower 

extremities.


MUSCULOSKELETAL: Muscle strength and tone normal.


SPINE: No scoliosis or deformity


SKIN: No rashes


CENTRAL NERVOUS SYSTEM: Alert and oriented -3.  No focal deficits, tone is 

normal in all 4 extremities.


PSYCHIATRIC: Alert and oriented -3.  Appropriate affect.  Intact judgment and 

insight








- Labs


CBC & Chem 7: 


                                 12/07/19 06:20





                                 12/07/19 06:20


Labs: 


                  Abnormal Lab Results - Last 24 Hours (Table)











  12/06/19 12/06/19 12/06/19 Range/Units





  17:12 20:01 20:56 


 


WBC     (3.8-10.6)  k/uL


 


Hgb     (11.4-16.0)  gm/dL


 


MCV     (80.0-100.0)  fL


 


MCH     (25.0-35.0)  pg


 


MCHC     (31.0-37.0)  g/dL


 


RDW     (11.5-15.5)  %


 


Plt Count     (150-450)  k/uL


 


Neutrophils #     (1.3-7.7)  k/uL


 


Sodium     (137-145)  mmol/L


 


BUN     (7-17)  mg/dL


 


Creatinine     (0.52-1.04)  mg/dL


 


Glucose     (74-99)  mg/dL


 


POC Glucose (mg/dL)  131 H  116 H  123 H  (75-99)  mg/dL


 


Calcium     (8.4-10.2)  mg/dL


 


Total Bilirubin     (0.2-1.3)  mg/dL


 


AST     (14-36)  U/L


 


Albumin     (3.5-5.0)  g/dL














  12/07/19 12/07/19 12/07/19 Range/Units





  06:19 06:20 06:20 


 


WBC    18.4 H  (3.8-10.6)  k/uL


 


Hgb    10.7 L  (11.4-16.0)  gm/dL


 


MCV    77.1 L  (80.0-100.0)  fL


 


MCH    23.3 L  (25.0-35.0)  pg


 


MCHC    30.2 L  (31.0-37.0)  g/dL


 


RDW    15.7 H  (11.5-15.5)  %


 


Plt Count    101 L  (150-450)  k/uL


 


Neutrophils #    16.1 H  (1.3-7.7)  k/uL


 


Sodium   134 L   (137-145)  mmol/L


 


BUN   38 H   (7-17)  mg/dL


 


Creatinine   2.73 H   (0.52-1.04)  mg/dL


 


Glucose   111 H   (74-99)  mg/dL


 


POC Glucose (mg/dL)  125 H    (75-99)  mg/dL


 


Calcium   8.2 L   (8.4-10.2)  mg/dL


 


Total Bilirubin   2.0 H   (0.2-1.3)  mg/dL


 


AST   95 H   (14-36)  U/L


 


Albumin   3.1 L   (3.5-5.0)  g/dL














  12/07/19 Range/Units





  12:06 


 


WBC   (3.8-10.6)  k/uL


 


Hgb   (11.4-16.0)  gm/dL


 


MCV   (80.0-100.0)  fL


 


MCH   (25.0-35.0)  pg


 


MCHC   (31.0-37.0)  g/dL


 


RDW   (11.5-15.5)  %


 


Plt Count   (150-450)  k/uL


 


Neutrophils #   (1.3-7.7)  k/uL


 


Sodium   (137-145)  mmol/L


 


BUN   (7-17)  mg/dL


 


Creatinine   (0.52-1.04)  mg/dL


 


Glucose   (74-99)  mg/dL


 


POC Glucose (mg/dL)  172 H  (75-99)  mg/dL


 


Calcium   (8.4-10.2)  mg/dL


 


Total Bilirubin   (0.2-1.3)  mg/dL


 


AST   (14-36)  U/L


 


Albumin   (3.5-5.0)  g/dL








                      Microbiology - Last 24 Hours (Table)











 12/06/19 12:01 Gram Stain - Preliminary





 Sputum Sputum Culture - Preliminary


 


 12/06/19 04:45 Blood Culture - Preliminary





 Blood    No Growth after 24 hours














Assessment and Plan


Plan: 


 Assessment:





#1.  Acute hypoxic respiratory failure related to acute pneumonia, involving 

bilateral lungs, chest x-ray and CTA chest showed bilateral airspace patchy 

consolidation and small right pleural effusion





#2.  Acute exacerbation of chronic diastolic heart failure related to the above





#3.  Acute kidney injury related to ATN, dehydration, diuretic therapy





#4.  Chronic atrial fibrillation, not on any chronic anticoagulation related to 

history of falls





#5.  Atrial fibrillation with RVR





#6.  Elevated d-dimer, but no evidence of pulmonary embolism on CT angios chest





#7.  Elevated troponin likely related to hypoxemia and acute exacerbation of CHF





#8.  Chronic congestive heart failure





#9.  History of hypertension





#10.  History of diabetes mellitus





#11.  History of hyperlipidemia





#12.  No history of smoking, but patient does use chewing tobacco





Plan:





Hold Lasix, gentle IV hydration with 0.9 normal saline at a rate of 50 ML per 

hour, patient has significant worsening of her renal function on today's labs, 

however patient's A. fib since to be better controlled today, no fever, patient 

seems to be more awake, and she is actively attempting to some oral intake with 

food from home.  She is on room air, she states her back pain is subsiding, she 

remains on combination of Zithromax and Rocephin, awaiting final sputum 

cultures, today's chest x-ray shows improvement in aeration in both lungs, and 

small bilateral pleural effusions.  Encourage oral intake, continue GI and DVT 

prophylaxis, recheck labs in the morning


I performed a history & physical examination of the patient and discussed their 

management with my nurse practitioner, Delicia Knowles.  I reviewed the nurse 

practitioner's note and agree with the documented findings and plan of care.  

Lung sounds are positive for crackles at bilateral bases, and right mid and 

upper lobe.  The findings and the impression was discussed with the patient.  I 

attest to the documentation by the nurse practitioner. 





Time with Patient: Less than 30

## 2019-12-07 NOTE — P.PN
Subjective


Progress Note Date: 12/07/19








This is an 85-year-old female patient who presented with complaints of shortness

of breath cough and fatigue.  Patient's daughter is at bedside.  Patient speaks 

very little English.  Per patient's daughter she's been having increasing 

shortness of breath and cough over the past few days.  Patient also reports that

she's been having increased fatigue.  Patient has been coughing but unable to 

cough anything up.  Per patient daughter denies fever or chills.  Patient does 

have a past medical history of atrial fibrillation in which she is not 

maintained on anticoagulation just aspirin, congestive heart failure diabetes 

mellitus, hyperlipidemia, hypertension and chewing tobacco use.  Patient d-dimer

elevated at 4.74.  Chest CTA was completed showing no evidence of pulmonary 

embolism.  Bilateral airspace patchy consolidation consistent with pneumonia.  

Small right pleural effusion.  Changes of pulmonary hypertension with 

enlargement of the pulmonary arteries.  Cardiomegaly.  His x-ray completed sh

owing bilateral pneumonia increase compared to old exam cardiomegaly and 

pulmonary congestion related to mild heart failure.  Pleural effusion fluid 

improved compared to last exam pulmonary congestion unchanged.  EKG completed 

showing atrial fibrillation with rapid ventricular response right axis deviation

and complete right bundle branch block.  BNP 6740.  Cardiology services have 

been consulted.  Pulmonary services are consulted.  Patient started on Lovenox 

and Rocephin for antibiotic sputum culture has been ordered.  At this time 

patient is resting comfortably in bed.  Daughter at bedside.  Patient stopping 

some shortness of breath with cough.  Patient denies chest pain.  Patient denies

nausea vomiting or diarrhea.  Patient denies any urinary burning or frequency.








On 12/07/2019 Patient was seen and examined, on the telemetry floor she is alert

and oriented 3 in no apparent distress, she is reporting improvement in her 

shortness of breath, there is no chest pain no fever or chills no headache or 

dizziness no chest pain no nausea or vomiting no abdominal pain no diarrhea and 

no urinary symptoms, there is worsening in her laboratory data was elevated 

white blood count to 18.4 and elevated BUN at 38 and elevated creatinine at 2.73





Objective





- Vital Signs


Vital signs: 


                                   Vital Signs











Temp  97.4 F L  12/07/19 11:23


 


Pulse  80   12/07/19 13:22


 


Resp  18   12/07/19 11:23


 


BP  89/58   12/07/19 11:23


 


Pulse Ox  94 L  12/07/19 11:23








                                 Intake & Output











 12/06/19 12/07/19 12/07/19





 18:59 06:59 18:59


 


Intake Total  50 118


 


Balance  50 118


 


Weight 44.452 kg 42.3 kg 


 


Intake:   


 


  Oral  50 118


 


Other:   


 


  Voiding Method Toilet  Toilet


 


  # Voids  2 














- Exam





In general patient is alert and oriented 3 daughter is translating questions 

and answers in no apparent distress


Head normocephalic and atraumatic


Neck supple no JVD no goiter


Lungs diminished bilaterally, no crackles no wheezing


Heart irregular rate no gallops no murmurs


Abdomen is soft nontender nondistended positive bowel sounds no 

hepatosplenomegaly


Extremities no edema no cyanosis or clubbing


Neuro no gross focal deficits








- Labs


CBC & Chem 7: 


                                 12/07/19 06:20





                                 12/07/19 06:20


Labs: 


                  Abnormal Lab Results - Last 24 Hours (Table)











  12/06/19 12/06/19 12/06/19 Range/Units





  17:12 20:01 20:56 


 


WBC     (3.8-10.6)  k/uL


 


Hgb     (11.4-16.0)  gm/dL


 


MCV     (80.0-100.0)  fL


 


MCH     (25.0-35.0)  pg


 


MCHC     (31.0-37.0)  g/dL


 


RDW     (11.5-15.5)  %


 


Plt Count     (150-450)  k/uL


 


Neutrophils #     (1.3-7.7)  k/uL


 


Sodium     (137-145)  mmol/L


 


BUN     (7-17)  mg/dL


 


Creatinine     (0.52-1.04)  mg/dL


 


Glucose     (74-99)  mg/dL


 


POC Glucose (mg/dL)  131 H  116 H  123 H  (75-99)  mg/dL


 


Calcium     (8.4-10.2)  mg/dL


 


Total Bilirubin     (0.2-1.3)  mg/dL


 


AST     (14-36)  U/L


 


Albumin     (3.5-5.0)  g/dL














  12/07/19 12/07/19 12/07/19 Range/Units





  06:19 06:20 06:20 


 


WBC    18.4 H  (3.8-10.6)  k/uL


 


Hgb    10.7 L  (11.4-16.0)  gm/dL


 


MCV    77.1 L  (80.0-100.0)  fL


 


MCH    23.3 L  (25.0-35.0)  pg


 


MCHC    30.2 L  (31.0-37.0)  g/dL


 


RDW    15.7 H  (11.5-15.5)  %


 


Plt Count    101 L  (150-450)  k/uL


 


Neutrophils #    16.1 H  (1.3-7.7)  k/uL


 


Sodium   134 L   (137-145)  mmol/L


 


BUN   38 H   (7-17)  mg/dL


 


Creatinine   2.73 H   (0.52-1.04)  mg/dL


 


Glucose   111 H   (74-99)  mg/dL


 


POC Glucose (mg/dL)  125 H    (75-99)  mg/dL


 


Calcium   8.2 L   (8.4-10.2)  mg/dL


 


Total Bilirubin   2.0 H   (0.2-1.3)  mg/dL


 


AST   95 H   (14-36)  U/L


 


Albumin   3.1 L   (3.5-5.0)  g/dL














  12/07/19 Range/Units





  12:06 


 


WBC   (3.8-10.6)  k/uL


 


Hgb   (11.4-16.0)  gm/dL


 


MCV   (80.0-100.0)  fL


 


MCH   (25.0-35.0)  pg


 


MCHC   (31.0-37.0)  g/dL


 


RDW   (11.5-15.5)  %


 


Plt Count   (150-450)  k/uL


 


Neutrophils #   (1.3-7.7)  k/uL


 


Sodium   (137-145)  mmol/L


 


BUN   (7-17)  mg/dL


 


Creatinine   (0.52-1.04)  mg/dL


 


Glucose   (74-99)  mg/dL


 


POC Glucose (mg/dL)  172 H  (75-99)  mg/dL


 


Calcium   (8.4-10.2)  mg/dL


 


Total Bilirubin   (0.2-1.3)  mg/dL


 


AST   (14-36)  U/L


 


Albumin   (3.5-5.0)  g/dL








                      Microbiology - Last 24 Hours (Table)











 12/06/19 12:01 Gram Stain - Preliminary





 Sputum Sputum Culture - Preliminary


 


 12/06/19 04:45 Blood Culture - Preliminary





 Blood    No Growth after 24 hours














Assessment and Plan


Plan: 








1.  Increased shortness of breath secondary to pneumonia.  Chest x-ray completed

showing bilateral pneumonia increased compared to old exam cardiomegaly and 

pulmonary congestion related to mild heart failure.  Pleural fluid improved 

compared to last exam pulmonary congestion unchanged.  Patient's Rocephin and 

azithromycin.  Sputum culture ordered.  DuoNeb breathing treatments.  Pulmonary 

service is consulted.  Repeat chest x-ray ordered





2.  Atrial fibrillation with RVR.  Cardizem drip has been DC'd.  Cardiology 

services have been consulted.  Patient does have known history of atrial 

fibrillation.  Heart rate controlled.





3.  Elevated d-dimer.  D-dimer elevated at 4.74.  Chest CTA completed showing no

evidence of pulmonary embolism.  Bilateral airspace patchy consolidation con

sistent with pneumonia.  Small right pleural effusion changes of pulmonary 

hypertension with enlargement of the pulmonary arteries.  Cardiomegaly.





4.  Abnormal troponin.  Troponin 0.044.  Cardiology services have been consulted





5.  Chronic diastolic congestive heart failure.  2-D echo completed in August 2019 showing EF of 50-55%.  BNP 6740. Patient started on IV Lasix 40 mg every 

12.  Cardiology services following





6.  History of paroxysmal atrial fibrillation.  During previous admission in [facility]

[facility] anticoagulation was discussed with patient and family and was decided to 

keep patient on aspirin 325 due to problems of unsteady gait and risk of falling

and episodes of anemia with refusal of GI evaluation.  





7.  History of essential hypertension





8.  History of diabetes mellitus.  Patient's home metformin dose currently on 

hold.  Sliding scale insulin coverage added





9.  History of hyperlipidemia.  Patient maintained on Lipitor





10.  Acute kidney injury, due to IV Lasix and contrast, at this time patient was

started on IV normal saline at 50 mL an hour we will monitor closely





GI prophylaxis Pepcid.  DVT prophylaxis Lovenox


Pulmonary and cardiology services consulted


Continue IV antibiotic


Lasix was discontinued and patient was started on normal saline at 50 mL an hour

due to elevated BUN and creatinine and evidence of hypotension


Repeat chest x-ray and sputum ordered

## 2019-12-07 NOTE — P.NPCON
History of Present Illness





- Reason for Consult


Consult date: 12/07/19


acute renal failure





- Chief Complaint


Acute kidney injury.





- History of Present Illness


85-year-old lady admitted to the hospital with the above complaints.  She cannot

speak English most of the history is apparent from the chart and the daughter at

bedside.  She was recently hospitalized in August with diastolic CHF and 

discharged home on valsartan and Lasix 40 mg by mouth daily.  Her baseline 

creatinine is 0.9 MG per DL in April 2019 and recently been around 1.2-1.4 MG 

per DL.  No NSAID use.  Her admission creatinine was 1.2 MG per DL and creep to 

2.7 MG per DL today.  And nephrology was consulted.


She got contrast with 80 mL of dye for CT PA.  She was also in A. fib with RVR 

and on Cardizem drip.  At this systolic blood pressure dropped to 60-70 systolic

currently around low 90s systolic.  No nausea vomiting diarrhea.  And she was 

also decreased oral intake.








Review of Systems


Constitutional: Reports as per HPI





Past Medical History


Past Medical History: Heart Failure, Diabetes Mellitus, Hyperlipidemia, 

Hypertension


Additional Past Medical History / Comment(s): cardiomegaly


History of Any Multi-Drug Resistant Organisms: None Reported


Additional Past Surgical History / Comment(s): eye surgery


Past Psychological History: No Psychological Hx Reported


Smoking Status: Former smoker


Past Alcohol Use History: None Reported


Past Drug Use History: None Reported





Medications and Allergies


                                Home Medications











 Medication  Instructions  Recorded  Confirmed  Type


 


ALPRAZolam [Xanax] 0.5 mg PO HS PRN 09/04/18 12/06/19 History


 


Atorvastatin [Lipitor] 10 mg PO DAILY 09/04/18 12/06/19 History


 


Ferrous Sulfate [Iron (65  mg PO DAILY 09/04/18 12/06/19 History





Elemental)]    


 


metFORMIN HCL [Glucophage] 500 mg PO BID-W/MEALS 09/04/18 12/06/19 History


 


Bimatoprost [Lumigan .01% Ophth 1 drop BOTH EYES HS 08/12/19 12/06/19 History





Soln]    


 


Multivitamins, Thera [Multivitamin 1 tab PO DAILY 08/12/19 12/06/19 History





(formulary)]    


 


Aspirin 325 mg PO DAILY 30 Days #30 tab 08/15/19 12/06/19 Rx


 


Furosemide [Lasix] 40 mg PO DAILY 30 Days #30 tab 08/15/19 12/06/19 Rx


 


Valsartan [Diovan] 40 mg PO AC-SUPPER 30 Days #30 tab 08/15/19 12/06/19 Rx


 


Dorzolamide-Timol 2.23%/0.68% 1 drop BOTH EYES BID 12/06/19 12/06/19 History





[Cosopt]    








                                    Allergies











Allergy/AdvReac Type Severity Reaction Status Date / Time


 


No Known Allergies Allergy   Verified 12/06/19 07:01














Physical Exam


Vitals: 


                                   Vital Signs











  Temp Pulse Pulse Resp BP Pulse Ox


 


 12/07/19 13:22   80    


 


 12/07/19 13:09   82    


 


 12/07/19 11:23  97.4 F L   93  18  89/58  94 L


 


 12/07/19 08:54   84    


 


 12/07/19 08:40   84    


 


 12/07/19 08:00  97.5 F L   83  18  86/54  96


 


 12/07/19 03:57  98 F   72  18  87/55 


 


 12/07/19 03:03    83  18  


 


 12/06/19 23:23    83  18  83/52 


 


 12/06/19 23:20    100  18  


 


 12/06/19 21:06   88    


 


 12/06/19 20:56   88    


 


 12/06/19 20:21      82/51 


 


 12/06/19 20:12      80/50 


 


 12/06/19 20:01      76/53 


 


 12/06/19 20:00  98 F   100  18  71/45  98


 


 12/06/19 19:56      75/51 


 


 12/06/19 16:00  98 F   86  18  90/50  97


 


 12/06/19 15:55   88   16  


 


 12/06/19 15:42   86   16  








                                Intake and Output











 12/06/19 12/07/19 12/07/19





 22:59 06:59 14:59


 


Intake Total 50  118


 


Balance 50  118


 


Intake:   


 


  Oral 50  118


 


Other:   


 


  Voiding Method   Toilet


 


  # Voids  2 


 


  Weight  42.3 kg 











No acute distress


S1-S2 heard


Decreased breath sounds in bases


Abdomen soft


No edema








Results





- Lab Results


                             Most recent lab results











Calcium  8.2 mg/dL (8.4-10.2)  L  12/07/19  06:20    


 


Magnesium  1.6 mg/dL (1.6-2.3)   12/06/19  04:27    














                                 12/07/19 06:20





                                 12/07/19 06:20





Assessment and Plan


Assessment: 


#1 acute kidney injury multifactorial


-Ischemic ATN with low blood pressure and valsartan.


-Toxic ATN with recent contrast


-Prerenal process with decreased oral intake/Lasix use.


#2 diastolic CHF


#3 pneumonia


#4 atrial fibrillation


#5 hypotension


#6 mild hyponatremia.


#7 diabetes


#8 CK D stage III secondary to suspected nephrosclerosis and diabetic 

nephropathy with a baseline creatinine of 1.2-1.4 MG per DL.





Plan: 


#1 continue with IV fluids normal saline, increase the rate to 75 ML's an hour.


#2 check urine analysis, postvoid residual.  Straight cath if postvoid more than

 250 ML's.


#3 renal ultrasound for size


#4 avoid nephrotoxic agents and hypotensive episodes.  Agree with stopping Lasix

 and valsartan.


#5 labs in the morning.

## 2019-12-08 LAB
ALBUMIN SERPL-MCNC: 3.2 G/DL (ref 3.5–5)
ALP SERPL-CCNC: 66 U/L (ref 38–126)
ALT SERPL-CCNC: 44 U/L (ref 9–52)
ANION GAP SERPL CALC-SCNC: 14 MMOL/L
AST SERPL-CCNC: 114 U/L (ref 14–36)
BASOPHILS # BLD AUTO: 0 K/UL (ref 0–0.2)
BASOPHILS NFR BLD AUTO: 0 %
BUN SERPL-SCNC: 56 MG/DL (ref 7–17)
CALCIUM SPEC-MCNC: 8.1 MG/DL (ref 8.4–10.2)
CHLORIDE SERPL-SCNC: 100 MMOL/L (ref 98–107)
CO2 SERPL-SCNC: 19 MMOL/L (ref 22–30)
EOSINOPHIL # BLD AUTO: 0.1 K/UL (ref 0–0.7)
EOSINOPHIL NFR BLD AUTO: 1 %
ERYTHROCYTE [DISTWIDTH] IN BLOOD BY AUTOMATED COUNT: 4.7 M/UL (ref 3.8–5.4)
ERYTHROCYTE [DISTWIDTH] IN BLOOD: 15.9 % (ref 11.5–15.5)
GLUCOSE BLD-MCNC: 131 MG/DL (ref 75–99)
GLUCOSE BLD-MCNC: 145 MG/DL (ref 75–99)
GLUCOSE BLD-MCNC: 192 MG/DL (ref 75–99)
GLUCOSE BLD-MCNC: 99 MG/DL (ref 75–99)
GLUCOSE SERPL-MCNC: 141 MG/DL (ref 74–99)
HCT VFR BLD AUTO: 36 % (ref 34–46)
HGB BLD-MCNC: 10.8 GM/DL (ref 11.4–16)
LYMPHOCYTES # SPEC AUTO: 1.4 K/UL (ref 1–4.8)
LYMPHOCYTES NFR SPEC AUTO: 10 %
MCH RBC QN AUTO: 23 PG (ref 25–35)
MCHC RBC AUTO-ENTMCNC: 30 G/DL (ref 31–37)
MCV RBC AUTO: 76.7 FL (ref 80–100)
MONOCYTES # BLD AUTO: 0.3 K/UL (ref 0–1)
MONOCYTES NFR BLD AUTO: 2 %
NEUTROPHILS # BLD AUTO: 12.5 K/UL (ref 1.3–7.7)
NEUTROPHILS NFR BLD AUTO: 87 %
PH UR: 5 [PH] (ref 5–8)
PLATELET # BLD AUTO: 125 K/UL (ref 150–450)
POTASSIUM SERPL-SCNC: 5 MMOL/L (ref 3.5–5.1)
PROT SERPL-MCNC: 6.7 G/DL (ref 6.3–8.2)
SODIUM SERPL-SCNC: 133 MMOL/L (ref 137–145)
SP GR UR: 1.03 (ref 1–1.03)
URATE SERPL-MCNC: 10.4 MG/DL (ref 3.7–7.4)
UROBILINOGEN UR QL STRIP: <2 MG/DL (ref ?–2)
WBC # BLD AUTO: 14.5 K/UL (ref 3.8–10.6)

## 2019-12-08 RX ADMIN — DORZOLAMIDE HYDROCHLORIDE AND TIMOLOL MALEATE SCH DROPS: 20; 5 SOLUTION/ DROPS OPHTHALMIC at 20:29

## 2019-12-08 RX ADMIN — IPRATROPIUM BROMIDE AND ALBUTEROL SULFATE SCH ML: .5; 3 SOLUTION RESPIRATORY (INHALATION) at 09:02

## 2019-12-08 RX ADMIN — LATANOPROST SCH DROPS: 50 SOLUTION OPHTHALMIC at 20:30

## 2019-12-08 RX ADMIN — IPRATROPIUM BROMIDE AND ALBUTEROL SULFATE SCH ML: .5; 3 SOLUTION RESPIRATORY (INHALATION) at 11:53

## 2019-12-08 RX ADMIN — FAMOTIDINE SCH MG: 20 TABLET, FILM COATED ORAL at 10:07

## 2019-12-08 RX ADMIN — SODIUM CHLORIDE, PRESERVATIVE FREE SCH: 5 INJECTION INTRAVENOUS at 20:30

## 2019-12-08 RX ADMIN — AZITHROMYCIN SCH MG: 500 TABLET, FILM COATED ORAL at 10:07

## 2019-12-08 RX ADMIN — IPRATROPIUM BROMIDE AND ALBUTEROL SULFATE SCH ML: .5; 3 SOLUTION RESPIRATORY (INHALATION) at 19:50

## 2019-12-08 RX ADMIN — ACETAMINOPHEN PRN MG: 500 TABLET ORAL at 12:24

## 2019-12-08 RX ADMIN — INSULIN ASPART SCH UNIT: 100 INJECTION, SOLUTION INTRAVENOUS; SUBCUTANEOUS at 17:56

## 2019-12-08 RX ADMIN — ATORVASTATIN CALCIUM SCH MG: 10 TABLET, FILM COATED ORAL at 10:07

## 2019-12-08 RX ADMIN — ASPIRIN 81 MG CHEWABLE TABLET SCH MG: 81 TABLET CHEWABLE at 10:10

## 2019-12-08 RX ADMIN — INSULIN ASPART SCH: 100 INJECTION, SOLUTION INTRAVENOUS; SUBCUTANEOUS at 20:59

## 2019-12-08 RX ADMIN — INSULIN ASPART SCH: 100 INJECTION, SOLUTION INTRAVENOUS; SUBCUTANEOUS at 06:30

## 2019-12-08 RX ADMIN — CEFAZOLIN SCH: 330 INJECTION, POWDER, FOR SOLUTION INTRAMUSCULAR; INTRAVENOUS at 10:10

## 2019-12-08 RX ADMIN — Medication SCH MG: at 10:07

## 2019-12-08 RX ADMIN — THERA TABS SCH EACH: TAB at 10:07

## 2019-12-08 RX ADMIN — CEFAZOLIN SCH MLS/HR: 330 INJECTION, POWDER, FOR SOLUTION INTRAMUSCULAR; INTRAVENOUS at 22:53

## 2019-12-08 RX ADMIN — IPRATROPIUM BROMIDE AND ALBUTEROL SULFATE SCH ML: .5; 3 SOLUTION RESPIRATORY (INHALATION) at 16:42

## 2019-12-08 RX ADMIN — INSULIN ASPART SCH UNIT: 100 INJECTION, SOLUTION INTRAVENOUS; SUBCUTANEOUS at 12:20

## 2019-12-08 RX ADMIN — DORZOLAMIDE HYDROCHLORIDE AND TIMOLOL MALEATE SCH: 20; 5 SOLUTION/ DROPS OPHTHALMIC at 10:11

## 2019-12-08 RX ADMIN — SODIUM CHLORIDE, PRESERVATIVE FREE SCH: 5 INJECTION INTRAVENOUS at 10:07

## 2019-12-08 RX ADMIN — ENOXAPARIN SODIUM SCH MG: 40 INJECTION SUBCUTANEOUS at 06:02

## 2019-12-08 NOTE — P.PN
Subjective


Progress Note Date: 12/08/19


Seen and examined for the follow-up of acute kidney injury.  Daughter at 

bedside.  Blood pressures stable.  No proper documentation of urine output.  But

as per the daughter she is making good urine.  Urine in the bathroom appears 

dark.








Objective





- Vital Signs


Vital signs: 


                                   Vital Signs











Temp  97.5 F L  12/08/19 03:38


 


Pulse  76   12/08/19 09:11


 


Resp  18   12/08/19 03:38


 


BP  127/78   12/08/19 03:38


 


Pulse Ox  98   12/08/19 03:38








                                 Intake & Output











 12/07/19 12/08/19 12/08/19





 18:59 06:59 18:59


 


Intake Total 236 150 


 


Balance 236 150 


 


Weight 42.3 kg 98.1 kg 


 


Intake:   


 


  Intake, IV Titration  150 





  Amount   


 


    Sodium Chloride 0.9% 1,  150 





    000 ml @ 75 mls/hr IV .   





    [id number] [facility] Rx#:[id number]   


 


  Oral 236  


 


Other:   


 


  Voiding Method Toilet  


 


  # Voids 2 1 














- Exam


No acute distress


S1-S2 heard


Lungs clear


No edema








- Labs


CBC & Chem 7: 


                                 12/08/19 06:09





                                 12/08/19 06:09


Labs: 


                  Abnormal Lab Results - Last 24 Hours (Table)











  12/07/19 12/07/19 12/08/19 Range/Units





  12:06 20:07 06:09 


 


WBC    14.5 H  (3.8-10.6)  k/uL


 


Hgb    10.8 L  (11.4-16.0)  gm/dL


 


MCV    76.7 L  (80.0-100.0)  fL


 


MCH    23.0 L  (25.0-35.0)  pg


 


MCHC    30.0 L  (31.0-37.0)  g/dL


 


RDW    15.9 H  (11.5-15.5)  %


 


Plt Count    125 L  (150-450)  k/uL


 


Neutrophils #    12.5 H  (1.3-7.7)  k/uL


 


Sodium     (137-145)  mmol/L


 


Carbon Dioxide     (22-30)  mmol/L


 


BUN     (7-17)  mg/dL


 


Creatinine     (0.52-1.04)  mg/dL


 


Glucose     (74-99)  mg/dL


 


POC Glucose (mg/dL)  172 H  145 H   (75-99)  mg/dL


 


Uric Acid     (3.7-7.4)  mg/dL


 


Calcium     (8.4-10.2)  mg/dL


 


Total Bilirubin     (0.2-1.3)  mg/dL


 


AST     (14-36)  U/L


 


Albumin     (3.5-5.0)  g/dL














  12/08/19 12/08/19 Range/Units





  06:09 06:29 


 


WBC    (3.8-10.6)  k/uL


 


Hgb    (11.4-16.0)  gm/dL


 


MCV    (80.0-100.0)  fL


 


MCH    (25.0-35.0)  pg


 


MCHC    (31.0-37.0)  g/dL


 


RDW    (11.5-15.5)  %


 


Plt Count    (150-450)  k/uL


 


Neutrophils #    (1.3-7.7)  k/uL


 


Sodium  133 L   (137-145)  mmol/L


 


Carbon Dioxide  19 L   (22-30)  mmol/L


 


BUN  56 H   (7-17)  mg/dL


 


Creatinine  3.16 H   (0.52-1.04)  mg/dL


 


Glucose  141 H   (74-99)  mg/dL


 


POC Glucose (mg/dL)   145 H  (75-99)  mg/dL


 


Uric Acid  10.4 H   (3.7-7.4)  mg/dL


 


Calcium  8.1 L   (8.4-10.2)  mg/dL


 


Total Bilirubin  2.1 H   (0.2-1.3)  mg/dL


 


AST  114 H   (14-36)  U/L


 


Albumin  3.2 L   (3.5-5.0)  g/dL








                      Microbiology - Last 24 Hours (Table)











 12/06/19 04:45 Blood Culture - Preliminary





 Blood    No Growth after 48 hours


 


 12/06/19 12:01 Gram Stain - Preliminary





 Sputum Sputum Culture - Preliminary














Assessment and Plan


Assessment: 


#1 acute kidney injury multifactorial


-Ischemic ATN with low blood pressure and valsartan.


-Toxic ATN with recent contrast


-Prerenal process with decreased oral intake/Lasix use.


#2 diastolic CHF


#3 pneumonia


#4 atrial fibrillation


#5 hypotension


#6 mild hyponatremia.


#7 diabetes


#8 CK D stage III secondary to suspected nephrosclerosis and diabetic nephropat

hy with a baseline creatinine of 1.2-1.4 MG per DL.





Plan: 


#1 continue with IV fluids normal saline at 75 ML's an hour.  Creatinine 

plateauing..


#2 check urine analysis, postvoid residual.  Straight cath if postvoid more than

250 ML's.


#3 renal ultrasound noted no hydronephrosis.


#4 avoid nephrotoxic agents and hypotensive episodes.  Agree with stopping Lasix

and valsartan.


#5 labs in the morning.

## 2019-12-08 NOTE — XR
EXAMINATION TYPE: XR chest 1V portable

 

DATE OF EXAM: 12/8/2019

 

HISTORY: chf/pneumonia.

 

REFERENCE: Previous study dated 12/7/2019.

 

FINDINGS: There is multichamber cardiac enlargement. There is prominence of both maria elena. There is impro
jr aeration of the right lung. There is mild blunting of both CP angles. I could not exclude small e
ffusions.

 

IMPRESSION: 

1. CARDIOMEGALY.

2. PROMINENT MARIA ELENA BILATERALLY SUGGESTIVE OF PULMONARY ARTERY HYPERTENSION.

3. I COULD NOT EXCLUDE SMALL, BILATERAL.

## 2019-12-08 NOTE — P.PN
Subjective


Progress Note Date: 12/08/19


Principal diagnosis: 


 Acute hypoxic respiratory failure related to acute pneumonia, involving 

bilateral lungs and acute exacerbation of chronic diastolic heart failure





This is a 85-year-old [place] patient of Dr. Ramos, with past medical history of 

congestive heart failure with diastolic dysfunction, chronic atrial fibrillation

not on anticoagulation related to frequent falls, hypertension, diabetes 

mellitus type 2, hyperlipidemia, who came into the hospital early this morning o

n 12/06/2019 for complaints of increased shortness of breath, cough, congestion,

phlegm production with mild hemoptysis, fever.  Patient's symptoms started last 

week after Thanksgiving family gathering.  Became progressively worse, last 

night patient was dry heaving, but no vomiting, had a single episode of 

diarrhea.  Denied any increased swelling in her legs, denied chest pain, but she

is experiencing discomfort in the posterior rib cage to her scapula with deep 

breathing and coughing, chest x-ray on admission showed pulmonary vessel 

congestion and bilateral pulmonary airspace infiltrates consistent with 

pneumonia.  Admission lab work showed white blood cell count of 10.4, hemoglobin

of 10.9, d-dimer was elevated at 4.74 however CT chest did not show evidence of 

pulmonary embolism, it did show bilateral airspace patchy consolidation 

consistent with pneumonia and small right pleural effusion and changes of 

pulmonary hypertension with enlargement of the pulmonary arteries.  EKG showed a

trial fibrillation with rapid ventricular response with a rate of 116 BPM.  

Patient was significantly short of breath on admission, and hypoxemic requiring 

100% nonrebreather, she was given a dose of IV Lasix started on empiric AmBisome

the form of Zithromax and Rocephin for possibility of community acquired 

pneumonia, and her FiO2 has since been weaned down to 2 L, her pulse ox is 95%, 

she is still tachycardic, and she was given Cardizem infusion for rate control. 

She has a very congested  cough, she has been started on breathing treatments, 

and were consulted for evaluation of shortness of breath related to bilateral 

pneumonia and possibility of congestive heart failure





On 12/07/2019 patient seen in follow-up on selective care unit, yesterday 

patient developed hypotension, with systolic as low as 60 mmHg, and her doses of

afternoon Lasix and her morning dose of Lasix had been held.  Patient was 

increasingly more lethargic, not eating much, but has not had a fever, she is on

room air, with a pulse ox of 94%.  She is on combination of Rocephin and 

Zithromax for antibiotic coverage, blood culture and sputum culture so far are 

pending, preliminary sputum culture shows few gram-positive bacilli, fungal 

cultures pending.  Clinically patient appears to be better today, she is more a

wake on today's exam, she has not had any further nausea or vomiting, her back 

pain over right upper back is improving.  Patient's family brought food from 

home, and patient is attempting to eat.  Blood pressure today is 89/58, with a 

mean of 68, room air pulse ox is 94%, no fever, lung sounds reveal diminished 

breath sounds with bibasilar crackles, no significant rhonchi, patient still has

very congested cough and bringing up much sputum today.  No complaints of chest 

pain, no lower extremity swelling, today's labs have been reviewed showing white

blood cell count 18.4, hemoglobin is 10.7, serum sodium is 134, the rest of the 

electrolytes were within normal limits, BUN is 38 and creatinine is 2.73 and 

there has been worsening of her renal function since admission.





On 12/08/2019 patient seen in follow-up.  She is awake, she is answering 

questions with the help of her daughter.  She denies any distress, denies any 

shortness of breath, occasional cough, no phlegm production, no chest or back 

pain.  No chills, appetite improving, remains on IV hydration, with 0.9 normal 

saline at a rate of 75 ML per hour.  Nephrology is following, BUN is 56 and 

creatinine is 3.16 on today's labs.  White blood cell count is 14.5, improving 

leukocytosis, hemoglobin is 10.8.  She is on combination of Rocephin and 

Zithromax.  She is voiding, but her urine is dark.  Renal ultrasound showed no 

hydronephrosis.  Blood and sputum cultures have shown no growth, she is on room 

air pulse ox 98%, afebrile.  Lung sounds reveal crackles at the bases, left 

greater than right.  Clinically improving, she is getting very to get up and get

a shower








Objective





- Vital Signs


Vital signs: 


                                   Vital Signs











Temp  97.5 F L  12/08/19 03:38


 


Pulse  72   12/08/19 12:00


 


Resp  18   12/08/19 03:38


 


BP  127/78   12/08/19 03:38


 


Pulse Ox  98   12/08/19 03:38








                                 Intake & Output











 12/07/19 12/08/19 12/08/19





 18:59 06:59 18:59


 


Intake Total 236 150 358


 


Balance 236 150 358


 


Weight 42.3 kg 98.1 kg 


 


Intake:   


 


  Intake, IV Titration  150 





  Amount   


 


    Sodium Chloride 0.9% 1,  150 





    000 ml @ 75 mls/hr IV .   





    [id number] [facility] Rx#:[id number]   


 


  Oral 236  358


 


Other:   


 


  Voiding Method Toilet  


 


  # Voids 2 1 3














- Exam


GENERAL EXAM: Alert, very pleasant, 85-year-old Laotian female, with limited 

English, patient's daughter is at the bedside and translating for the patient.  

Patient appears to be more awake and responsive on today's exam, she is sitting 

up in bed, and is getting very to get a shower


HEAD: Normocephalic/atraumatic.


EYES: Normal reaction of pupils, equal size.  Conjunctiva pink, sclera white.


NOSE: Clear with pink turbinates.


THROAT: No erythema or exudates.


NECK: No masses, no JVD, no thyroid enlargement, no adenopathy.


CHEST: No chest wall deformity.  Symmetrical expansion. 


LUNGS: Equal air entry with basilar crackles left greater than right


CVS: Regular rate and rhythm, normal S1 and S2, no gallops, no murmurs, no rubs


ABDOMEN: Soft, nontender.  No hepatosplenomegaly, normal bowel sounds, no 

guarding or rigidity.


EXTREMITIES: No clubbing, no edema, no cyanosis, 2+ pulses and upper and lower 

extremities.


MUSCULOSKELETAL: Muscle strength and tone normal.


SPINE: No scoliosis or deformity


SKIN: No rashes


CENTRAL NERVOUS SYSTEM: Alert and oriented -3.  No focal deficits, tone is 

normal in all 4 extremities.


PSYCHIATRIC: Alert and oriented -3.  Appropriate affect.  Intact judgment and 

insight








- Labs


CBC & Chem 7: 


                                 12/08/19 06:09





                                 12/08/19 06:09


Labs: 


                  Abnormal Lab Results - Last 24 Hours (Table)











  12/07/19 12/08/19 12/08/19 Range/Units





  20:07 06:09 06:09 


 


WBC   14.5 H   (3.8-10.6)  k/uL


 


Hgb   10.8 L   (11.4-16.0)  gm/dL


 


MCV   76.7 L   (80.0-100.0)  fL


 


MCH   23.0 L   (25.0-35.0)  pg


 


MCHC   30.0 L   (31.0-37.0)  g/dL


 


RDW   15.9 H   (11.5-15.5)  %


 


Plt Count   125 L   (150-450)  k/uL


 


Neutrophils #   12.5 H   (1.3-7.7)  k/uL


 


Sodium    133 L  (137-145)  mmol/L


 


Carbon Dioxide    19 L  (22-30)  mmol/L


 


BUN    56 H  (7-17)  mg/dL


 


Creatinine    3.16 H  (0.52-1.04)  mg/dL


 


Glucose    141 H  (74-99)  mg/dL


 


POC Glucose (mg/dL)  145 H    (75-99)  mg/dL


 


Uric Acid    10.4 H  (3.7-7.4)  mg/dL


 


Calcium    8.1 L  (8.4-10.2)  mg/dL


 


Total Bilirubin    2.1 H  (0.2-1.3)  mg/dL


 


AST    114 H  (14-36)  U/L


 


Albumin    3.2 L  (3.5-5.0)  g/dL














  12/08/19 12/08/19 Range/Units





  06:29 11:53 


 


WBC    (3.8-10.6)  k/uL


 


Hgb    (11.4-16.0)  gm/dL


 


MCV    (80.0-100.0)  fL


 


MCH    (25.0-35.0)  pg


 


MCHC    (31.0-37.0)  g/dL


 


RDW    (11.5-15.5)  %


 


Plt Count    (150-450)  k/uL


 


Neutrophils #    (1.3-7.7)  k/uL


 


Sodium    (137-145)  mmol/L


 


Carbon Dioxide    (22-30)  mmol/L


 


BUN    (7-17)  mg/dL


 


Creatinine    (0.52-1.04)  mg/dL


 


Glucose    (74-99)  mg/dL


 


POC Glucose (mg/dL)  145 H  131 H  (75-99)  mg/dL


 


Uric Acid    (3.7-7.4)  mg/dL


 


Calcium    (8.4-10.2)  mg/dL


 


Total Bilirubin    (0.2-1.3)  mg/dL


 


AST    (14-36)  U/L


 


Albumin    (3.5-5.0)  g/dL








                      Microbiology - Last 24 Hours (Table)











 12/06/19 12:01 Gram Stain - Final





 Sputum Sputum Culture - Final


 


 12/06/19 04:45 Blood Culture - Preliminary





 Blood    No Growth after 48 hours














Assessment and Plan


Plan: 


 Assessment:





#1.  Acute hypoxic respiratory failure related to acute pneumonia, involving 

bilateral lungs, chest x-ray and CTA chest showed bilateral airspace patchy 

consolidation and small right pleural effusion





#2.  Acute exacerbation of chronic diastolic heart failure related to the above





#3.  Acute kidney injury related to ATN, dehydration, diuretic therapy





#4.  Chronic atrial fibrillation, not on any chronic anticoagulation related to 

history of falls





#5.  Atrial fibrillation with RVR





#6.  Elevated d-dimer, but no evidence of pulmonary embolism on CT angios chest





#7.  Elevated troponin likely related to hypoxemia and acute exacerbation of CHF





#8.  Chronic congestive heart failure





#9.  History of hypertension





#10.  History of diabetes mellitus





#11.  History of hyperlipidemia





#12.  No history of smoking, but patient does use chewing tobacco





Plan:





Continue current antibiotics, blood and sputum cultures have been negative, 

clinically patient is improving, continue IV hydration, nephrology is following,

this slightly worsening renal profile, the patient is producing adequate amount 

of urine although it is dark in color.  No nausea vomiting no diarrhea, she is 

tolerating oral intake, shortness of breath, no fever or chills, encouraged the 

patient to sit up in the chair, vital signs are stable, blood pressure is 

stable, will continue to follow


I performed a history & physical examination of the patient and discussed their 

management with my nurse practitioner, Delicia Knowles.  I reviewed the nurse 

practitioner's note and agree with the documented findings and plan of care.  

Lung sounds are positive for crackles at bilateral bases, and right mid and 

upper lobe.  The findings and the impression was discussed with the patient.  I 

attest to the documentation by the nurse practitioner. 





Time with Patient: Less than 30

## 2019-12-08 NOTE — P.PN
Subjective


Progress Note Date: 12/08/19








This is an 85-year-old female patient who presented with complaints of shortness

of breath cough and fatigue.  Patient's daughter is at bedside.  Patient speaks 

very little English.  Per patient's daughter she's been having increasing 

shortness of breath and cough over the past few days.  Patient also reports that

she's been having increased fatigue.  Patient has been coughing but unable to 

cough anything up.  Per patient daughter denies fever or chills.  Patient does 

have a past medical history of atrial fibrillation in which she is not 

maintained on anticoagulation just aspirin, congestive heart failure diabetes 

mellitus, hyperlipidemia, hypertension and chewing tobacco use.  Patient d-dimer

elevated at 4.74.  Chest CTA was completed showing no evidence of pulmonary 

embolism.  Bilateral airspace patchy consolidation consistent with pneumonia.  

Small right pleural effusion.  Changes of pulmonary hypertension with 

enlargement of the pulmonary arteries.  Cardiomegaly.  His x-ray completed sh

owing bilateral pneumonia increase compared to old exam cardiomegaly and 

pulmonary congestion related to mild heart failure.  Pleural effusion fluid 

improved compared to last exam pulmonary congestion unchanged.  EKG completed 

showing atrial fibrillation with rapid ventricular response right axis deviation

and complete right bundle branch block.  BNP 6740.  Cardiology services have 

been consulted.  Pulmonary services are consulted.  Patient started on Lovenox 

and Rocephin for antibiotic sputum culture has been ordered.  At this time 

patient is resting comfortably in bed.  Daughter at bedside.  Patient stopping 

some shortness of breath with cough.  Patient denies chest pain.  Patient denies

nausea vomiting or diarrhea.  Patient denies any urinary burning or frequency.








On 12/07/2019 Patient was seen and examined, on the telemetry floor she is alert

and oriented 3 in no apparent distress, she is reporting improvement in her 

shortness of breath, there is no chest pain no fever or chills no headache or 

dizziness no chest pain no nausea or vomiting no abdominal pain no diarrhea and 

no urinary symptoms, there is worsening in her laboratory data was elevated 

white blood count to 18.4 and elevated BUN at 38 and elevated creatinine at 2.73





On 12/08/2019 patient was seen and examined on the telemetry floor, she is alert

and responsive in no apparent distress she is answering questions appropriately 

through her daughter who is translating, there is no fever or chills no headache

or dizziness no chest pain no shortness of breath no cough no nausea or vomiting

no abdominal pain no diarrhea no burning with urination no frequency or urgency 

no hematuria, kidney function worsened today with creatinine up to 3.13 IV fluid

was increased up to 75 mL an hour nephrology consult requested





Objective





- Vital Signs


Vital signs: 


                                   Vital Signs











Temp  97.5 F L  12/08/19 03:38


 


Pulse  76   12/08/19 09:11


 


Resp  18   12/08/19 03:38


 


BP  127/78   12/08/19 03:38


 


Pulse Ox  98   12/08/19 03:38








                                 Intake & Output











 12/07/19 12/08/19 12/08/19





 18:59 06:59 18:59


 


Intake Total 236 150 


 


Balance 236 150 


 


Weight 42.3 kg 98.1 kg 


 


Intake:   


 


  Intake, IV Titration  150 





  Amount   


 


    Sodium Chloride 0.9% 1,  150 





    000 ml @ 75 mls/hr IV .   





    [id number] [facility][id number]   


 


  Oral 236  


 


Other:   


 


  Voiding Method Toilet  


 


  # Voids 2 1 














- Exam





In general patient is alert and oriented 3 daughter is translating questions 

and answers in no apparent distress


Head normocephalic and atraumatic


Neck supple no JVD no goiter


Lungs diminished bilaterally, no crackles no wheezing


Heart irregular rate no gallops no murmurs


Abdomen is soft nontender nondistended positive bowel sounds no 

hepatosplenomegaly


Extremities no edema no cyanosis or clubbing


Neuro no gross focal deficits








- Labs


CBC & Chem 7: 


                                 12/08/19 06:09





                                 12/08/19 06:09


Labs: 


                  Abnormal Lab Results - Last 24 Hours (Table)











  12/07/19 12/07/19 12/08/19 Range/Units





  12:06 20:07 06:09 


 


WBC    14.5 H  (3.8-10.6)  k/uL


 


Hgb    10.8 L  (11.4-16.0)  gm/dL


 


MCV    76.7 L  (80.0-100.0)  fL


 


MCH    23.0 L  (25.0-35.0)  pg


 


MCHC    30.0 L  (31.0-37.0)  g/dL


 


RDW    15.9 H  (11.5-15.5)  %


 


Plt Count    125 L  (150-450)  k/uL


 


Neutrophils #    12.5 H  (1.3-7.7)  k/uL


 


Sodium     (137-145)  mmol/L


 


Carbon Dioxide     (22-30)  mmol/L


 


BUN     (7-17)  mg/dL


 


Creatinine     (0.52-1.04)  mg/dL


 


Glucose     (74-99)  mg/dL


 


POC Glucose (mg/dL)  172 H  145 H   (75-99)  mg/dL


 


Uric Acid     (3.7-7.4)  mg/dL


 


Calcium     (8.4-10.2)  mg/dL


 


Total Bilirubin     (0.2-1.3)  mg/dL


 


AST     (14-36)  U/L


 


Albumin     (3.5-5.0)  g/dL














  12/08/19 12/08/19 Range/Units





  06:09 06:29 


 


WBC    (3.8-10.6)  k/uL


 


Hgb    (11.4-16.0)  gm/dL


 


MCV    (80.0-100.0)  fL


 


MCH    (25.0-35.0)  pg


 


MCHC    (31.0-37.0)  g/dL


 


RDW    (11.5-15.5)  %


 


Plt Count    (150-450)  k/uL


 


Neutrophils #    (1.3-7.7)  k/uL


 


Sodium  133 L   (137-145)  mmol/L


 


Carbon Dioxide  19 L   (22-30)  mmol/L


 


BUN  56 H   (7-17)  mg/dL


 


Creatinine  3.16 H   (0.52-1.04)  mg/dL


 


Glucose  141 H   (74-99)  mg/dL


 


POC Glucose (mg/dL)   145 H  (75-99)  mg/dL


 


Uric Acid  10.4 H   (3.7-7.4)  mg/dL


 


Calcium  8.1 L   (8.4-10.2)  mg/dL


 


Total Bilirubin  2.1 H   (0.2-1.3)  mg/dL


 


AST  114 H   (14-36)  U/L


 


Albumin  3.2 L   (3.5-5.0)  g/dL








                      Microbiology - Last 24 Hours (Table)











 12/06/19 04:45 Blood Culture - Preliminary





 Blood    No Growth after 48 hours


 


 12/06/19 12:01 Gram Stain - Preliminary





 Sputum Sputum Culture - Preliminary














Assessment and Plan


Plan: 








1.  Increased shortness of breath secondary to pneumonia.  Chest x-ray completed

showing bilateral pneumonia increased compared to old exam cardiomegaly and 

pulmonary congestion related to mild heart failure.  Pleural fluid improved 

compared to last exam pulmonary congestion unchanged.  Patient's Rocephin and a

zithromycin.  Sputum culture ordered.  DuoNeb breathing treatments.  Pulmonary 

service is consulted.  Repeat chest x-ray ordered





2.  Atrial fibrillation with RVR.  Cardizem drip has been DC'd.  Cardiology 

services have been consulted.  Patient does have known history of atrial 

fibrillation.  Heart rate controlled.





3.  Elevated d-dimer.  D-dimer elevated at 4.74.  Chest CTA completed showing no

evidence of pulmonary embolism.  Bilateral airspace patchy consolidation 

consistent with pneumonia.  Small right pleural effusion changes of pulmonary 

hypertension with enlargement of the pulmonary arteries.  Cardiomegaly.





4.  Abnormal troponin.  Troponin 0.044.  Cardiology services have been consulted





5.  Chronic diastolic congestive heart failure.  2-D echo completed in August 2019 showing EF of 50-55%.  BNP 6740. Patient started on IV Lasix 40 mg every 

12.  Cardiology services following





6.  History of paroxysmal atrial fibrillation.  During previous admission in 

August anticoagulation was discussed with patient and family and was decided to 

keep patient on aspirin 325 due to problems of unsteady gait and risk of falling

and episodes of anemia with refusal of GI evaluation.  





7.  History of essential hypertension





8.  History of diabetes mellitus.  Patient's home metformin dose currently on 

hold.  Sliding scale insulin coverage added





9.  History of hyperlipidemia.  Patient maintained on Lipitor





10.  Acute kidney injury, due to IV Lasix and contrast, at this time patient was

started on IV normal saline at 50 mL an hour we will monitor closely





GI prophylaxis Pepcid.  DVT prophylaxis Lovenox


Pulmonary and cardiology services consulted


Continue IV antibiotic


Lasix was discontinued and patient was started on normal saline at 50 mL an hour

due to elevated BUN and creatinine and evidence of hypotension


Repeat chest x-ray and sputum ordered

## 2019-12-09 LAB
ALBUMIN SERPL-MCNC: 3.4 G/DL (ref 3.5–5)
ALP SERPL-CCNC: 66 U/L (ref 38–126)
ALT SERPL-CCNC: 68 U/L (ref 9–52)
ANION GAP SERPL CALC-SCNC: 14 MMOL/L
AST SERPL-CCNC: 197 U/L (ref 14–36)
BASOPHILS # BLD AUTO: 0 K/UL (ref 0–0.2)
BASOPHILS NFR BLD AUTO: 0 %
BUN SERPL-SCNC: 60 MG/DL (ref 7–17)
CALCIUM SPEC-MCNC: 8.4 MG/DL (ref 8.4–10.2)
CHLORIDE SERPL-SCNC: 103 MMOL/L (ref 98–107)
CO2 SERPL-SCNC: 15 MMOL/L (ref 22–30)
EOSINOPHIL # BLD AUTO: 0.1 K/UL (ref 0–0.7)
EOSINOPHIL NFR BLD AUTO: 1 %
ERYTHROCYTE [DISTWIDTH] IN BLOOD BY AUTOMATED COUNT: 5.17 M/UL (ref 3.8–5.4)
ERYTHROCYTE [DISTWIDTH] IN BLOOD: 16.1 % (ref 11.5–15.5)
GLUCOSE BLD-MCNC: 118 MG/DL (ref 75–99)
GLUCOSE BLD-MCNC: 131 MG/DL (ref 75–99)
GLUCOSE BLD-MCNC: 157 MG/DL (ref 75–99)
GLUCOSE BLD-MCNC: 92 MG/DL (ref 75–99)
GLUCOSE SERPL-MCNC: 91 MG/DL (ref 74–99)
HCT VFR BLD AUTO: 39.2 % (ref 34–46)
HGB BLD-MCNC: 11.7 GM/DL (ref 11.4–16)
LYMPHOCYTES # SPEC AUTO: 1.1 K/UL (ref 1–4.8)
LYMPHOCYTES NFR SPEC AUTO: 11 %
MCH RBC QN AUTO: 22.6 PG (ref 25–35)
MCHC RBC AUTO-ENTMCNC: 29.7 G/DL (ref 31–37)
MCV RBC AUTO: 75.9 FL (ref 80–100)
MONOCYTES # BLD AUTO: 0.3 K/UL (ref 0–1)
MONOCYTES NFR BLD AUTO: 3 %
NEUTROPHILS # BLD AUTO: 8.7 K/UL (ref 1.3–7.7)
NEUTROPHILS NFR BLD AUTO: 84 %
PLATELET # BLD AUTO: 101 K/UL (ref 150–450)
POTASSIUM SERPL-SCNC: 5 MMOL/L (ref 3.5–5.1)
PROT SERPL-MCNC: 7.1 G/DL (ref 6.3–8.2)
SODIUM SERPL-SCNC: 132 MMOL/L (ref 137–145)
WBC # BLD AUTO: 10.4 K/UL (ref 3.8–10.6)

## 2019-12-09 RX ADMIN — ASPIRIN 81 MG CHEWABLE TABLET SCH MG: 81 TABLET CHEWABLE at 09:19

## 2019-12-09 RX ADMIN — SODIUM CHLORIDE, PRESERVATIVE FREE SCH ML: 5 INJECTION INTRAVENOUS at 09:19

## 2019-12-09 RX ADMIN — INSULIN ASPART SCH UNIT: 100 INJECTION, SOLUTION INTRAVENOUS; SUBCUTANEOUS at 21:22

## 2019-12-09 RX ADMIN — AZITHROMYCIN SCH MG: 500 TABLET, FILM COATED ORAL at 09:19

## 2019-12-09 RX ADMIN — INSULIN ASPART SCH: 100 INJECTION, SOLUTION INTRAVENOUS; SUBCUTANEOUS at 06:43

## 2019-12-09 RX ADMIN — THERA TABS SCH EACH: TAB at 09:19

## 2019-12-09 RX ADMIN — INSULIN ASPART SCH UNIT: 100 INJECTION, SOLUTION INTRAVENOUS; SUBCUTANEOUS at 18:13

## 2019-12-09 RX ADMIN — IPRATROPIUM BROMIDE AND ALBUTEROL SULFATE SCH ML: .5; 3 SOLUTION RESPIRATORY (INHALATION) at 20:10

## 2019-12-09 RX ADMIN — DORZOLAMIDE HYDROCHLORIDE AND TIMOLOL MALEATE SCH DROPS: 20; 5 SOLUTION/ DROPS OPHTHALMIC at 09:19

## 2019-12-09 RX ADMIN — SODIUM CHLORIDE, PRESERVATIVE FREE SCH: 5 INJECTION INTRAVENOUS at 21:05

## 2019-12-09 RX ADMIN — DORZOLAMIDE HYDROCHLORIDE AND TIMOLOL MALEATE SCH DROPS: 20; 5 SOLUTION/ DROPS OPHTHALMIC at 21:19

## 2019-12-09 RX ADMIN — FAMOTIDINE SCH MG: 20 TABLET, FILM COATED ORAL at 09:19

## 2019-12-09 RX ADMIN — CEFAZOLIN SCH: 330 INJECTION, POWDER, FOR SOLUTION INTRAMUSCULAR; INTRAVENOUS at 22:58

## 2019-12-09 RX ADMIN — ATORVASTATIN CALCIUM SCH MG: 10 TABLET, FILM COATED ORAL at 09:19

## 2019-12-09 RX ADMIN — LATANOPROST SCH DROPS: 50 SOLUTION OPHTHALMIC at 21:18

## 2019-12-09 RX ADMIN — ENOXAPARIN SODIUM SCH MG: 40 INJECTION SUBCUTANEOUS at 06:47

## 2019-12-09 RX ADMIN — INSULIN ASPART SCH: 100 INJECTION, SOLUTION INTRAVENOUS; SUBCUTANEOUS at 12:09

## 2019-12-09 RX ADMIN — Medication SCH MG: at 09:19

## 2019-12-09 RX ADMIN — IPRATROPIUM BROMIDE AND ALBUTEROL SULFATE SCH ML: .5; 3 SOLUTION RESPIRATORY (INHALATION) at 10:54

## 2019-12-09 RX ADMIN — IPRATROPIUM BROMIDE AND ALBUTEROL SULFATE SCH ML: .5; 3 SOLUTION RESPIRATORY (INHALATION) at 07:21

## 2019-12-09 RX ADMIN — CEFAZOLIN SCH MLS/HR: 330 INJECTION, POWDER, FOR SOLUTION INTRAMUSCULAR; INTRAVENOUS at 18:12

## 2019-12-09 RX ADMIN — IPRATROPIUM BROMIDE AND ALBUTEROL SULFATE SCH ML: .5; 3 SOLUTION RESPIRATORY (INHALATION) at 15:43

## 2019-12-09 NOTE — XR
EXAMINATION TYPE: XR chest 2V

 

DATE OF EXAM: 12/9/2019

 

COMPARISON: 12/8/2019

 

TECHNIQUE: PA and lateral views submitted.

 

HISTORY: Shortness of breath

 

FINDINGS:

Bilateral areas of consolidation and pleural effusion noted in the heart is markedly enlarged. Pulmon
corrina arteries appear to be enlarged correlate for pulmonary arterial hypertension. Underlying venous c
ongestion not excluded. No pneumothorax. Findings are similar to the prior exam. Hypertrophic and deg
enerative changes of the spine.

 

IMPRESSION:

1. Stable x-ray with marked cardiomegaly, bilateral infiltrate and pleural effusion correlate for regla
ous congestion. Underlying pneumonia in the differential diagnosis.

2. Marked enlargement of the pulmonary arteries correlate for pulmonary arterial hypertension.

## 2019-12-09 NOTE — PN
PROGRESS NOTE



Patient is seen for followup for acute kidney injury.  Her renal function continues to

improve.  Serum creatinine had peaked at 3.1.  It is down to 2.4 now.  The patient has

had good urine output. Blood pressures had been low previously and patient was

maintained on angiotensin receptor blockers and diuretics which are currently on hold

now.  She is maintained on IV fluids as well.



PHYSICAL EXAMINATION:

On examination today, blood pressure was 136/85, heart rate of 104 per minute, patient

is afebrile. Examination of the heart S1, S2.  Examination of the lungs, bilateral

breath sounds are heard.  Abdomen is soft, nontender.  Examination of lower extremities

shows no evidence of edema. CNS exam grossly intact.



LABS:

Show sodium of 132, potassium 5.0, chloride 103, CO2 is 15, BUN 60, creatinine 2.4.



ASSESSMENT:

1. Acute kidney injury, acute tubular necrosis, currently nonoliguric, mainly

    ischemic, currently off angiotensin receptor blockers and diuretics.  Continue with

    IV fluids.

2. Non-gap metabolic acidosis initially, currently with anion gap slightly higher.

    Etiology is mainly renal failure.  I will add oral sodium bicarb and if the

    acidosis is worse, we will change it to IV bicarb.

3. Hypertension. Blood pressure currently on the lower side.  Hold off on

    antihypertensive medications for now.

4. Pneumonia, maintained on antibiotics.

5. History of paroxysmal atrial fibrillation.

6. Chronic diastolic congestive heart failure, currently not in failure.



PLAN:

Continue IV fluids. Repeat labs in a.m. Continue to avoid nephrotoxic agents.  Continue

to hold off on diuretics and angiotensin receptor blockers for now.





MMODL / IJN: [id number] / Job#: [id number]

## 2019-12-09 NOTE — P.PN
Subjective


Progress Note Date: 12/09/19


Principal diagnosis: 


 Acute hypoxic respiratory failure related to acute pneumonia, involving 

bilateral lungs and acute exacerbation of chronic diastolic heart failure





This is a 85-year-old [place] patient of Dr. Ramos, with past medical history of 

congestive heart failure with diastolic dysfunction, chronic atrial fibrillation

not on anticoagulation related to frequent falls, hypertension, diabetes 

mellitus type 2, hyperlipidemia, who came into the hospital early this morning o

n 12/06/2019 for complaints of increased shortness of breath, cough, congestion,

phlegm production with mild hemoptysis, fever.  Patient's symptoms started last 

week after Thanksgiving family gathering.  Became progressively worse, last 

night patient was dry heaving, but no vomiting, had a single episode of 

diarrhea.  Denied any increased swelling in her legs, denied chest pain, but she

is experiencing discomfort in the posterior rib cage to her scapula with deep 

breathing and coughing, chest x-ray on admission showed pulmonary vessel 

congestion and bilateral pulmonary airspace infiltrates consistent with 

pneumonia.  Admission lab work showed white blood cell count of 10.4, hemoglobin

of 10.9, d-dimer was elevated at 4.74 however CT chest did not show evidence of 

pulmonary embolism, it did show bilateral airspace patchy consolidation 

consistent with pneumonia and small right pleural effusion and changes of 

pulmonary hypertension with enlargement of the pulmonary arteries.  EKG showed a

trial fibrillation with rapid ventricular response with a rate of 116 BPM.  

Patient was significantly short of breath on admission, and hypoxemic requiring 

100% nonrebreather, she was given a dose of IV Lasix started on empiric AmBisome

the form of Zithromax and Rocephin for possibility of community acquired 

pneumonia, and her FiO2 has since been weaned down to 2 L, her pulse ox is 95%, 

she is still tachycardic, and she was given Cardizem infusion for rate control. 

She has a very congested  cough, she has been started on breathing treatments, 

and were consulted for evaluation of shortness of breath related to bilateral 

pneumonia and possibility of congestive heart failure





On 12/07/2019 patient seen in follow-up on selective care unit, yesterday 

patient developed hypotension, with systolic as low as 60 mmHg, and her doses of

afternoon Lasix and her morning dose of Lasix had been held.  Patient was 

increasingly more lethargic, not eating much, but has not had a fever, she is on

room air, with a pulse ox of 94%.  She is on combination of Rocephin and 

Zithromax for antibiotic coverage, blood culture and sputum culture so far are 

pending, preliminary sputum culture shows few gram-positive bacilli, fungal 

cultures pending.  Clinically patient appears to be better today, she is more a

wake on today's exam, she has not had any further nausea or vomiting, her back 

pain over right upper back is improving.  Patient's family brought food from 

home, and patient is attempting to eat.  Blood pressure today is 89/58, with a 

mean of 68, room air pulse ox is 94%, no fever, lung sounds reveal diminished 

breath sounds with bibasilar crackles, no significant rhonchi, patient still has

very congested cough and bringing up much sputum today.  No complaints of chest 

pain, no lower extremity swelling, today's labs have been reviewed showing white

blood cell count 18.4, hemoglobin is 10.7, serum sodium is 134, the rest of the 

electrolytes were within normal limits, BUN is 38 and creatinine is 2.73 and 

there has been worsening of her renal function since admission.





On 12/08/2019 patient seen in follow-up.  She is awake, she is answering 

questions with the help of her daughter.  She denies any distress, denies any 

shortness of breath, occasional cough, no phlegm production, no chest or back 

pain.  No chills, appetite improving, remains on IV hydration, with 0.9 normal 

saline at a rate of 75 ML per hour.  Nephrology is following, BUN is 56 and 

creatinine is 3.16 on today's labs.  White blood cell count is 14.5, improving 

leukocytosis, hemoglobin is 10.8.  She is on combination of Rocephin and 

Zithromax.  She is voiding, but her urine is dark.  Renal ultrasound showed no 

hydronephrosis.  Blood and sputum cultures have shown no growth, she is on room 

air pulse ox 98%, afebrile.  Lung sounds reveal crackles at the bases, left 

greater than right.  Clinically improving, she is getting very to get up and get

a shower





On 12/19 2019 patient is seen in follow-up on selective care unit, a little less

upbeat on today's exam but awake and alert, she denies any worsening dyspnea, tisha

ng sounds are clear, diminished at the bases, with minimal crackles, today's 

follow-up chest x-ray showed a stable x-ray with marked cardiomegaly, bilateral 

infiltrates and pleural effusion and possibility of venous pulmonary congestion.

 Room air pulse ox is 96%, patient is afebrile, hemodynamically stable.  Her 

diuretics remain on hold, her renal function is starting to improve.  No white 

count, no fever or chills.  Sputum and blood cultures are negative.








Objective





- Vital Signs


Vital signs: 


                                   Vital Signs











Temp  98 F   12/09/19 11:52


 


Pulse  92   12/09/19 11:52


 


Resp  20   12/09/19 11:52


 


BP  106/64   12/09/19 11:52


 


Pulse Ox  96   12/09/19 11:52








                                 Intake & Output











 12/08/19 12/09/19 12/09/19





 18:59 06:59 18:59


 


Intake Total 718  240


 


Balance 718  240


 


Weight  46.3 kg 


 


Intake:   


 


  Oral 718  240


 


Other:   


 


  Voiding Method  Toilet Toilet


 


  # Voids 1 1 














- Exam


GENERAL EXAM: Alert, very pleasant, 85-year-old [place] female, with limited 

English, patient's daughter is at the bedside and translating for the patient.  

Patient appears to be more awake and responsive on today's exam, she is sitting 

up in bed, and is getting very to get a shower


HEAD: Normocephalic/atraumatic.


EYES: Normal reaction of pupils, equal size.  Conjunctiva pink, sclera white.


NOSE: Clear with pink turbinates.


THROAT: No erythema or exudates.


NECK: No masses, no JVD, no thyroid enlargement, no adenopathy.


CHEST: No chest wall deformity.  Symmetrical expansion. 


LUNGS: Equal air entry with basilar crackles left greater than right


CVS: Regular rate and rhythm, normal S1 and S2, no gallops, no murmurs, no rubs


ABDOMEN: Soft, nontender.  No hepatosplenomegaly, normal bowel sounds, no gua

rding or rigidity.


EXTREMITIES: No clubbing, no edema, no cyanosis, 2+ pulses and upper and lower 

extremities.


MUSCULOSKELETAL: Muscle strength and tone normal.


SPINE: No scoliosis or deformity


SKIN: No rashes


CENTRAL NERVOUS SYSTEM: Alert and oriented -3.  No focal deficits, tone is 

normal in all 4 extremities.


PSYCHIATRIC: Alert and oriented -3.  Appropriate affect.  Intact judgment and 

insight








- Labs


CBC & Chem 7: 


                                 12/09/19 05:18





                                 12/09/19 05:18


Labs: 


                  Abnormal Lab Results - Last 24 Hours (Table)











  12/08/19 12/08/19 12/09/19 Range/Units





  13:50 17:51 05:18 


 


MCV    75.9 L  (80.0-100.0)  fL


 


MCH    22.6 L  (25.0-35.0)  pg


 


MCHC    29.7 L  (31.0-37.0)  g/dL


 


RDW    16.1 H  (11.5-15.5)  %


 


Plt Count    101 L  (150-450)  k/uL


 


Neutrophils #    8.7 H  (1.3-7.7)  k/uL


 


Sodium     (137-145)  mmol/L


 


Carbon Dioxide     (22-30)  mmol/L


 


BUN     (7-17)  mg/dL


 


Creatinine     (0.52-1.04)  mg/dL


 


POC Glucose (mg/dL)   192 H   (75-99)  mg/dL


 


Total Bilirubin     (0.2-1.3)  mg/dL


 


AST     (14-36)  U/L


 


ALT     (9-52)  U/L


 


Albumin     (3.5-5.0)  g/dL


 


Urine Protein  Trace H    (Negative)  














  12/09/19 12/09/19 Range/Units





  05:18 12:06 


 


MCV    (80.0-100.0)  fL


 


MCH    (25.0-35.0)  pg


 


MCHC    (31.0-37.0)  g/dL


 


RDW    (11.5-15.5)  %


 


Plt Count    (150-450)  k/uL


 


Neutrophils #    (1.3-7.7)  k/uL


 


Sodium  132 L   (137-145)  mmol/L


 


Carbon Dioxide  15 L   (22-30)  mmol/L


 


BUN  60 H   (7-17)  mg/dL


 


Creatinine  2.44 H   (0.52-1.04)  mg/dL


 


POC Glucose (mg/dL)   118 H  (75-99)  mg/dL


 


Total Bilirubin  2.1 H   (0.2-1.3)  mg/dL


 


AST  197 H   (14-36)  U/L


 


ALT  68 H   (9-52)  U/L


 


Albumin  3.4 L   (3.5-5.0)  g/dL


 


Urine Protein    (Negative)  








                      Microbiology - Last 24 Hours (Table)











 12/06/19 04:45 Blood Culture - Preliminary





 Blood    No Growth after 72 hours


 


 12/06/19 12:01 Gram Stain - Final





 Sputum Sputum Culture - Final














Assessment and Plan


Plan: 


 Assessment:





#1.  Acute hypoxic respiratory failure related to acute pneumonia, involving 

bilateral lungs, chest x-ray and CTA chest showed bilateral airspace patchy 

consolidation and small right pleural effusion





#2.  Acute exacerbation of chronic diastolic heart failure related to the above





#3.  Acute kidney injury related to ATN, dehydration, diuretic therapy, 

improving





#4.  Non-anion gap metabolic acidosis related to a DOUG





#5.  Chronic atrial fibrillation, not on any chronic anticoagulation related to 

history of falls





#6.  Atrial fibrillation with RVR





#7.  Elevated d-dimer, but no evidence of pulmonary embolism on CT angios chest





#8.  Elevated troponin likely related to hypoxemia and acute exacerbation of CHF





#9.  Chronic congestive heart failure





#10.  History of hypertension





#11.  History of diabetes mellitus





#12.  History of hyperlipidemia





#13.  No history of smoking, but patient does use chewing tobacco





Plan:





Continue current antibiotics, cultures are negative thus far, no fever or 

chills, continue IV hydration, renal profile is starting to improve, no nausea, 

vomiting or diarrhea, continue breathing treatments, encourage deep breathing 

and coughing.  We will continue to follow, today's chest x-ray has been reviewed

showing stable findings of bilateral infiltrates and pleural effusions.  

Diuretics, and Ace inhibitors remain on hold.


I performed a history & physical examination of the patient and discussed their 

management with my nurse practitioner, Delicia Knowles.  I reviewed the nurse 

practitioner's note and agree with the documented findings and plan of care.  

Lung sounds are positive for crackles at bilateral bases, and right mid and 

upper lobe.  The findings and the impression was discussed with the patient.  I 

attest to the documentation by the nurse practitioner. 





Time with Patient: Less than 30

## 2019-12-09 NOTE — P.PN
Subjective


Progress Note Date: 12/09/19








This is an 85-year-old female patient who presented with complaints of shortness

of breath cough and fatigue.  Patient's daughter is at bedside.  Patient speaks 

very little English.  Per patient's daughter she's been having increasing 

shortness of breath and cough over the past few days.  Patient also reports that

she's been having increased fatigue.  Patient has been coughing but unable to 

cough anything up.  Per patient daughter denies fever or chills.  Patient does 

have a past medical history of atrial fibrillation in which she is not 

maintained on anticoagulation just aspirin, congestive heart failure diabetes 

mellitus, hyperlipidemia, hypertension and chewing tobacco use.  Patient d-dimer

elevated at 4.74.  Chest CTA was completed showing no evidence of pulmonary 

embolism.  Bilateral airspace patchy consolidation consistent with pneumonia.  

Small right pleural effusion.  Changes of pulmonary hypertension with 

enlargement of the pulmonary arteries.  Cardiomegaly.  His x-ray completed sh

owing bilateral pneumonia increase compared to old exam cardiomegaly and 

pulmonary congestion related to mild heart failure.  Pleural effusion fluid 

improved compared to last exam pulmonary congestion unchanged.  EKG completed 

showing atrial fibrillation with rapid ventricular response right axis deviation

and complete right bundle branch block.  BNP 6740.  Cardiology services have 

been consulted.  Pulmonary services are consulted.  Patient started on Lovenox 

and Rocephin for antibiotic sputum culture has been ordered.  At this time 

patient is resting comfortably in bed.  Daughter at bedside.  Patient stopping 

some shortness of breath with cough.  Patient denies chest pain.  Patient denies

nausea vomiting or diarrhea.  Patient denies any urinary burning or frequency.








On 12/07/2019 Patient was seen and examined, on the telemetry floor she is alert

and oriented 3 in no apparent distress, she is reporting improvement in her 

shortness of breath, there is no chest pain no fever or chills no headache or 

dizziness no chest pain no nausea or vomiting no abdominal pain no diarrhea and 

no urinary symptoms, there is worsening in her laboratory data was elevated 

white blood count to 18.4 and elevated BUN at 38 and elevated creatinine at 2.73





On 12/08/2019 patient was seen and examined on the telemetry floor, she is alert

and responsive in no apparent distress she is answering questions appropriately 

through her daughter who is translating, there is no fever or chills no headache

or dizziness no chest pain no shortness of breath no cough no nausea or vomiting

no abdominal pain no diarrhea no burning with urination no frequency or urgency 

no hematuria, kidney function worsened today with creatinine up to 3.13 IV fluid

was increased up to 75 mL an hour nephrology consult requested








On 12/09/2019 patient is currently resting comfortably in bed.  Daughter at 

bedside.  Patient's breathing has significantly improved.  Creatinine trending 

down to 2.44 and bun 60.  Patient denies chest pain or shortness of breath.  

Patient denies nausea vomiting or diarrhea.  Patient denies any urinary burning 

or frequency.  Patient remains on Rocephin and Zithromax for IV antibiotics





Objective





- Vital Signs


Vital signs: 


                                   Vital Signs











Temp  98 F   12/09/19 11:52


 


Pulse  92   12/09/19 11:52


 


Resp  20   12/09/19 11:52


 


BP  106/64   12/09/19 11:52


 


Pulse Ox  96   12/09/19 11:52








                                 Intake & Output











 12/08/19 12/09/19 12/09/19





 18:59 06:59 18:59


 


Intake Total 718  240


 


Balance 718  240


 


Weight  46.3 kg 


 


Intake:   


 


  Oral 718  240


 


Other:   


 


  Voiding Method  Toilet Toilet


 


  # Voids 1 1 














- Exam





In general patient is alert and oriented 3 daughter is translating questions a

nd answers in no apparent distress


Head normocephalic and atraumatic


Neck supple no JVD no goiter


Lungs diminished bilaterally, no crackles no wheezing


Heart irregular rate no gallops no murmurs


Abdomen is soft nontender nondistended positive bowel sounds no 

hepatosplenomegaly


Extremities no edema no cyanosis or clubbing


Neuro no gross focal deficits








- Labs


CBC & Chem 7: 


                                 12/09/19 05:18





                                 12/09/19 05:18


Labs: 


                  Abnormal Lab Results - Last 24 Hours (Table)











  12/08/19 12/08/19 12/09/19 Range/Units





  13:50 17:51 05:18 


 


MCV    75.9 L  (80.0-100.0)  fL


 


MCH    22.6 L  (25.0-35.0)  pg


 


MCHC    29.7 L  (31.0-37.0)  g/dL


 


RDW    16.1 H  (11.5-15.5)  %


 


Plt Count    101 L  (150-450)  k/uL


 


Neutrophils #    8.7 H  (1.3-7.7)  k/uL


 


Sodium     (137-145)  mmol/L


 


Carbon Dioxide     (22-30)  mmol/L


 


BUN     (7-17)  mg/dL


 


Creatinine     (0.52-1.04)  mg/dL


 


POC Glucose (mg/dL)   192 H   (75-99)  mg/dL


 


Total Bilirubin     (0.2-1.3)  mg/dL


 


AST     (14-36)  U/L


 


ALT     (9-52)  U/L


 


Albumin     (3.5-5.0)  g/dL


 


Urine Protein  Trace H    (Negative)  














  12/09/19 12/09/19 Range/Units





  05:18 12:06 


 


MCV    (80.0-100.0)  fL


 


MCH    (25.0-35.0)  pg


 


MCHC    (31.0-37.0)  g/dL


 


RDW    (11.5-15.5)  %


 


Plt Count    (150-450)  k/uL


 


Neutrophils #    (1.3-7.7)  k/uL


 


Sodium  132 L   (137-145)  mmol/L


 


Carbon Dioxide  15 L   (22-30)  mmol/L


 


BUN  60 H   (7-17)  mg/dL


 


Creatinine  2.44 H   (0.52-1.04)  mg/dL


 


POC Glucose (mg/dL)   118 H  (75-99)  mg/dL


 


Total Bilirubin  2.1 H   (0.2-1.3)  mg/dL


 


AST  197 H   (14-36)  U/L


 


ALT  68 H   (9-52)  U/L


 


Albumin  3.4 L   (3.5-5.0)  g/dL


 


Urine Protein    (Negative)  








                      Microbiology - Last 24 Hours (Table)











 12/06/19 04:45 Blood Culture - Preliminary





 Blood    No Growth after 72 hours


 


 12/06/19 12:01 Gram Stain - Final





 Sputum Sputum Culture - Final














Assessment and Plan


Assessment: 





1. .  Increased shortness of breath secondary to pneumonia.  Chest x-ray 

completed showing bilateral pneumonia increased compared to old exam 

cardiomegaly and pulmonary congestion related to mild heart failure.  Pleural 

fluid improved compared to last exam pulmonary congestion unchanged.  Patient's 

Rocephin and azithromycin.  Sputum culture ordered.  DuoNeb breathing 

treatments.  Repeat chest x-ray showing stable x-ray with marked cardiomegaly, 

bilateral infiltrate and pleural effusion correlate for venous congestion 

underlying pneumonia in the differential diagnosis.  Pulmonary services are 

following





2.  Atrial fibrillation with RVR.  Cardizem drip has been DC'd.  Cardiology serv

ices have been consulted.  Patient does have known history of atrial 

fibrillation.  Heart rate controlled.





3.  Elevated d-dimer.  D-dimer elevated at 4.74.  Chest CTA completed showing no

evidence of pulmonary embolism.  Bilateral airspace patchy consolidation 

consistent with pneumonia.  Small right pleural effusion changes of pulmonary 

hypertension with enlargement of the pulmonary arteries.  Cardiomegaly.





4.  Abnormal troponin.  Troponin 0.044.  Cardiology services following





5.  Chronic diastolic congestive heart failure.  2-D echo completed in August 2019 showing EF of 50-55%.  BNP 6740. Patient started on IV Lasix 40 mg every 

12.  Cardiology services following





6.  History of paroxysmal atrial fibrillation.  During previous admission in 

August anticoagulation was discussed with patient and family and was decided to 

keep patient on aspirin 325 due to problems of unsteady gait and risk of falling

and episodes of anemia with refusal of GI evaluation.  





7.  History of essential hypertension





8.  History of diabetes mellitus.  Patient's home metformin dose currently on 

hold.  Sliding scale insulin coverage added





9.  History of hyperlipidemia.  Patient maintained on Lipitor





10.  Acute kidney injury, due to IV Lasix and contrast, at this time patient was

started on IV normal saline at 50 mL an hour we will monitor closely.  

Creatinine improving to 2.44 and bun 60.  Nephrology services are following





GI prophylaxis Pepcid.  DVT prophylaxis Lovenox








I performed an examination of the patient and discussed their management with 

the Nurse Practitioner.  I have reviewed the Nurse Practitioner's notes and 

agree with the documented findings and plan of care

## 2019-12-10 LAB
ALBUMIN SERPL-MCNC: 3.3 G/DL (ref 3.5–5)
ALP SERPL-CCNC: 58 U/L (ref 38–126)
ALT SERPL-CCNC: 92 U/L (ref 9–52)
ANION GAP SERPL CALC-SCNC: 13 MMOL/L
AST SERPL-CCNC: 235 U/L (ref 14–36)
BASOPHILS # BLD AUTO: 0 K/UL (ref 0–0.2)
BASOPHILS NFR BLD AUTO: 0 %
BUN SERPL-SCNC: 62 MG/DL (ref 7–17)
CALCIUM SPEC-MCNC: 8.4 MG/DL (ref 8.4–10.2)
CHLORIDE SERPL-SCNC: 103 MMOL/L (ref 98–107)
CO2 SERPL-SCNC: 16 MMOL/L (ref 22–30)
EOSINOPHIL # BLD AUTO: 0.2 K/UL (ref 0–0.7)
EOSINOPHIL NFR BLD AUTO: 2 %
ERYTHROCYTE [DISTWIDTH] IN BLOOD BY AUTOMATED COUNT: 4.91 M/UL (ref 3.8–5.4)
ERYTHROCYTE [DISTWIDTH] IN BLOOD: 16.4 % (ref 11.5–15.5)
GLUCOSE BLD-MCNC: 119 MG/DL (ref 75–99)
GLUCOSE BLD-MCNC: 131 MG/DL (ref 75–99)
GLUCOSE BLD-MCNC: 135 MG/DL (ref 75–99)
GLUCOSE BLD-MCNC: 148 MG/DL (ref 75–99)
GLUCOSE SERPL-MCNC: 113 MG/DL (ref 74–99)
HCT VFR BLD AUTO: 37.4 % (ref 34–46)
HGB BLD-MCNC: 11.2 GM/DL (ref 11.4–16)
HYALINE CASTS UR QL AUTO: 6 /LPF (ref 0–2)
LYMPHOCYTES # SPEC AUTO: 1.3 K/UL (ref 1–4.8)
LYMPHOCYTES NFR SPEC AUTO: 17 %
MCH RBC QN AUTO: 22.9 PG (ref 25–35)
MCHC RBC AUTO-ENTMCNC: 30 G/DL (ref 31–37)
MCV RBC AUTO: 76.2 FL (ref 80–100)
MONOCYTES # BLD AUTO: 0.4 K/UL (ref 0–1)
MONOCYTES NFR BLD AUTO: 5 %
NEUTROPHILS # BLD AUTO: 5.7 K/UL (ref 1.3–7.7)
NEUTROPHILS NFR BLD AUTO: 74 %
PH UR: 5 [PH] (ref 5–8)
PLATELET # BLD AUTO: 146 K/UL (ref 150–450)
POTASSIUM SERPL-SCNC: 4.8 MMOL/L (ref 3.5–5.1)
PROT SERPL-MCNC: 6.9 G/DL (ref 6.3–8.2)
RBC UR QL: 1 /HPF (ref 0–5)
SODIUM SERPL-SCNC: 132 MMOL/L (ref 137–145)
SP GR UR: 1.02 (ref 1–1.03)
SQUAMOUS UR QL AUTO: 1 /HPF (ref 0–4)
UROBILINOGEN UR QL STRIP: <2 MG/DL (ref ?–2)
WBC # BLD AUTO: 7.6 K/UL (ref 3.8–10.6)
WBC #/AREA URNS HPF: 3 /HPF (ref 0–5)

## 2019-12-10 RX ADMIN — AZITHROMYCIN SCH MG: 500 TABLET, FILM COATED ORAL at 09:03

## 2019-12-10 RX ADMIN — INSULIN ASPART SCH: 100 INJECTION, SOLUTION INTRAVENOUS; SUBCUTANEOUS at 11:46

## 2019-12-10 RX ADMIN — IOPAMIDOL PRN ML: 612 INJECTION, SOLUTION INTRAVENOUS at 11:38

## 2019-12-10 RX ADMIN — DORZOLAMIDE HYDROCHLORIDE AND TIMOLOL MALEATE SCH DROPS: 20; 5 SOLUTION/ DROPS OPHTHALMIC at 09:03

## 2019-12-10 RX ADMIN — FAMOTIDINE SCH MG: 20 TABLET, FILM COATED ORAL at 09:03

## 2019-12-10 RX ADMIN — CEFAZOLIN SCH MLS/HR: 330 INJECTION, POWDER, FOR SOLUTION INTRAMUSCULAR; INTRAVENOUS at 16:12

## 2019-12-10 RX ADMIN — INSULIN ASPART SCH: 100 INJECTION, SOLUTION INTRAVENOUS; SUBCUTANEOUS at 06:29

## 2019-12-10 RX ADMIN — IPRATROPIUM BROMIDE AND ALBUTEROL SULFATE SCH ML: .5; 3 SOLUTION RESPIRATORY (INHALATION) at 12:34

## 2019-12-10 RX ADMIN — IOPAMIDOL PRN ML: 612 INJECTION, SOLUTION INTRAVENOUS at 10:31

## 2019-12-10 RX ADMIN — DORZOLAMIDE HYDROCHLORIDE AND TIMOLOL MALEATE SCH DROPS: 20; 5 SOLUTION/ DROPS OPHTHALMIC at 20:30

## 2019-12-10 RX ADMIN — INSULIN ASPART SCH: 100 INJECTION, SOLUTION INTRAVENOUS; SUBCUTANEOUS at 20:23

## 2019-12-10 RX ADMIN — ASPIRIN 81 MG CHEWABLE TABLET SCH MG: 81 TABLET CHEWABLE at 09:03

## 2019-12-10 RX ADMIN — IPRATROPIUM BROMIDE AND ALBUTEROL SULFATE SCH ML: .5; 3 SOLUTION RESPIRATORY (INHALATION) at 15:28

## 2019-12-10 RX ADMIN — IPRATROPIUM BROMIDE AND ALBUTEROL SULFATE SCH: .5; 3 SOLUTION RESPIRATORY (INHALATION) at 12:12

## 2019-12-10 RX ADMIN — INSULIN ASPART SCH UNIT: 100 INJECTION, SOLUTION INTRAVENOUS; SUBCUTANEOUS at 17:31

## 2019-12-10 RX ADMIN — SODIUM CHLORIDE, PRESERVATIVE FREE SCH ML: 5 INJECTION INTRAVENOUS at 20:35

## 2019-12-10 RX ADMIN — IPRATROPIUM BROMIDE AND ALBUTEROL SULFATE SCH ML: .5; 3 SOLUTION RESPIRATORY (INHALATION) at 19:31

## 2019-12-10 RX ADMIN — ENOXAPARIN SODIUM SCH MG: 30 INJECTION SUBCUTANEOUS at 06:30

## 2019-12-10 RX ADMIN — IPRATROPIUM BROMIDE AND ALBUTEROL SULFATE SCH: .5; 3 SOLUTION RESPIRATORY (INHALATION) at 09:38

## 2019-12-10 RX ADMIN — THERA TABS SCH EACH: TAB at 09:03

## 2019-12-10 RX ADMIN — LATANOPROST SCH DROPS: 50 SOLUTION OPHTHALMIC at 20:30

## 2019-12-10 RX ADMIN — Medication SCH MG: at 09:03

## 2019-12-10 RX ADMIN — SODIUM CHLORIDE, PRESERVATIVE FREE SCH: 5 INJECTION INTRAVENOUS at 09:04

## 2019-12-10 NOTE — CT
EXAMINATION TYPE: CT abdomen pelvis wo con

 

DATE OF EXAM: 12/10/2019

 

COMPARISON: Liver ultrasound dated 12/10/2019

 

HISTORY: abnormal US

 

CT DLP: 326.8 mGycm

Automated exposure control for dose reduction was used.

 

TECHNIQUE:  Helical acquisition of images was performed from the lung bases through the pelvis. Oral 
contrast was administered per protocol.

 

FINDINGS: 

 

LUNG BASES: Partially visualized heart is markedly enlarged with small pericardial effusion. There is
 partial visualization of pleural effusions with bibasilar airspace disease, at least partially atele
ctasis.

 

LIVER/GB: The unenhanced liver is unremarkable in morphology. Reflux of contrast on the exam of 12/6/
2019 was seen to the liver and congestive hepatopathy is possible given the abnormal liver enzymes.

 

PANCREAS: No significant abnormality is seen.

 

SPLEEN: No significant abnormality is seen.

 

ADRENALS: There is thickening of the left adrenal gland and slight thickening of the right adrenal gl
and, likely adrenal gland hyperplasia.

 

KIDNEYS: Numerous punctate cortical calcifications may represent cortical calcinosis or small vessel 
atherosclerosis as extensive atherosclerosis is seen of the abdominal aorta and its branches. No hydr
onephrosis of either kidney.

 

FREE AIR:  No free air is visualized

 

ADENOPATHY:  Mass adjacent to the descending duodenum could represent adenopathy. Otherwise no gross 
evidence of adenopathy in the abdomen or pelvis however there is some limitation for adenopathy given
 the lack of intravenous contrast and ascites.

 

URINARY BLADDER:  There is diffuse wall thickening. Correlate for cystitis versus incomplete distenti
on with urinalysis.

 

OSSEOUS STRUCTURES:  Diffuse osseous demineralization. Moderate multilevel degenerative change of the
 spine.

 

BOWEL:  There is a small hiatal hernia present. No dilated large or small bowel. Oral contrast is see
n within portions of the small bowel.

 

OTHER: Soft tissue density situated between the liver and inferior vena cava and anterior to the kidn
ey is closely associated with the descending duodenum. This measures 2.3 x 1.7 cm.

 

There is anasarca and small volume ascites in the abdomen and pelvis.

 

There is a presacral mass that is predominantly low-density measuring 4.7 x 4.4 x 4.5 cm however cont
ains a solid mural nodule. This nodule is ill-defined and measures approximately 1.6 cm.

 

IMPRESSION: 

 

1. COMPLEX 4.7 CM MASS WITHIN THE PRESACRAL SPACE CONTAINING LARGELY FLUID ATTENUATION WITH A SOLID M
URAL NODULE. DIFFERENTIAL IS BROAD HOWEVER CONSIDERATIONS ARE FOR SARCOMA, CHORDOMA, SOLITARY FIBROUS
 TUMOR, OR LESS LIKELY DEVELOPMENTAL CYST GIVEN THE CYSTIC COMPONENT. METASTASIS IS ALSO A POSSIBILIT
Y. MRI PELVIS COULD BE UTILIZED FOR FURTHER ASSESSMENT OR CONSIDERATION FOR PERCUTANEOUS BIOPSY.

2. SOFT TISSUE DENSITY IS SEEN ANTERIOR TO THE KIDNEY AS SEEN ON THE PRIOR ULTRASOUND BUT IS CLOSELY 
ASSOCIATED WITH THE DESCENDING DUODENUM. THIS WOULD MAKE BIOPSY DIFFICULT. ADENOPATHY, MESENTERIC MET
ASTASIS, OR GIST TUMOR ARE CONSIDERATIONS.

3. FLUID OVERLOAD WITH PARTIALLY VISUALIZED PLEURAL EFFUSIONS, MARKED CARDIOMEGALY, SMALL PERICARDIAL
 EFFUSION, MILD ASCITES, AND ANASARCA. CONGESTIVE COLOPATHY COULD BE CONSIDERED GIVEN THE ELEVATED LI
NAKLU ENZYMES.

4. DIFFUSE THICKENING OF THE URINARY BLADDER. CORRELATION WITH URINALYSIS IS RECOMMENDED.

## 2019-12-10 NOTE — XR
EXAMINATION: XR chest 2V

DATE AND TIME:  12/10/2019 1:43 PM

 

CLINICAL INDICATION: PHH; pneumonia

 

TECHNIQUE: Departmental protocol

 

COMPARISON: 12/9/2019

 

FINDINGS: The overall radiographic findings are unchanged when compared to the prior study.

 

The lungs redemonstrate scattered ill-defined added opacities which mild moderately silhouette the pu
lmonary vasculature bilaterally; correlate for interstitial phase pulmonary edema versus pneumonitis.


 

Pleural spaces are negative for pneumothorax. Blunted costophrenic angles bilaterally suggests small 
pleural effusions. 

 

The marked enlargement of the cardiac silhouette is stable.

 

No acute skeletal or soft tissue findings are evident.

 

IMPRESSION: Stable overall lung inflation; no new radiographic process.

## 2019-12-10 NOTE — P.PN
Subjective


Progress Note Date: 12/10/19








This is an 85-year-old female patient who presented with complaints of shortness

of breath cough and fatigue.  Patient's daughter is at bedside.  Patient speaks 

very little English.  Per patient's daughter she's been having increasing 

shortness of breath and cough over the past few days.  Patient also reports that

she's been having increased fatigue.  Patient has been coughing but unable to 

cough anything up.  Per patient daughter denies fever or chills.  Patient does 

have a past medical history of atrial fibrillation in which she is not 

maintained on anticoagulation just aspirin, congestive heart failure diabetes 

mellitus, hyperlipidemia, hypertension and chewing tobacco use.  Patient d-dimer

elevated at 4.74.  Chest CTA was completed showing no evidence of pulmonary 

embolism.  Bilateral airspace patchy consolidation consistent with pneumonia.  

Small right pleural effusion.  Changes of pulmonary hypertension with 

enlargement of the pulmonary arteries.  Cardiomegaly.  His x-ray completed sh

owing bilateral pneumonia increase compared to old exam cardiomegaly and 

pulmonary congestion related to mild heart failure.  Pleural effusion fluid 

improved compared to last exam pulmonary congestion unchanged.  EKG completed 

showing atrial fibrillation with rapid ventricular response right axis deviation

and complete right bundle branch block.  BNP 6740.  Cardiology services have 

been consulted.  Pulmonary services are consulted.  Patient started on Lovenox 

and Rocephin for antibiotic sputum culture has been ordered.  At this time 

patient is resting comfortably in bed.  Daughter at bedside.  Patient stopping 

some shortness of breath with cough.  Patient denies chest pain.  Patient denies

nausea vomiting or diarrhea.  Patient denies any urinary burning or frequency.








On 12/07/2019 Patient was seen and examined, on the telemetry floor she is alert

and oriented 3 in no apparent distress, she is reporting improvement in her 

shortness of breath, there is no chest pain no fever or chills no headache or 

dizziness no chest pain no nausea or vomiting no abdominal pain no diarrhea and 

no urinary symptoms, there is worsening in her laboratory data was elevated 

white blood count to 18.4 and elevated BUN at 38 and elevated creatinine at 2.73





On 12/08/2019 patient was seen and examined on the telemetry floor, she is alert

and responsive in no apparent distress she is answering questions appropriately 

through her daughter who is translating, there is no fever or chills no headache

or dizziness no chest pain no shortness of breath no cough no nausea or vomiting

no abdominal pain no diarrhea no burning with urination no frequency or urgency 

no hematuria, kidney function worsened today with creatinine up to 3.13 IV fluid

was increased up to 75 mL an hour nephrology consult requested








On 12/09/2019 patient is currently resting comfortably in bed.  Daughter at 

bedside.  Patient's breathing has significantly improved.  Creatinine trending 

down to 2.44 and bun 60.  Patient denies chest pain or shortness of breath.  

Patient denies nausea vomiting or diarrhea.  Patient denies any urinary burning 

or frequency.  Patient remains on Rocephin and Zithromax for IV antibiotics





On 12/10/2019 patient is resting comfortably in bed.  Daughter at bedside.  

Creatinine increased slightly to 2.52.  Nephrology services are following.  

Liver ultrasound completed will order CT of abdomen based on liver ultrasound 

results continue to monitor liver enzymes.  Patient denies chest pain or short

ness of breath.  Patient denies nausea vomiting or diarrhea.  Patient denies any

urinary burning or frequency





Objective





- Vital Signs


Vital signs: 


                                   Vital Signs











Temp  98.1 F   12/10/19 11:45


 


Pulse  96   12/10/19 12:45


 


Resp  20   12/10/19 11:45


 


BP  108/80   12/10/19 11:45


 


Pulse Ox  94 L  12/10/19 11:45








                                 Intake & Output











 12/09/19 12/10/19 12/10/19





 18:59 06:59 18:59


 


Intake Total 240  520


 


Output Total   100


 


Balance 240  420


 


Weight  46.9 kg 


 


Intake:   


 


  IV   400


 


    Sodium Chloride 0.9% 1,   400





    000 ml @ 75 mls/hr IV .   





    [id number] [facility] Rx#:[id number]   


 


  Oral 240  120


 


Output:   


 


  Urine   100


 


Other:   


 


  Voiding Method Toilet Toilet Toilet


 


  # Voids  1 














- Exam





In general patient is alert and oriented 3 daughter is translating questions 

and answers in no apparent distress


Head normocephalic and atraumatic


Neck supple no JVD no goiter


Lungs diminished bilaterally, no crackles no wheezing


Heart irregular rate no gallops no murmurs


Abdomen is soft nontender nondistended positive bowel sounds no 

hepatosplenomegaly


Extremities no edema no cyanosis or clubbing


Neuro no gross focal deficits








- Labs


CBC & Chem 7: 


                                 12/10/19 05:46





                                 12/10/19 05:46


Labs: 


                  Abnormal Lab Results - Last 24 Hours (Table)











  12/09/19 12/09/19 12/10/19 Range/Units





  16:42 21:13 05:46 


 


Hgb    11.2 L  (11.4-16.0)  gm/dL


 


MCV    76.2 L  (80.0-100.0)  fL


 


MCH    22.9 L  (25.0-35.0)  pg


 


MCHC    30.0 L  (31.0-37.0)  g/dL


 


RDW    16.4 H  (11.5-15.5)  %


 


Plt Count    146 L  (150-450)  k/uL


 


Sodium     (137-145)  mmol/L


 


Carbon Dioxide     (22-30)  mmol/L


 


BUN     (7-17)  mg/dL


 


Creatinine     (0.52-1.04)  mg/dL


 


Glucose     (74-99)  mg/dL


 


POC Glucose (mg/dL)  131 H  157 H   (75-99)  mg/dL


 


Total Bilirubin     (0.2-1.3)  mg/dL


 


AST     (14-36)  U/L


 


ALT     (9-52)  U/L


 


Albumin     (3.5-5.0)  g/dL














  12/10/19 12/10/19 12/10/19 Range/Units





  05:46 06:54 11:43 


 


Hgb     (11.4-16.0)  gm/dL


 


MCV     (80.0-100.0)  fL


 


MCH     (25.0-35.0)  pg


 


MCHC     (31.0-37.0)  g/dL


 


RDW     (11.5-15.5)  %


 


Plt Count     (150-450)  k/uL


 


Sodium  132 L    (137-145)  mmol/L


 


Carbon Dioxide  16 L    (22-30)  mmol/L


 


BUN  62 H    (7-17)  mg/dL


 


Creatinine  2.52 H    (0.52-1.04)  mg/dL


 


Glucose  113 H    (74-99)  mg/dL


 


POC Glucose (mg/dL)   135 H  131 H  (75-99)  mg/dL


 


Total Bilirubin  2.2 H    (0.2-1.3)  mg/dL


 


AST  235 H    (14-36)  U/L


 


ALT  92 H    (9-52)  U/L


 


Albumin  3.3 L    (3.5-5.0)  g/dL








                      Microbiology - Last 24 Hours (Table)











 12/06/19 04:45 Blood Culture - Preliminary





 Blood    No Growth after 96 hours














Assessment and Plan


Assessment: 





1. .  Increased shortness of breath secondary to pneumonia.  Chest x-ray 

completed showing bilateral pneumonia increased compared to old exam cardi

omegaly and pulmonary congestion related to mild heart failure.  Pleural fluid 

improved compared to last exam pulmonary congestion unchanged.  Patient's 

Rocephin and azithromycin.  Sputum culture ordered.  DuoNeb breathing 

treatments.  Repeat chest x-ray showing stable x-ray with marked cardiomegaly, 

bilateral infiltrate and pleural effusion correlate for venous congestion 

underlying pneumonia in the differential diagnosis.  Pulmonary services are 

following





2.  Atrial fibrillation with RVR.  Cardizem drip has been DC'd.  Cardiology 

services have been consulted.  Patient does have known history of atrial 

fibrillation.  Heart rate controlled.





3.  Elevated d-dimer.  D-dimer elevated at 4.74.  Chest CTA completed showing no

evidence of pulmonary embolism.  Bilateral airspace patchy consolidation 

consistent with pneumonia.  Small right pleural effusion changes of pulmonary 

hypertension with enlargement of the pulmonary arteries.  Cardiomegaly.





4.  Abnormal troponin.  Troponin 0.044.  Cardiology services following





5.  Chronic diastolic congestive heart failure.  2-D echo completed in August 2019 showing EF of 50-55%.  BNP 6740. Patient started on IV Lasix 40 mg every 

12.  Cardiology services following





6.  History of paroxysmal atrial fibrillation.  During previous admission in Aug

[facility] anticoagulation was discussed with patient and family and was decided to 

keep patient on aspirin 325 due to problems of unsteady gait and risk of falling

and episodes of anemia with refusal of GI evaluation.  





7.  History of essential hypertension





8.  History of diabetes mellitus.  Patient's home metformin dose currently on 

hold.  Sliding scale insulin coverage added





9.  History of hyperlipidemia.  Patient maintained on Lipitor





10.  Acute kidney injury, due to IV Lasix and contrast, at this time patient was

started on IV normal saline at 50 mL an hour we will monitor closely.  Creatini

ne improving to 2.44 and bun 60.  Nephrology services are following





11.  Elevated liver enzymes.  Liver ultrasound completed showing small amount of

ascites.  Gallbladder sludge with no diagnostic evidence of gallbladder wall 

thickening.  There is a 2.9 cm soft to mass superior to the right kidney for 

which a computed tomography scan is suggested small right pleural effusion.  

Will order CT of abdomen with oral contrast only to further assess renal mass.





GI prophylaxis Pepcid.  DVT prophylaxis Lovenox








I performed an examination of the patient and discussed their management with 

the Nurse Practitioner.  I have reviewed the Nurse Practitioner's notes and 

agree with the documented findings and plan of care

## 2019-12-10 NOTE — P.PN
Subjective


Progress Note Date: 12/10/19


Principal diagnosis: 


 Acute hypoxic respiratory failure related to acute pneumonia, involving 

bilateral lungs and acute exacerbation of chronic diastolic heart failure





This is a 85-year-old English patient of Dr. Ramos, with past medical history of 

congestive heart failure with diastolic dysfunction, chronic atrial fibrillation

not on anticoagulation related to frequent falls, hypertension, diabetes 

mellitus type 2, hyperlipidemia, who came into the hospital early this morning o

n 12/06/2019 for complaints of increased shortness of breath, cough, congestion,

phlegm production with mild hemoptysis, fever.  Patient's symptoms started last 

week after Thanksgiving family gathering.  Became progressively worse, last 

night patient was dry heaving, but no vomiting, had a single episode of 

diarrhea.  Denied any increased swelling in her legs, denied chest pain, but she

is experiencing discomfort in the posterior rib cage to her scapula with deep 

breathing and coughing, chest x-ray on admission showed pulmonary vessel 

congestion and bilateral pulmonary airspace infiltrates consistent with 

pneumonia.  Admission lab work showed white blood cell count of 10.4, hemoglobin

of 10.9, d-dimer was elevated at 4.74 however CT chest did not show evidence of 

pulmonary embolism, it did show bilateral airspace patchy consolidation 

consistent with pneumonia and small right pleural effusion and changes of 

pulmonary hypertension with enlargement of the pulmonary arteries.  EKG showed a

trial fibrillation with rapid ventricular response with a rate of 116 BPM.  

Patient was significantly short of breath on admission, and hypoxemic requiring 

100% nonrebreather, she was given a dose of IV Lasix started on empiric AmBisome

the form of Zithromax and Rocephin for possibility of community acquired 

pneumonia, and her FiO2 has since been weaned down to 2 L, her pulse ox is 95%, 

she is still tachycardic, and she was given Cardizem infusion for rate control. 

She has a very congested  cough, she has been started on breathing treatments, 

and were consulted for evaluation of shortness of breath related to bilateral 

pneumonia and possibility of congestive heart failure





On 12/07/2019 patient seen in follow-up on selective care unit, yesterday 

patient developed hypotension, with systolic as low as 60 mmHg, and her doses of

afternoon Lasix and her morning dose of Lasix had been held.  Patient was 

increasingly more lethargic, not eating much, but has not had a fever, she is on

room air, with a pulse ox of 94%.  She is on combination of Rocephin and 

Zithromax for antibiotic coverage, blood culture and sputum culture so far are 

pending, preliminary sputum culture shows few gram-positive bacilli, fungal 

cultures pending.  Clinically patient appears to be better today, she is more a

wake on today's exam, she has not had any further nausea or vomiting, her back 

pain over right upper back is improving.  Patient's family brought food from 

home, and patient is attempting to eat.  Blood pressure today is 89/58, with a 

mean of 68, room air pulse ox is 94%, no fever, lung sounds reveal diminished 

breath sounds with bibasilar crackles, no significant rhonchi, patient still has

very congested cough and bringing up much sputum today.  No complaints of chest 

pain, no lower extremity swelling, today's labs have been reviewed showing white

blood cell count 18.4, hemoglobin is 10.7, serum sodium is 134, the rest of the 

electrolytes were within normal limits, BUN is 38 and creatinine is 2.73 and 

there has been worsening of her renal function since admission.





On 12/08/2019 patient seen in follow-up.  She is awake, she is answering 

questions with the help of her daughter.  She denies any distress, denies any 

shortness of breath, occasional cough, no phlegm production, no chest or back 

pain.  No chills, appetite improving, remains on IV hydration, with 0.9 normal 

saline at a rate of 75 ML per hour.  Nephrology is following, BUN is 56 and 

creatinine is 3.16 on today's labs.  White blood cell count is 14.5, improving 

leukocytosis, hemoglobin is 10.8.  She is on combination of Rocephin and 

Zithromax.  She is voiding, but her urine is dark.  Renal ultrasound showed no 

hydronephrosis.  Blood and sputum cultures have shown no growth, she is on room 

air pulse ox 98%, afebrile.  Lung sounds reveal crackles at the bases, left 

greater than right.  Clinically improving, she is getting very to get up and get

a shower





On 12/19 2019 patient is seen in follow-up on selective care unit, a little less

upbeat on today's exam but awake and alert, she denies any worsening dyspnea, tisha

ng sounds are clear, diminished at the bases, with minimal crackles, today's 

follow-up chest x-ray showed a stable x-ray with marked cardiomegaly, bilateral 

infiltrates and pleural effusion and possibility of venous pulmonary congestion.

 Room air pulse ox is 96%, patient is afebrile, hemodynamically stable.  Her 

diuretics remain on hold, her renal function is starting to improve.  No white 

count, no fever or chills.  Sputum and blood cultures are negative.





On 12/10/2019 she seen in follow-up on selective care unit.  She is resting 

comfortably in bed, her son is at the bedside, he states patient has not been 

sleeping well at night related to noise and nursing activities, appears to be in

any acute distress, room air pulse ox is 94%, hemodynamically stable, breathing 

is nonlabored, lung sounds reveal diminished breath sounds, with a few bibasilar

crackles, patient has been afebrile, his occasional nonproductive congested 

cough, she is on a combination of Zithromax and Rocephin, and her blood and 

sputum cultures have shown no growth.  Remains on IV fluids, and today's labs 

reveal relatively stable renal profile, with BUN of 62, and creatinine of 2.52, 

sodium is 132, and CO2 is improving up to 16 on today's labs, however patient's 

liver enzymes were found to be elevated, with total bilirubin of 2.2, AST of 

235, and ALT of 92, 's and had a liver ultrasound showed small amount of a

scites, and gallbladder sludge and 2.9 cm soft tissue mass inferior to the right

kidney for which a computed tomography scan was suggested.  CT of abdomen and 

pelvis revealed a complex 4.7 cm mass within the presacral space containing 

largely fluid attenuation with a solid mural nodule with a differential 

consideration for sarcoma, chordoma, solitary fibroid tumor or metastasis, was 

also soft tissue density seen anterior to the kidney, and pleural effusions, 

cardiomegaly, small pericardial effusion, mild ascites and anasarca.  There was 

also diffuse thickening of the urinary bladder








Objective





- Vital Signs


Vital signs: 


                                   Vital Signs











Temp  98.1 F   12/10/19 11:45


 


Pulse  96   12/10/19 12:45


 


Resp  20   12/10/19 11:45


 


BP  108/80   12/10/19 11:45


 


Pulse Ox  94 L  12/10/19 11:45








                                 Intake & Output











 12/09/19 12/10/19 12/10/19





 18:59 06:59 18:59


 


Intake Total 240  520


 


Output Total   100


 


Balance 240  420


 


Weight  46.9 kg 


 


Intake:   


 


  IV   400


 


    Sodium Chloride 0.9% 1,   400





    000 ml @ 75 mls/hr IV .   





    [id number] [facility] Rx#:[id number]   


 


  Oral 240  120


 


Output:   


 


  Urine   100


 


Other:   


 


  Voiding Method Toilet Toilet Toilet


 


  # Voids  1 














- Exam


GENERAL EXAM: Alert, very pleasant, 85-year-old Laotian female, with limited 

English, patient's son is at the bedside and translating for the patient.  

Patient is resting comfortably in bed, in no acute distress,


HEAD: Normocephalic/atraumatic.


EYES: Normal reaction of pupils, equal size.  Conjunctiva pink, sclera white.


NOSE: Clear with pink turbinates.


THROAT: No erythema or exudates.


NECK: No masses, no JVD, no thyroid enlargement, no adenopathy.


CHEST: No chest wall deformity.  Symmetrical expansion. 


LUNGS: Equal air entry with basilar crackles left greater than right


CVS: Regular rate and rhythm, normal S1 and S2, no gallops, no murmurs, no rubs


ABDOMEN: Soft, nontender.  No hepatosplenomegaly, normal bowel sounds, no 

guarding or rigidity.


EXTREMITIES: No clubbing, no edema, no cyanosis, 2+ pulses and upper and lower 

extremities.


MUSCULOSKELETAL: Muscle strength and tone normal.


SPINE: No scoliosis or deformity


SKIN: No rashes


CENTRAL NERVOUS SYSTEM: Alert and oriented -3.  No focal deficits, tone is 

normal in all 4 extremities.


PSYCHIATRIC: Alert and oriented -3.  Appropriate affect.  Intact judgment and 

insight








- Labs


CBC & Chem 7: 


                                 12/10/19 05:46





                                 12/10/19 05:46


Labs: 


                  Abnormal Lab Results - Last 24 Hours (Table)











  12/09/19 12/09/19 12/10/19 Range/Units





  16:42 21:13 05:46 


 


Hgb    11.2 L  (11.4-16.0)  gm/dL


 


MCV    76.2 L  (80.0-100.0)  fL


 


MCH    22.9 L  (25.0-35.0)  pg


 


MCHC    30.0 L  (31.0-37.0)  g/dL


 


RDW    16.4 H  (11.5-15.5)  %


 


Plt Count    146 L  (150-450)  k/uL


 


Sodium     (137-145)  mmol/L


 


Carbon Dioxide     (22-30)  mmol/L


 


BUN     (7-17)  mg/dL


 


Creatinine     (0.52-1.04)  mg/dL


 


Glucose     (74-99)  mg/dL


 


POC Glucose (mg/dL)  131 H  157 H   (75-99)  mg/dL


 


Total Bilirubin     (0.2-1.3)  mg/dL


 


AST     (14-36)  U/L


 


ALT     (9-52)  U/L


 


Albumin     (3.5-5.0)  g/dL














  12/10/19 12/10/19 12/10/19 Range/Units





  05:46 06:54 11:43 


 


Hgb     (11.4-16.0)  gm/dL


 


MCV     (80.0-100.0)  fL


 


MCH     (25.0-35.0)  pg


 


MCHC     (31.0-37.0)  g/dL


 


RDW     (11.5-15.5)  %


 


Plt Count     (150-450)  k/uL


 


Sodium  132 L    (137-145)  mmol/L


 


Carbon Dioxide  16 L    (22-30)  mmol/L


 


BUN  62 H    (7-17)  mg/dL


 


Creatinine  2.52 H    (0.52-1.04)  mg/dL


 


Glucose  113 H    (74-99)  mg/dL


 


POC Glucose (mg/dL)   135 H  131 H  (75-99)  mg/dL


 


Total Bilirubin  2.2 H    (0.2-1.3)  mg/dL


 


AST  235 H    (14-36)  U/L


 


ALT  92 H    (9-52)  U/L


 


Albumin  3.3 L    (3.5-5.0)  g/dL








                      Microbiology - Last 24 Hours (Table)











 12/06/19 04:45 Blood Culture - Preliminary





 Blood    No Growth after 96 hours














Assessment and Plan


Plan: 


 Assessment:





#1.  Acute hypoxic respiratory failure related to acute pneumonia, involving 

bilateral lungs, chest x-ray and CTA chest showed bilateral airspace patchy 

consolidation and small right pleural effusion





#2.  Acute exacerbation of chronic diastolic heart failure related to the above





#3.  Acute kidney injury related to ATN, dehydration, diuretic therapy, 

improving





#4.  Non-anion gap metabolic acidosis related to a DOUG





#5.  Chronic atrial fibrillation, not on any chronic anticoagulation related to 

history of falls





#6.  Atrial fibrillation with RVR





#7.  Elevated d-dimer, but no evidence of pulmonary embolism on CT angios chest





#8.  Elevated troponin likely related to hypoxemia and acute exacerbation of CHF





#9.  Chronic congestive heart failure





#10.  History of hypertension





#11.  History of diabetes mellitus





#12.  History of hyperlipidemia





#13.  No history of smoking, but patient does use chewing tobacco





#14.  Elevated liver transaminases, possibly related to congestive hepatopathy





#15.  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 

chordoma or fibroid tumor, the possibility of a renal mass.


Plan:





Continue current antibiotics, denies any acute distress, she is on room air, no 

worsening dyspnea, no growth on sputum or blood cultures, afebrile, no nausea 

vomiting no diarrhea, we'll obtain follow-up chest x-ray tomorrow, encourage 

deep breathing and coughing, provide incentive spirometer, encouraged the 

patient to sit up in the chair.  Continue to follow.


I performed a history & physical examination of the patient and discussed their 

management with my nurse practitioner, Delicia Knowles.  I reviewed the nurse 

practitioner's note and agree with the documented findings and plan of care.  

Lung sounds are positive for crackles at bilateral bases, and right mid and 

upper lobe.  The findings and the impression was discussed with the patient.  I 

attest to the documentation by the nurse practitioner. 





Time with Patient: Less than 30

## 2019-12-10 NOTE — PN
PROGRESS NOTE



The patient is seen for followup for acute kidney injury.  Serum creatinine is at 2.5

from 2.4 yesterday.  However down from 3.1 the day before yesterday.  Patient remains

on IV fluids.  She is feeling weak.  Has been sleeping most of the day.  She is eating

but not a lot according to her family.  The patient remains on normal saline at 75 mL

an hour.



PHYSICAL EXAMINATION:

On examination today, blood pressure was 108/80, heart rate 88 per minute.  She is

afebrile.  Examination of the heart S1, S2.  Examination of the lungs, decreased breath

sounds at the bases.  Abdomen is soft, nontender.

Examination of lower extremities shows no significant edema.  CNS exam grossly intact.



LABS SHOW:

Hemoglobin 11.2, sodium 132, potassium 4.8, CO2 is 16, BUN 62, creatinine 2.5.



ASSESSMENT:

1. Acute kidney injury, mostly prerenal currently maintained on IV fluids with some

    improvement in renal function.  However, creatinine is slightly higher today.  The

    patient is not on any nephrotoxic medications.  Her blood pressure is on the lower

    side.  I will continue with the IV fluids.  No significant antihypertensive

    medications on board at this time.  UA was unremarkable with trace proteinuria and

    trace blood.  No significant pyuria or hematuria.

2. Non-gap metabolic acidosis secondary to renal failure.  Change IV fluids to IV

    bicarb.

3. Chronic atrial fibrillation with controlled ventricular response.

4. Acute hypoxic respiratory failure secondary to pneumonia.

5. Pneumonia involving bilateral lungs, maintained on antibiotics.



PLAN:

Continue IV fluids.  Continue to encourage oral intake.  Repeat postvoid residual to

rule out any underlying urine retention.  Repeat labs in a.m. Continue to avoid

nephrotoxic agents.





MMODL / IJN: [id number] / Job#: [id number]

## 2019-12-11 LAB
ALBUMIN SERPL-MCNC: 3.5 G/DL (ref 3.5–5)
ALP SERPL-CCNC: 61 U/L (ref 38–126)
ALT SERPL-CCNC: 103 U/L (ref 9–52)
ANION GAP SERPL CALC-SCNC: 15 MMOL/L
AST SERPL-CCNC: 334 U/L (ref 14–36)
BASOPHILS # BLD MANUAL: 0.08 K/UL (ref 0–0.2)
BUN SERPL-SCNC: 67 MG/DL (ref 7–17)
CALCIUM SPEC-MCNC: 8.6 MG/DL (ref 8.4–10.2)
CELLS COUNTED: 200
CHLORIDE SERPL-SCNC: 103 MMOL/L (ref 98–107)
CO2 BLDA-SCNC: 13 MMOL/L (ref 19–24)
CO2 SERPL-SCNC: 12 MMOL/L (ref 22–30)
EOSINOPHIL # BLD MANUAL: 0.15 K/UL (ref 0–0.7)
ERYTHROCYTE [DISTWIDTH] IN BLOOD BY AUTOMATED COUNT: 4.97 M/UL (ref 3.8–5.4)
ERYTHROCYTE [DISTWIDTH] IN BLOOD: 17 % (ref 11.5–15.5)
GLUCOSE BLD-MCNC: 100 MG/DL (ref 75–99)
GLUCOSE BLD-MCNC: 113 MG/DL (ref 75–99)
GLUCOSE BLD-MCNC: 119 MG/DL (ref 75–99)
GLUCOSE BLD-MCNC: 123 MG/DL (ref 75–99)
GLUCOSE BLD-MCNC: 42 MG/DL (ref 75–99)
GLUCOSE BLD-MCNC: 71 MG/DL (ref 75–99)
GLUCOSE BLD-MCNC: 74 MG/DL (ref 75–99)
GLUCOSE SERPL-MCNC: 117 MG/DL (ref 74–99)
HCO3 BLDA-SCNC: 12 MMOL/L (ref 21–25)
HCT VFR BLD AUTO: 38.6 % (ref 34–46)
HGB BLD-MCNC: 11.4 GM/DL (ref 11.4–16)
LYMPHOCYTES # BLD MANUAL: 1.39 K/UL (ref 1–4.8)
MCH RBC QN AUTO: 22.9 PG (ref 25–35)
MCHC RBC AUTO-ENTMCNC: 29.6 G/DL (ref 31–37)
MCV RBC AUTO: 77.5 FL (ref 80–100)
MONOCYTES # BLD MANUAL: 0.31 K/UL (ref 0–1)
MYELOCYTES # BLD MANUAL: 0.08 K/UL
NEUTROPHILS NFR BLD MANUAL: 70 %
NEUTS SEG # BLD MANUAL: 5.8 K/UL (ref 1.3–7.7)
PCO2 BLDA: 33 MMHG (ref 35–45)
PH BLDA: 7.15 [PH] (ref 7.35–7.45)
PLATELET # BLD AUTO: 133 K/UL (ref 150–450)
PO2 BLDA: 244 MMHG (ref 83–108)
PROT SERPL-MCNC: 7.3 G/DL (ref 6.3–8.2)
SODIUM SERPL-SCNC: 130 MMOL/L (ref 137–145)
WBC # BLD AUTO: 7.7 K/UL (ref 3.8–10.6)

## 2019-12-11 PROCEDURE — 5A09357 ASSISTANCE WITH RESPIRATORY VENTILATION, LESS THAN 24 CONSECUTIVE HOURS, CONTINUOUS POSITIVE AIRWAY PRESSURE: ICD-10-PCS

## 2019-12-11 RX ADMIN — INSULIN ASPART SCH: 100 INJECTION, SOLUTION INTRAVENOUS; SUBCUTANEOUS at 17:42

## 2019-12-11 RX ADMIN — ENOXAPARIN SODIUM SCH MG: 30 INJECTION SUBCUTANEOUS at 06:23

## 2019-12-11 RX ADMIN — CEFAZOLIN SCH MLS/HR: 330 INJECTION, POWDER, FOR SOLUTION INTRAMUSCULAR; INTRAVENOUS at 04:54

## 2019-12-11 RX ADMIN — INSULIN ASPART SCH: 100 INJECTION, SOLUTION INTRAVENOUS; SUBCUTANEOUS at 11:55

## 2019-12-11 RX ADMIN — SODIUM CHLORIDE, PRESERVATIVE FREE SCH: 5 INJECTION INTRAVENOUS at 10:12

## 2019-12-11 RX ADMIN — FAMOTIDINE SCH MG: 20 TABLET, FILM COATED ORAL at 10:11

## 2019-12-11 RX ADMIN — IPRATROPIUM BROMIDE AND ALBUTEROL SULFATE SCH: .5; 3 SOLUTION RESPIRATORY (INHALATION) at 12:44

## 2019-12-11 RX ADMIN — INSULIN ASPART SCH: 100 INJECTION, SOLUTION INTRAVENOUS; SUBCUTANEOUS at 06:21

## 2019-12-11 RX ADMIN — IPRATROPIUM BROMIDE AND ALBUTEROL SULFATE SCH: .5; 3 SOLUTION RESPIRATORY (INHALATION) at 09:35

## 2019-12-11 RX ADMIN — THERA TABS SCH EACH: TAB at 10:11

## 2019-12-11 RX ADMIN — INSULIN ASPART SCH: 100 INJECTION, SOLUTION INTRAVENOUS; SUBCUTANEOUS at 21:22

## 2019-12-11 RX ADMIN — Medication SCH MG: at 10:11

## 2019-12-11 RX ADMIN — ASPIRIN 81 MG CHEWABLE TABLET SCH MG: 81 TABLET CHEWABLE at 10:11

## 2019-12-11 RX ADMIN — DORZOLAMIDE HYDROCHLORIDE AND TIMOLOL MALEATE SCH DROPS: 20; 5 SOLUTION/ DROPS OPHTHALMIC at 10:11

## 2019-12-11 RX ADMIN — DORZOLAMIDE HYDROCHLORIDE AND TIMOLOL MALEATE SCH: 20; 5 SOLUTION/ DROPS OPHTHALMIC at 20:45

## 2019-12-11 RX ADMIN — LATANOPROST SCH: 50 SOLUTION OPHTHALMIC at 20:45

## 2019-12-11 RX ADMIN — SODIUM CHLORIDE, PRESERVATIVE FREE SCH: 5 INJECTION INTRAVENOUS at 21:23

## 2019-12-11 RX ADMIN — AZITHROMYCIN SCH MG: 500 TABLET, FILM COATED ORAL at 10:11

## 2019-12-11 RX ADMIN — POTASSIUM CHLORIDE SCH MLS/HR: 14.9 INJECTION, SOLUTION INTRAVENOUS at 11:47

## 2019-12-11 RX ADMIN — ACETAMINOPHEN PRN MG: 500 TABLET ORAL at 06:26

## 2019-12-11 RX ADMIN — IPRATROPIUM BROMIDE AND ALBUTEROL SULFATE SCH: .5; 3 SOLUTION RESPIRATORY (INHALATION) at 15:08

## 2019-12-11 RX ADMIN — IPRATROPIUM BROMIDE AND ALBUTEROL SULFATE SCH ML: .5; 3 SOLUTION RESPIRATORY (INHALATION) at 21:41

## 2019-12-11 NOTE — P.GSCN
History of Present Illness


Consult date: 12/11/19


Reason for Consult: 





Presacral mass


History of present illness: 





85-year-old female admitted to the hospital with community-acquired pneumonia.  

Patient also during this hospitalization with evidence of congestive heart 

failure.  Yesterday patient was somewhat hypotensive.  Remains on selective 

care.  CAT scan abdomen and pelvis was ordered after the patient was found have 

elevated liver enzymes and a liver ultrasound revealed possible mass adjacent to

right kidney.  CAT scan incidentally was found to demonstrate a mixed cystic 

lesion posterior to the rectum in the presacral space.  Various etiologies were 

described by radiology.  Percutaneous biopsy/drain versus MRI was advised to 

better evaluate the etiology.  Oncology has been consulted as well.  Patient 

unfortunately during the hospital stay has demonstrated more delirium and con

fusion.  Patient has had poor oral intake during this hospital stay.  Patient 

and her family are not interested in any aggressive intervention and are 

interested in "comfort measures".  Patient describes aches throughout the body 

but no discrete location.  Upon admission patient had an episode of vomiting and

some diarrhea but that seems to have resolved.





Review of Systems


ROS unobtainable: due to mental status





Past Medical History


Past Medical History: Heart Failure, Diabetes Mellitus, Hyperlipidemia, 

Hypertension


Additional Past Medical History / Comment(s): cardiomegaly


History of Any Multi-Drug Resistant Organisms: None Reported


Additional Past Surgical History / Comment(s): eye surgery


Past Psychological History: No Psychological Hx Reported


Smoking Status: Former smoker


Past Alcohol Use History: None Reported


Past Drug Use History: None Reported





Medications and Allergies


                                Home Medications











 Medication  Instructions  Recorded  Confirmed  Type


 


ALPRAZolam [Xanax] 0.5 mg PO HS PRN 09/04/18 12/06/19 History


 


Atorvastatin [Lipitor] 10 mg PO DAILY 09/04/18 12/06/19 History


 


Ferrous Sulfate [Iron (65  mg PO DAILY 09/04/18 12/06/19 History





Elemental)]    


 


metFORMIN HCL [Glucophage] 500 mg PO BID-W/MEALS 09/04/18 12/06/19 History


 


Bimatoprost [Lumigan .01% Ophth 1 drop BOTH EYES HS 08/12/19 12/06/19 History





Soln]    


 


Multivitamins, Thera [Multivitamin 1 tab PO DAILY 08/12/19 12/06/19 History





(formulary)]    


 


Aspirin 325 mg PO DAILY 30 Days #30 tab 08/15/19 12/06/19 Rx


 


Furosemide [Lasix] 40 mg PO DAILY 30 Days #30 tab 08/15/19 12/06/19 Rx


 


Valsartan [Diovan] 40 mg PO AC-SUPPER 30 Days #30 tab 08/15/19 12/06/19 Rx


 


Dorzolamide-Timol 2.23%/0.68% 1 drop BOTH EYES BID 12/06/19 12/06/19 History





[Cosopt]    








                                    Allergies











Allergy/AdvReac Type Severity Reaction Status Date / Time


 


No Known Allergies Allergy   Verified 12/06/19 07:01














Surgical - Exam


                                   Vital Signs











Temp Pulse Resp BP Pulse Ox


 


 98.5 F   110 H  34 H  144/96   100 


 


 12/06/19 04:06  12/06/19 04:06  12/06/19 04:06  12/06/19 04:06  12/06/19 04:06

















Physical exam:


General: Well-developed, well-nourished elderly female, confused and somewhat 

agitated


HEENT: Normocephalic


Abdomen: Nontender, nondistended


Extremities: Mild edema


Neuro: Confused and agitated





Results





- Labs





                                 12/11/19 05:31





                                 12/11/19 05:31


                  Abnormal Lab Results - Last 24 Hours (Table)











  12/10/19 12/10/19 12/10/19 Range/Units





  11:43 13:30 16:49 


 


MCV     (80.0-100.0)  fL


 


MCH     (25.0-35.0)  pg


 


MCHC     (31.0-37.0)  g/dL


 


RDW     (11.5-15.5)  %


 


Plt Count     (150-450)  k/uL


 


Myelocytes # (Manual)     (0)  k/uL


 


Nucleated RBCs     (0-0)  /100 WBC


 


Sodium     (137-145)  mmol/L


 


Carbon Dioxide     (22-30)  mmol/L


 


BUN     (7-17)  mg/dL


 


Creatinine     (0.52-1.04)  mg/dL


 


Glucose     (74-99)  mg/dL


 


POC Glucose (mg/dL)  131 H   148 H  (75-99)  mg/dL


 


Total Bilirubin     (0.2-1.3)  mg/dL


 


AST     (14-36)  U/L


 


ALT     (9-52)  U/L


 


Urine Protein   Trace H   (Negative)  


 


Urine Blood   Trace H   (Negative)  


 


Urine Bacteria   Rare H   (None)  /hpf


 


Hyaline Casts   6 H   (0-2)  /lpf


 


Urine Mucus   Rare H   (None)  /hpf














  12/10/19 12/11/19 12/11/19 Range/Units





  19:59 05:31 05:31 


 


MCV   77.5 L   (80.0-100.0)  fL


 


MCH   22.9 L   (25.0-35.0)  pg


 


MCHC   29.6 L   (31.0-37.0)  g/dL


 


RDW   17.0 H   (11.5-15.5)  %


 


Plt Count   133 L   (150-450)  k/uL


 


Myelocytes # (Manual)   0.08 H   (0)  k/uL


 


Nucleated RBCs   11 H   (0-0)  /100 WBC


 


Sodium    130 L  (137-145)  mmol/L


 


Carbon Dioxide    12 L  (22-30)  mmol/L


 


BUN    67 H  (7-17)  mg/dL


 


Creatinine    2.41 H  (0.52-1.04)  mg/dL


 


Glucose    117 H  (74-99)  mg/dL


 


POC Glucose (mg/dL)  119 H    (75-99)  mg/dL


 


Total Bilirubin    3.0 H  (0.2-1.3)  mg/dL


 


AST    334 H  (14-36)  U/L


 


ALT    103 H  (9-52)  U/L


 


Urine Protein     (Negative)  


 


Urine Blood     (Negative)  


 


Urine Bacteria     (None)  /hpf


 


Hyaline Casts     (0-2)  /lpf


 


Urine Mucus     (None)  /hpf














  12/11/19 Range/Units





  06:18 


 


MCV   (80.0-100.0)  fL


 


MCH   (25.0-35.0)  pg


 


MCHC   (31.0-37.0)  g/dL


 


RDW   (11.5-15.5)  %


 


Plt Count   (150-450)  k/uL


 


Myelocytes # (Manual)   (0)  k/uL


 


Nucleated RBCs   (0-0)  /100 WBC


 


Sodium   (137-145)  mmol/L


 


Carbon Dioxide   (22-30)  mmol/L


 


BUN   (7-17)  mg/dL


 


Creatinine   (0.52-1.04)  mg/dL


 


Glucose   (74-99)  mg/dL


 


POC Glucose (mg/dL)  119 H  (75-99)  mg/dL


 


Total Bilirubin   (0.2-1.3)  mg/dL


 


AST   (14-36)  U/L


 


ALT   (9-52)  U/L


 


Urine Protein   (Negative)  


 


Urine Blood   (Negative)  


 


Urine Bacteria   (None)  /hpf


 


Hyaline Casts   (0-2)  /lpf


 


Urine Mucus   (None)  /hpf








                      Microbiology - Last 24 Hours (Table)











 12/06/19 04:45 Blood Culture - Preliminary





 Blood    No Growth after 120 hours








                                 Diabetes panel











  12/11/19 Range/Units





  05:31 


 


Sodium  130 L  (137-145)  mmol/L


 


Potassium    (3.5-5.1)  mmol/L


 


Chloride  103  ()  mmol/L


 


Carbon Dioxide  12 L  (22-30)  mmol/L


 


BUN  67 H  (7-17)  mg/dL


 


Creatinine  2.41 H  (0.52-1.04)  mg/dL


 


Glucose  117 H  (74-99)  mg/dL


 


Calcium  8.6  (8.4-10.2)  mg/dL


 


AST  334 H  (14-36)  U/L


 


ALT  103 H  (9-52)  U/L


 


Alkaline Phosphatase  61  ()  U/L


 


Total Protein  7.3  (6.3-8.2)  g/dL


 


Albumin  3.5  (3.5-5.0)  g/dL








                                  Calcium panel











  12/11/19 Range/Units





  05:31 


 


Calcium  8.6  (8.4-10.2)  mg/dL


 


Albumin  3.5  (3.5-5.0)  g/dL








                                 Pituitary panel











  12/11/19 Range/Units





  05:31 


 


Sodium  130 L  (137-145)  mmol/L


 


Potassium    (3.5-5.1)  mmol/L


 


Chloride  103  ()  mmol/L


 


Carbon Dioxide  12 L  (22-30)  mmol/L


 


BUN  67 H  (7-17)  mg/dL


 


Creatinine  2.41 H  (0.52-1.04)  mg/dL


 


Glucose  117 H  (74-99)  mg/dL


 


Calcium  8.6  (8.4-10.2)  mg/dL








                                  Adrenal panel











  12/11/19 Range/Units





  05:31 


 


Sodium  130 L  (137-145)  mmol/L


 


Potassium    (3.5-5.1)  mmol/L


 


Chloride  103  ()  mmol/L


 


Carbon Dioxide  12 L  (22-30)  mmol/L


 


BUN  67 H  (7-17)  mg/dL


 


Creatinine  2.41 H  (0.52-1.04)  mg/dL


 


Glucose  117 H  (74-99)  mg/dL


 


Calcium  8.6  (8.4-10.2)  mg/dL


 


Total Bilirubin  3.0 H  (0.2-1.3)  mg/dL


 


AST  334 H  (14-36)  U/L


 


ALT  103 H  (9-52)  U/L


 


Alkaline Phosphatase  61  ()  U/L


 


Total Protein  7.3  (6.3-8.2)  g/dL


 


Albumin  3.5  (3.5-5.0)  g/dL














Assessment and Plan


(1) Presacral mass


Narrative/Plan: 


85-year-old female with CAT scan findings of presacral mass.  No prior studies 

per the family at outside institutions.  Consider MRI if the patient's overall 

health improves.  Await oncology evaluation.  Will follow.


Current Visit: Yes   Status: Acute   Code(s): R19.09 - OTHER INTRA-ABDOMINAL AND

 PELVIC SWELLING, MASS AND LUMP   SNOMED Code(s): 68214362

## 2019-12-11 NOTE — P.PN
Subjective


Progress Note Date: 12/11/19


Principal diagnosis: 


 Acute hypoxic respiratory failure related to acute pneumonia, involving 

bilateral lungs and acute exacerbation of chronic diastolic heart failure





This is a 85-year-old [place] patient of Dr. Ramos, with past medical history of 

congestive heart failure with diastolic dysfunction, chronic atrial fibrillation

not on anticoagulation related to frequent falls, hypertension, diabetes 

mellitus type 2, hyperlipidemia, who came into the hospital early this morning o

n 12/06/2019 for complaints of increased shortness of breath, cough, congestion,

phlegm production with mild hemoptysis, fever.  Patient's symptoms started last 

week after Thanksgiving family gathering.  Became progressively worse, last 

night patient was dry heaving, but no vomiting, had a single episode of 

diarrhea.  Denied any increased swelling in her legs, denied chest pain, but she

is experiencing discomfort in the posterior rib cage to her scapula with deep 

breathing and coughing, chest x-ray on admission showed pulmonary vessel 

congestion and bilateral pulmonary airspace infiltrates consistent with 

pneumonia.  Admission lab work showed white blood cell count of 10.4, hemoglobin

of 10.9, d-dimer was elevated at 4.74 however CT chest did not show evidence of 

pulmonary embolism, it did show bilateral airspace patchy consolidation 

consistent with pneumonia and small right pleural effusion and changes of 

pulmonary hypertension with enlargement of the pulmonary arteries.  EKG showed a

trial fibrillation with rapid ventricular response with a rate of 116 BPM.  

Patient was significantly short of breath on admission, and hypoxemic requiring 

100% nonrebreather, she was given a dose of IV Lasix started on empiric AmBisome

the form of Zithromax and Rocephin for possibility of community acquired 

pneumonia, and her FiO2 has since been weaned down to 2 L, her pulse ox is 95%, 

she is still tachycardic, and she was given Cardizem infusion for rate control. 

She has a very congested  cough, she has been started on breathing treatments, 

and were consulted for evaluation of shortness of breath related to bilateral 

pneumonia and possibility of congestive heart failure





On 12/07/2019 patient seen in follow-up on selective care unit, yesterday 

patient developed hypotension, with systolic as low as 60 mmHg, and her doses of

afternoon Lasix and her morning dose of Lasix had been held.  Patient was 

increasingly more lethargic, not eating much, but has not had a fever, she is on

room air, with a pulse ox of 94%.  She is on combination of Rocephin and 

Zithromax for antibiotic coverage, blood culture and sputum culture so far are 

pending, preliminary sputum culture shows few gram-positive bacilli, fungal 

cultures pending.  Clinically patient appears to be better today, she is more a

wake on today's exam, she has not had any further nausea or vomiting, her back 

pain over right upper back is improving.  Patient's family brought food from 

home, and patient is attempting to eat.  Blood pressure today is 89/58, with a 

mean of 68, room air pulse ox is 94%, no fever, lung sounds reveal diminished 

breath sounds with bibasilar crackles, no significant rhonchi, patient still has

very congested cough and bringing up much sputum today.  No complaints of chest 

pain, no lower extremity swelling, today's labs have been reviewed showing white

blood cell count 18.4, hemoglobin is 10.7, serum sodium is 134, the rest of the 

electrolytes were within normal limits, BUN is 38 and creatinine is 2.73 and 

there has been worsening of her renal function since admission.





On 12/08/2019 patient seen in follow-up.  She is awake, she is answering 

questions with the help of her daughter.  She denies any distress, denies any 

shortness of breath, occasional cough, no phlegm production, no chest or back 

pain.  No chills, appetite improving, remains on IV hydration, with 0.9 normal 

saline at a rate of 75 ML per hour.  Nephrology is following, BUN is 56 and 

creatinine is 3.16 on today's labs.  White blood cell count is 14.5, improving 

leukocytosis, hemoglobin is 10.8.  She is on combination of Rocephin and 

Zithromax.  She is voiding, but her urine is dark.  Renal ultrasound showed no 

hydronephrosis.  Blood and sputum cultures have shown no growth, she is on room 

air pulse ox 98%, afebrile.  Lung sounds reveal crackles at the bases, left 

greater than right.  Clinically improving, she is getting very to get up and get

a shower





On 12/19 2019 patient is seen in follow-up on selective care unit, a little less

upbeat on today's exam but awake and alert, she denies any worsening dyspnea, tisha

ng sounds are clear, diminished at the bases, with minimal crackles, today's 

follow-up chest x-ray showed a stable x-ray with marked cardiomegaly, bilateral 

infiltrates and pleural effusion and possibility of venous pulmonary congestion.

 Room air pulse ox is 96%, patient is afebrile, hemodynamically stable.  Her 

diuretics remain on hold, her renal function is starting to improve.  No white 

count, no fever or chills.  Sputum and blood cultures are negative.





On 12/10/2019 she seen in follow-up on selective care unit.  She is resting 

comfortably in bed, her son is at the bedside, he states patient has not been 

sleeping well at night related to noise and nursing activities, appears to be in

any acute distress, room air pulse ox is 94%, hemodynamically stable, breathing 

is nonlabored, lung sounds reveal diminished breath sounds, with a few bibasilar

crackles, patient has been afebrile, his occasional nonproductive congested 

cough, she is on a combination of Zithromax and Rocephin, and her blood and 

sputum cultures have shown no growth.  Remains on IV fluids, and today's labs 

reveal relatively stable renal profile, with BUN of 62, and creatinine of 2.52, 

sodium is 132, and CO2 is improving up to 16 on today's labs, however patient's 

liver enzymes were found to be elevated, with total bilirubin of 2.2, AST of 

235, and ALT of 92, 's and had a liver ultrasound showed small amount of a

scites, and gallbladder sludge and 2.9 cm soft tissue mass inferior to the right

kidney for which a computed tomography scan was suggested.  CT of abdomen and 

pelvis revealed a complex 4.7 cm mass within the presacral space containing 

largely fluid attenuation with a solid mural nodule with a differential 

consideration for sarcoma, chordoma, solitary fibroid tumor or metastasis, was 

also soft tissue density seen anterior to the kidney, and pleural effusions, 

cardiomegaly, small pericardial effusion, mild ascites and anasarca.  There was 

also diffuse thickening of the urinary bladder





On 12/11/2019 patient has had worsening in her status, worsening dyspnea, 

worsening congestion, her FiO2 was increased to 15 L, her pulse ox is 89-95%, 

and dose of Lasix was given, however patient was having difficulty ambulating, 

was extremely weak, lethargic, full catheter was placed, and patient has not s

ignificantly diuresed.  A pulmonary evaluation patient is pretty lethargic, with

labored breathing, respirations characteristic of metabolic acidosis, this 

morning's labs revealed worsening metabolic acidosis.  Blood gas showed severe 

about acidosis, with pO2 of 244, pCO2 33, and pH of 7.15.  She was given a total

of 4 A of sodium bicarbonate, and was started on bicarbonate drip.  The chest x-

ray was obtained showing findings compatible with congestive heart failure, 

we're unable to pull up the films, and this is as read by Dr. Quinn.  She 

continues on the same antibiotics, cultures have been negative thus far, today's

labs also revealed worsening liver function, with the total bilirubin at 3.0, 

AST of 334, and ALT of 103.  Spoke to the patient's family at the bedside, 

they're a total of 8 children, 2 are at the bedside currently, they would like 

to proceed with the medical treatment, and they do not want aggressive life 

resuscitation or placement on life-support.  We will continue to medically 

treat.  Family is in agreement. 





Objective





- Vital Signs


Vital signs: 


                                   Vital Signs











Temp  97.4 F L  12/11/19 07:55


 


Pulse  105 H  12/11/19 12:15


 


Resp  18   12/11/19 14:02


 


BP  111/70   12/11/19 12:15


 


Pulse Ox  98   12/11/19 14:02








                                 Intake & Output











 12/10/19 12/11/19 12/11/19





 18:59 06:59 18:59


 


Intake Total 1120 10 100


 


Output Total 100 150 


 


Balance 1020 -140 100


 


Weight  48.9 kg 


 


Intake:   


 


  IV 1000 10 


 


    Invasive Line 3  10 


 


    Sodium Chloride 0.9% 1, 1000  





    000 ml @ 20 mls/hr IV .   





    Q24H [facility] Rx#:[id number]   


 


  Oral 120  100


 


Output:   


 


  Urine 100 150 


 


Other:   


 


  Voiding Method Toilet Toilet 


 


  # Voids  1 


 


  # Bowel Movements  1 














- Exam


GENERAL EXAM: Unresponsive, 85-year-old Laotian female with labored 

respirations, on 100% nonrebreather, she is being placed on BiPAP support


HEAD: Normocephalic/atraumatic.


EYES: Normal reaction of pupils, equal size.  Conjunctiva pink, sclera white.


NOSE: Clear with pink turbinates.


THROAT: No erythema or exudates.


NECK: No masses, no JVD, no thyroid enlargement, no adenopathy.


CHEST: No chest wall deformity.  Symmetrical expansion. 


LUNGS: Equal air entry with basilar crackles left greater than right


CVS: Regular rate and rhythm, normal S1 and S2, no gallops, no murmurs, no rubs


ABDOMEN: Soft, nontender.  No hepatosplenomegaly, normal bowel sounds, no 

guarding or rigidity.


EXTREMITIES: No clubbing, no edema, no cyanosis, 2+ pulses and upper and lower 

extremities.


MUSCULOSKELETAL: Muscle strength and tone normal.


SPINE: No scoliosis or deformity


SKIN: No rashes


CENTRAL NERVOUS SYSTEM: Is positive, with labored breathing, on 100% 

nonrebreather mask, she is being placed on BiPAP support  No focal deficits, 

tone is normal in all 4 extremities.











- Labs


CBC & Chem 7: 


                                 12/11/19 05:31





                                 12/11/19 05:31


Labs: 


                  Abnormal Lab Results - Last 24 Hours (Table)











  12/10/19 12/10/19 12/11/19 Range/Units





  16:49 19:59 05:31 


 


MCV    77.5 L  (80.0-100.0)  fL


 


MCH    22.9 L  (25.0-35.0)  pg


 


MCHC    29.6 L  (31.0-37.0)  g/dL


 


RDW    17.0 H  (11.5-15.5)  %


 


Plt Count    133 L  (150-450)  k/uL


 


Myelocytes # (Manual)    0.08 H  (0)  k/uL


 


Nucleated RBCs    11 H  (0-0)  /100 WBC


 


ABG pH     (7.35-7.45)  


 


ABG pCO2     (35-45)  mmHg


 


ABG pO2     ()  mmHg


 


ABG HCO3     (21-25)  mmol/L


 


ABG Total CO2     (19-24)  mmol/L


 


ABG O2 Saturation     (94-97)  %


 


Sodium     (137-145)  mmol/L


 


Carbon Dioxide     (22-30)  mmol/L


 


BUN     (7-17)  mg/dL


 


Creatinine     (0.52-1.04)  mg/dL


 


Glucose     (74-99)  mg/dL


 


POC Glucose (mg/dL)  148 H  119 H   (75-99)  mg/dL


 


Total Bilirubin     (0.2-1.3)  mg/dL


 


AST     (14-36)  U/L


 


ALT     (9-52)  U/L














  12/11/19 12/11/19 12/11/19 Range/Units





  05:31 06:18 11:39 


 


MCV     (80.0-100.0)  fL


 


MCH     (25.0-35.0)  pg


 


MCHC     (31.0-37.0)  g/dL


 


RDW     (11.5-15.5)  %


 


Plt Count     (150-450)  k/uL


 


Myelocytes # (Manual)     (0)  k/uL


 


Nucleated RBCs     (0-0)  /100 WBC


 


ABG pH     (7.35-7.45)  


 


ABG pCO2     (35-45)  mmHg


 


ABG pO2     ()  mmHg


 


ABG HCO3     (21-25)  mmol/L


 


ABG Total CO2     (19-24)  mmol/L


 


ABG O2 Saturation     (94-97)  %


 


Sodium  130 L    (137-145)  mmol/L


 


Carbon Dioxide  12 L    (22-30)  mmol/L


 


BUN  67 H    (7-17)  mg/dL


 


Creatinine  2.41 H    (0.52-1.04)  mg/dL


 


Glucose  117 H    (74-99)  mg/dL


 


POC Glucose (mg/dL)   119 H  69 L  (75-99)  mg/dL


 


Total Bilirubin  3.0 H    (0.2-1.3)  mg/dL


 


AST  334 H    (14-36)  U/L


 


ALT  103 H    (9-52)  U/L














  12/11/19 12/11/19 12/11/19 Range/Units





  11:53 12:00 12:16 


 


MCV     (80.0-100.0)  fL


 


MCH     (25.0-35.0)  pg


 


MCHC     (31.0-37.0)  g/dL


 


RDW     (11.5-15.5)  %


 


Plt Count     (150-450)  k/uL


 


Myelocytes # (Manual)     (0)  k/uL


 


Nucleated RBCs     (0-0)  /100 WBC


 


ABG pH   7.15 L*   (7.35-7.45)  


 


ABG pCO2   33 L   (35-45)  mmHg


 


ABG pO2   244 H   ()  mmHg


 


ABG HCO3   12 L   (21-25)  mmol/L


 


ABG Total CO2   13 L   (19-24)  mmol/L


 


ABG O2 Saturation   99.7 H   (94-97)  %


 


Sodium     (137-145)  mmol/L


 


Carbon Dioxide     (22-30)  mmol/L


 


BUN     (7-17)  mg/dL


 


Creatinine     (0.52-1.04)  mg/dL


 


Glucose     (74-99)  mg/dL


 


POC Glucose (mg/dL)  71 L   74 L  (75-99)  mg/dL


 


Total Bilirubin     (0.2-1.3)  mg/dL


 


AST     (14-36)  U/L


 


ALT     (9-52)  U/L








                      Microbiology - Last 24 Hours (Table)











 12/06/19 04:45 Blood Culture - Preliminary





 Blood    No Growth after 120 hours














Assessment and Plan


Plan: 


 Assessment:





#1.  Acute mental status change, likely related to sepsis, and metabolic 

encephalopathy related to sepsis, and possibility of acute exacerbation of CHF. 





#2.  Acute anion gap metabolic acidosis, related to sepsis, in addition to 

underlying acute kidney injury





#3.  Acute hypoxic respiratory failure related to acute pneumonia, involving 

bilateral lungs, chest x-ray and CTA chest showed bilateral airspace patchy 

consolidation and small right pleural effusion





#4.  Acute exacerbation of chronic diastolic heart failure related to the above





#5.  Acute kidney injury related to ATN, dehydration, diuretic therapy, 

improving





#6.  Non-anion gap metabolic acidosis related to a DOUG





#7.  Chronic atrial fibrillation, not on any chronic anticoagulation related to 

history of falls





#8.  Atrial fibrillation with RVR





#9.  Elevated d-dimer, but no evidence of pulmonary embolism on CT angios chest





#10.  Elevated troponin likely related to hypoxemia and acute exacerbation of 

CHF





#11.  Chronic congestive heart failure





#12.  History of hypertension





#13.  History of diabetes mellitus





#14.  History of hyperlipidemia





#15.  No history of smoking, but patient does use chewing tobacco





#16.  Elevated liver transaminases, possibly related to congestive hepatopathy





#16.  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 

chordoma or fibroid tumor, the possibility of a renal mass.





Plan:


Patient was placed on BiPAP support, she was given 4 A of sodium bicarbonate, 

her blood gas has been reviewed, and patient has severe degree of anion gap 

metabolic acidosis, and metabolic encephalopathy, hemodynamically related to 

underlying sepsis and possibility of myocardial infarction is not entirely 

excluded.  She will be pan-cultured, eating blood cultures, urine culture and 

sputum culture.  She was placed on BiPAP support with pressures of 10 and 5, and

FiO2 of 50%.  We will obtain a set of troponins, obtain a lactic acid level, and

a bicarbonate infusion, patient has received a couple doses of Lasix and has not

produced much urine, with the same antibiotics, cultures so far are negative, 

she is afebrile, overall prognosis is extremely guarded, we discussed this with 

the patient's family who wished to proceed with the supportive care but no 

aggressive resuscitation or placement on life-support at this time. 








I performed a history & physical examination of the patient and discussed their 

management with my nurse practitioner, Delicia Knowles.  I reviewed the nurse 

practitioner's note and agree with the documented findings and plan of care.  

Lung sounds are positive for crackles at bilateral bases, and right mid and 

upper lobe.  The findings and the impression was discussed with the patient.  I 

attest to the documentation by the nurse practitioner. 





Time with Patient: Less than 30

## 2019-12-11 NOTE — P.PN
Subjective


Progress Note Date: 12/11/19








This is an 85-year-old female patient who presented with complaints of shortness

of breath cough and fatigue.  Patient's daughter is at bedside.  Patient speaks 

very little English.  Per patient's daughter she's been having increasing 

shortness of breath and cough over the past few days.  Patient also reports that

she's been having increased fatigue.  Patient has been coughing but unable to 

cough anything up.  Per patient daughter denies fever or chills.  Patient does 

have a past medical history of atrial fibrillation in which she is not 

maintained on anticoagulation just aspirin, congestive heart failure diabetes 

mellitus, hyperlipidemia, hypertension and chewing tobacco use.  Patient d-dimer

elevated at 4.74.  Chest CTA was completed showing no evidence of pulmonary 

embolism.  Bilateral airspace patchy consolidation consistent with pneumonia.  

Small right pleural effusion.  Changes of pulmonary hypertension with 

enlargement of the pulmonary arteries.  Cardiomegaly.  His x-ray completed sh

owing bilateral pneumonia increase compared to old exam cardiomegaly and 

pulmonary congestion related to mild heart failure.  Pleural effusion fluid 

improved compared to last exam pulmonary congestion unchanged.  EKG completed 

showing atrial fibrillation with rapid ventricular response right axis deviation

and complete right bundle branch block.  BNP 6740.  Cardiology services have 

been consulted.  Pulmonary services are consulted.  Patient started on Lovenox 

and Rocephin for antibiotic sputum culture has been ordered.  At this time 

patient is resting comfortably in bed.  Daughter at bedside.  Patient stopping 

some shortness of breath with cough.  Patient denies chest pain.  Patient denies

nausea vomiting or diarrhea.  Patient denies any urinary burning or frequency.








On 12/07/2019 Patient was seen and examined, on the telemetry floor she is alert

and oriented 3 in no apparent distress, she is reporting improvement in her 

shortness of breath, there is no chest pain no fever or chills no headache or 

dizziness no chest pain no nausea or vomiting no abdominal pain no diarrhea and 

no urinary symptoms, there is worsening in her laboratory data was elevated 

white blood count to 18.4 and elevated BUN at 38 and elevated creatinine at 2.73





On 12/08/2019 patient was seen and examined on the telemetry floor, she is alert

and responsive in no apparent distress she is answering questions appropriately 

through her daughter who is translating, there is no fever or chills no headache

or dizziness no chest pain no shortness of breath no cough no nausea or vomiting

no abdominal pain no diarrhea no burning with urination no frequency or urgency 

no hematuria, kidney function worsened today with creatinine up to 3.13 IV fluid

was increased up to 75 mL an hour nephrology consult requested








On 12/09/2019 patient is currently resting comfortably in bed.  Daughter at 

bedside.  Patient's breathing has significantly improved.  Creatinine trending 

down to 2.44 and bun 60.  Patient denies chest pain or shortness of breath.  

Patient denies nausea vomiting or diarrhea.  Patient denies any urinary burning 

or frequency.  Patient remains on Rocephin and Zithromax for IV antibiotics





On 12/10/2019 patient is resting comfortably in bed.  Daughter at bedside.  

Creatinine increased slightly to 2.52.  Nephrology services are following.  

Liver ultrasound completed will order CT of abdomen based on liver ultrasound 

results continue to monitor liver enzymes.  Patient denies chest pain or short

ness of breath.  Patient denies nausea vomiting or diarrhea.  Patient denies any

urinary burning or frequency





On 12/11/2019 patient having increased shortness of breath and restlessness upon

examination.  Family is at bedside states patient has not been able to sleep and

having increased shortness of breath.  CT results reviewed with daughter.  At 

this time oncology and surgical services have been consulted.  Discussed case 

with Dr. Call per nephrology.  Patient will be given 1 dose of IV Lasix per 

nephrology service and started on bicarbonate drip.  Also discussed case with 

pulmonary nurse practitioner will repeat chest x-ray.





Objective





- Vital Signs


Vital signs: 


                                   Vital Signs











Temp  97.4 F L  12/11/19 07:55


 


Pulse  100   12/11/19 07:55


 


Resp  18   12/11/19 07:55


 


BP  139/96   12/11/19 07:55


 


Pulse Ox  92 L  12/11/19 07:55








                                 Intake & Output











 12/10/19 12/11/19 12/11/19





 18:59 06:59 18:59


 


Intake Total 1120 10 


 


Output Total 100 150 


 


Balance 1020 -140 


 


Weight  48.9 kg 


 


Intake:   


 


  IV 1000 10 


 


    Invasive Line 3  10 


 


    Sodium Chloride 0.9% 1, 1000  





    000 ml @ 20 mls/hr IV .   





    Q24H [facility] Rx#:[id number]   


 


  Oral 120  


 


Output:   


 


  Urine 100 150 


 


Other:   


 


  Voiding Method Toilet Toilet 


 


  # Voids  1 


 


  # Bowel Movements  1 














- Exam





In general patient is alert and oriented 3 daughter is translating questions 

and answers in no apparent distress


Head normocephalic and atraumatic


Neck supple no JVD no goiter


Lungs diminished bilaterally, no crackles no wheezing


Heart irregular rate no gallops no murmurs


Abdomen is soft nontender nondistended positive bowel sounds no 

hepatosplenomegaly


Extremities no edema no cyanosis or clubbing


Neuro no gross focal deficits








- Labs


CBC & Chem 7: 


                                 12/11/19 05:31





                                 12/11/19 05:31


Labs: 


                  Abnormal Lab Results - Last 24 Hours (Table)











  12/10/19 12/10/19 12/10/19 Range/Units





  11:43 13:30 16:49 


 


MCV     (80.0-100.0)  fL


 


MCH     (25.0-35.0)  pg


 


MCHC     (31.0-37.0)  g/dL


 


RDW     (11.5-15.5)  %


 


Plt Count     (150-450)  k/uL


 


Myelocytes # (Manual)     (0)  k/uL


 


Nucleated RBCs     (0-0)  /100 WBC


 


Sodium     (137-145)  mmol/L


 


Carbon Dioxide     (22-30)  mmol/L


 


BUN     (7-17)  mg/dL


 


Creatinine     (0.52-1.04)  mg/dL


 


Glucose     (74-99)  mg/dL


 


POC Glucose (mg/dL)  131 H   148 H  (75-99)  mg/dL


 


Total Bilirubin     (0.2-1.3)  mg/dL


 


AST     (14-36)  U/L


 


ALT     (9-52)  U/L


 


Urine Protein   Trace H   (Negative)  


 


Urine Blood   Trace H   (Negative)  


 


Urine Bacteria   Rare H   (None)  /hpf


 


Hyaline Casts   6 H   (0-2)  /lpf


 


Urine Mucus   Rare H   (None)  /hpf














  12/10/19 12/11/19 12/11/19 Range/Units





  19:59 05:31 05:31 


 


MCV   77.5 L   (80.0-100.0)  fL


 


MCH   22.9 L   (25.0-35.0)  pg


 


MCHC   29.6 L   (31.0-37.0)  g/dL


 


RDW   17.0 H   (11.5-15.5)  %


 


Plt Count   133 L   (150-450)  k/uL


 


Myelocytes # (Manual)   0.08 H   (0)  k/uL


 


Nucleated RBCs   11 H   (0-0)  /100 WBC


 


Sodium    130 L  (137-145)  mmol/L


 


Carbon Dioxide    12 L  (22-30)  mmol/L


 


BUN    67 H  (7-17)  mg/dL


 


Creatinine    2.41 H  (0.52-1.04)  mg/dL


 


Glucose    117 H  (74-99)  mg/dL


 


POC Glucose (mg/dL)  119 H    (75-99)  mg/dL


 


Total Bilirubin    3.0 H  (0.2-1.3)  mg/dL


 


AST    334 H  (14-36)  U/L


 


ALT    103 H  (9-52)  U/L


 


Urine Protein     (Negative)  


 


Urine Blood     (Negative)  


 


Urine Bacteria     (None)  /hpf


 


Hyaline Casts     (0-2)  /lpf


 


Urine Mucus     (None)  /hpf














  12/11/19 Range/Units





  06:18 


 


MCV   (80.0-100.0)  fL


 


MCH   (25.0-35.0)  pg


 


MCHC   (31.0-37.0)  g/dL


 


RDW   (11.5-15.5)  %


 


Plt Count   (150-450)  k/uL


 


Myelocytes # (Manual)   (0)  k/uL


 


Nucleated RBCs   (0-0)  /100 WBC


 


Sodium   (137-145)  mmol/L


 


Carbon Dioxide   (22-30)  mmol/L


 


BUN   (7-17)  mg/dL


 


Creatinine   (0.52-1.04)  mg/dL


 


Glucose   (74-99)  mg/dL


 


POC Glucose (mg/dL)  119 H  (75-99)  mg/dL


 


Total Bilirubin   (0.2-1.3)  mg/dL


 


AST   (14-36)  U/L


 


ALT   (9-52)  U/L


 


Urine Protein   (Negative)  


 


Urine Blood   (Negative)  


 


Urine Bacteria   (None)  /hpf


 


Hyaline Casts   (0-2)  /lpf


 


Urine Mucus   (None)  /hpf








                      Microbiology - Last 24 Hours (Table)











 12/06/19 04:45 Blood Culture - Preliminary





 Blood    No Growth after 120 hours














Assessment and Plan


Assessment: 





1. .  Increased shortness of breath secondary to pneumonia.  Chest x-ray 

completed showing bilateral pneumonia increased compared to old exam 

cardiomegaly and pulmonary congestion related to mild heart failure.  Pleural 

fluid improved compared to last exam pulmonary congestion unchanged.  Patient's 

Rocephin and azithromycin.  Sputum culture ordered.  DuoNeb breathing 

treatments.  Repeat chest x-ray showing stable x-ray with marked cardiomegaly, 

bilateral infiltrate and pleural effusion correlate for venous congestion 

underlying pneumonia in the differential diagnosis.  Pulmonary services are 

following





2.  Atrial fibrillation with RVR.  Cardizem drip has been DC'd.  Cardiology 

services have been consulted.  Patient does have known history of atrial 

fibrillation.  Heart rate controlled.





3.  Elevated d-dimer.  D-dimer elevated at 4.74.  Chest CTA completed showing no

evidence of pulmonary embolism.  Bilateral airspace patchy consolidation 

consistent with pneumonia.  Small right pleural effusion changes of pulmonary 

hypertension with enlargement of the pulmonary arteries.  Cardiomegaly.





4.  Abnormal troponin.  Troponin 0.044.  Cardiology services following





5.  Chronic diastolic congestive heart failure.  2-D echo completed in August 2019 showing EF of 50-55%.  BNP 6740. Patient started on IV Lasix 40 mg every 

12.  Cardiology services following





6.  History of paroxysmal atrial fibrillation.  During previous admission in 

August anticoagulation was discussed with patient and family and was decided to 

keep patient on aspirin 325 due to problems of unsteady gait and risk of falling

and episodes of anemia with refusal of GI evaluation.  





7.  History of essential hypertension





8.  History of diabetes mellitus.  Patient's home metformin dose currently on 

hold.  Sliding scale insulin coverage added





9.  History of hyperlipidemia.  Patient maintained on Lipitor





10.  Acute kidney injury, due to IV Lasix and contrast, at this time patient was

started on IV normal saline at 50 mL an hour we will monitor closely.  

Creatinine improving to 2.44 and bun 60.  Nephrology services are following





11.  Elevated liver enzymes.  Liver ultrasound completed showing small amount of

ascites.  Gallbladder sludge with no diagnostic evidence of gallbladder wall 

thickening.  There is a 2.9 cm soft to mass superior to the right kidney for 

which a computed tomography scan is suggested small right pleural effusion.  

Will order CT of abdomen with oral contrast only to further assess renal mass.





12.  Presacral mass.  Computed tomography scan completed showing a complex for 

0.7 cm mass within the presacral space containing largely fluid attenuation with

a solid mural nodule differential is broad however considerations are for 

sarcoma short dome of solitary fibrous tumor less likely development cysts given

cystic component metastatic is also possibility.  At this time oncology and 

surgical services have been consulted.  Per surgical services consider MRI of 

the patient's overall health improves await oncology services input





13.  Increased anxiety.  Xanax increased to every 4 hours when necessary





14.  Increased shortness of breath.  Case was discussed with nephrology 

services.  At this time IV Lasix will be given.  Pulmonary services updated.  

Repeat chest x-ray has been ordered per pulmonary





15.  Non-gap metabolic acidosis secondary to renal failure IV fluids have been 

switched to sodium bicarb per nephrology services











GI prophylaxis Pepcid.  DVT prophylaxis Lovenox








I performed an examination of the patient and discussed their management with 

the Nurse Practitioner.  I have reviewed the Nurse Practitioner's notes and 

agree with the documented findings and plan of care

## 2019-12-11 NOTE — XR
EXAMINATION TYPE: XR chest 1V portable

 

DATE OF EXAM: 12/11/2019

 

COMPARISON: 12/10/2019

 

HISTORY: Shortness of breath

 

FINDINGS:

Noted is pulmonary venous congestion with scattered infiltrates.  

 

There is also cardiomegaly and small effusions.

 

IMPRESSION:

 

Findings compatible with congestive failure.  Infiltrates of other etiology are not excluded.  

Clinical correlation and progress studies are recommended.

## 2019-12-11 NOTE — PN
PROGRESS NOTE



Patient is seen for followup for acute kidney injury.  Yesterday IV fluids were

discontinued, as patient's CT scan showed evidence of pulmonary vascular congestion.

She has been complaining of mild shortness of breath.  Patient has not been eating

much.  This morning she was quite weak.  Family is present at bedside.  They do not

want to do anything aggressive.  The family states that patient wants to go home.  They

are considering home hospice.  She has not had any evidence of urine retention.  Serum

creatinine is slightly improved to 2.4 from 2.5 yesterday.



PHYSICAL EXAMINATION:

On examination this morning, blood pressure was 139/96, heart rate 100 per minute. She

is afebrile.

EXAMINATION OF THE HEART: S1 and S2.

EXAMINATION OF LUNGS: Bilateral breath sounds are heard.

ABDOMEN:  Soft, non-tender.

Examination of lower extremities shows no significant edema.

CNS exam shows patient moving all 4 extremities.



LABS:

Sodium 130. Potassium is pending. Chloride 103. CO2 is 12, BUN 67, creatinine 2.4,

hemoglobin of 11.4 g/dL.



ASSESSMENT:

1. Acute kidney injury, acute tubular necrosis, currently slightly improved from

    yesterday.  Patient is nonoliguric and she does not have any urine retention. CT

    did not show any hydronephrosis.

2. 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  Awaiting oncology input.  No plans

    from surgical standpoint.

3. Acidosis, anion gap. Rule out lactic acidosis.  I will give an amp of bicarb.

    Start bicarb drip at 40 mL/hour and check lactic acid levels.

4. Generalized debility.

5. Mild volume overload, status post IV Lasix this morning.



PLAN:

IV Lasix today.  Sodium bicarb 1 amp IV push.  Check lactic acid level.  Start low-dose

bicarb drip for metabolic acidosis.  I discussed with patient's daughter regarding her

general condition and the renal status.  They are also considering home hospice.





MMODL / IJN: [id number] / Job#: [id number]

## 2019-12-11 NOTE — P.CONS
History of Present Illness





- Reason for Consult


Consult date: 12/11/19


presacral mass


Requesting physician: Ashley Danielle





- Chief Complaint


shortness of breath





- History of Present Illness





Patient is unable to answer my questions, family is at the bedside, patient is 

on BiPAP, she is restless





Review of Systems





from family





Past Medical History


Past Medical History: Heart Failure, Diabetes Mellitus, Hyperlipidemia, 

Hypertension


Additional Past Medical History / Comment(s): cardiomegaly


History of Any Multi-Drug Resistant Organisms: None Reported


Additional Past Surgical History / Comment(s): eye surgery


Past Psychological History: No Psychological Hx Reported


Smoking Status: Former smoker


Past Alcohol Use History: None Reported


Past Drug Use History: None Reported





Medications and Allergies


                                Home Medications











 Medication  Instructions  Recorded  Confirmed  Type


 


ALPRAZolam [Xanax] 0.5 mg PO HS PRN 09/04/18 12/06/19 History


 


Atorvastatin [Lipitor] 10 mg PO DAILY 09/04/18 12/06/19 History


 


Ferrous Sulfate [Iron (65  mg PO DAILY 09/04/18 12/06/19 History





Elemental)]    


 


metFORMIN HCL [Glucophage] 500 mg PO BID-W/MEALS 09/04/18 12/06/19 History


 


Bimatoprost [Lumigan .01% Ophth 1 drop BOTH EYES HS 08/12/19 12/06/19 History





Soln]    


 


Multivitamins, Thera [Multivitamin 1 tab PO DAILY 08/12/19 12/06/19 History





(formulary)]    


 


Aspirin 325 mg PO DAILY 30 Days #30 tab 08/15/19 12/06/19 Rx


 


Furosemide [Lasix] 40 mg PO DAILY 30 Days #30 tab 08/15/19 12/06/19 Rx


 


Valsartan [Diovan] 40 mg PO AC-SUPPER 30 Days #30 tab 08/15/19 12/06/19 Rx


 


Dorzolamide-Timol 2.23%/0.68% 1 drop BOTH EYES BID 12/06/19 12/06/19 History





[Cosopt]    








                                    Allergies











Allergy/AdvReac Type Severity Reaction Status Date / Time


 


No Known Allergies Allergy   Verified 12/06/19 07:01














Physical Exam


Vitals: 


                                   Vital Signs











  Temp Pulse Pulse Resp BP Pulse Ox


 


 12/11/19 14:02     18   98


 


 12/11/19 13:00     18  


 


 12/11/19 12:15    105 H  20  111/70  89 L


 


 12/11/19 11:40    92  20  119/80  95


 


 12/11/19 07:55  97.4 F L   100  18  139/96  92 L


 


 12/11/19 07:50     18  


 


 12/11/19 04:00  97.9 F   107 H  16  104/68  91 L


 


 12/10/19 23:52    96  16  


 


 12/10/19 23:39  97.6 F   96  16  108/73  93 L


 


 12/10/19 20:00  97.7 F   108 H  18  119/69  95


 


 12/10/19 19:47   86    


 


 12/10/19 19:31   84    








                                Intake and Output











 12/11/19 12/11/19 12/11/19





 06:59 14:59 22:59


 


Intake Total  100 


 


Output Total 150  


 


Balance -150 100 


 


Intake:   


 


  Oral  100 


 


Output:   


 


  Urine 150  


 


Other:   


 


  Voiding Method Toilet Toilet 


 


  # Voids 1  


 


  # Bowel Movements 1  


 


  Weight 48.9 kg 48.9 kg 














Well-developed, petite, frail female laying in bed, moderate to severe distress,

BiPAP in place





Results


CBC & Chem 7: 


                                 12/11/19 05:31





                                 12/11/19 05:31


Labs: 


                  Abnormal Lab Results - Last 24 Hours (Table)











  12/10/19 12/11/19 12/11/19 Range/Units





  19:59 05:31 05:31 


 


MCV   77.5 L   (80.0-100.0)  fL


 


MCH   22.9 L   (25.0-35.0)  pg


 


MCHC   29.6 L   (31.0-37.0)  g/dL


 


RDW   17.0 H   (11.5-15.5)  %


 


Plt Count   133 L   (150-450)  k/uL


 


Myelocytes # (Manual)   0.08 H   (0)  k/uL


 


Nucleated RBCs   11 H   (0-0)  /100 WBC


 


ABG pH     (7.35-7.45)  


 


ABG pCO2     (35-45)  mmHg


 


ABG pO2     ()  mmHg


 


ABG HCO3     (21-25)  mmol/L


 


ABG Total CO2     (19-24)  mmol/L


 


ABG O2 Saturation     (94-97)  %


 


Sodium    130 L  (137-145)  mmol/L


 


Carbon Dioxide    12 L  (22-30)  mmol/L


 


BUN    67 H  (7-17)  mg/dL


 


Creatinine    2.41 H  (0.52-1.04)  mg/dL


 


Glucose    117 H  (74-99)  mg/dL


 


POC Glucose (mg/dL)  119 H    (75-99)  mg/dL


 


Plasma Lactic Acid Jay Jay     (0.7-2.0)  mmol/L


 


Total Bilirubin    3.0 H  (0.2-1.3)  mg/dL


 


AST    334 H  (14-36)  U/L


 


ALT    103 H  (9-52)  U/L


 


Troponin I     (0.000-0.034)  ng/mL














  12/11/19 12/11/19 12/11/19 Range/Units





  06:18 11:39 11:53 


 


MCV     (80.0-100.0)  fL


 


MCH     (25.0-35.0)  pg


 


MCHC     (31.0-37.0)  g/dL


 


RDW     (11.5-15.5)  %


 


Plt Count     (150-450)  k/uL


 


Myelocytes # (Manual)     (0)  k/uL


 


Nucleated RBCs     (0-0)  /100 WBC


 


ABG pH     (7.35-7.45)  


 


ABG pCO2     (35-45)  mmHg


 


ABG pO2     ()  mmHg


 


ABG HCO3     (21-25)  mmol/L


 


ABG Total CO2     (19-24)  mmol/L


 


ABG O2 Saturation     (94-97)  %


 


Sodium     (137-145)  mmol/L


 


Carbon Dioxide     (22-30)  mmol/L


 


BUN     (7-17)  mg/dL


 


Creatinine     (0.52-1.04)  mg/dL


 


Glucose     (74-99)  mg/dL


 


POC Glucose (mg/dL)  119 H  69 L  71 L  (75-99)  mg/dL


 


Plasma Lactic Acid Jay Jay     (0.7-2.0)  mmol/L


 


Total Bilirubin     (0.2-1.3)  mg/dL


 


AST     (14-36)  U/L


 


ALT     (9-52)  U/L


 


Troponin I     (0.000-0.034)  ng/mL














  12/11/19 12/11/19 12/11/19 Range/Units





  12:00 12:16 14:57 


 


MCV     (80.0-100.0)  fL


 


MCH     (25.0-35.0)  pg


 


MCHC     (31.0-37.0)  g/dL


 


RDW     (11.5-15.5)  %


 


Plt Count     (150-450)  k/uL


 


Myelocytes # (Manual)     (0)  k/uL


 


Nucleated RBCs     (0-0)  /100 WBC


 


ABG pH  7.15 L*    (7.35-7.45)  


 


ABG pCO2  33 L    (35-45)  mmHg


 


ABG pO2  244 H    ()  mmHg


 


ABG HCO3  12 L    (21-25)  mmol/L


 


ABG Total CO2  13 L    (19-24)  mmol/L


 


ABG O2 Saturation  99.7 H    (94-97)  %


 


Sodium     (137-145)  mmol/L


 


Carbon Dioxide     (22-30)  mmol/L


 


BUN     (7-17)  mg/dL


 


Creatinine     (0.52-1.04)  mg/dL


 


Glucose     (74-99)  mg/dL


 


POC Glucose (mg/dL)   74 L   (75-99)  mg/dL


 


Plasma Lactic Acid Jay Jay     (0.7-2.0)  mmol/L


 


Total Bilirubin     (0.2-1.3)  mg/dL


 


AST     (14-36)  U/L


 


ALT     (9-52)  U/L


 


Troponin I    0.554 H*  (0.000-0.034)  ng/mL














  12/11/19 12/11/19 12/11/19 Range/Units





  15:07 16:29 17:09 


 


MCV     (80.0-100.0)  fL


 


MCH     (25.0-35.0)  pg


 


MCHC     (31.0-37.0)  g/dL


 


RDW     (11.5-15.5)  %


 


Plt Count     (150-450)  k/uL


 


Myelocytes # (Manual)     (0)  k/uL


 


Nucleated RBCs     (0-0)  /100 WBC


 


ABG pH     (7.35-7.45)  


 


ABG pCO2     (35-45)  mmHg


 


ABG pO2     ()  mmHg


 


ABG HCO3     (21-25)  mmol/L


 


ABG Total CO2     (19-24)  mmol/L


 


ABG O2 Saturation     (94-97)  %


 


Sodium     (137-145)  mmol/L


 


Carbon Dioxide     (22-30)  mmol/L


 


BUN     (7-17)  mg/dL


 


Creatinine     (0.52-1.04)  mg/dL


 


Glucose     (74-99)  mg/dL


 


POC Glucose (mg/dL)   113 H  42 L  (75-99)  mg/dL


 


Plasma Lactic Acid Jay Jay  7.1 H*    (0.7-2.0)  mmol/L


 


Total Bilirubin     (0.2-1.3)  mg/dL


 


AST     (14-36)  U/L


 


ALT     (9-52)  U/L


 


Troponin I     (0.000-0.034)  ng/mL














  12/11/19 Range/Units





  17:12 


 


MCV   (80.0-100.0)  fL


 


MCH   (25.0-35.0)  pg


 


MCHC   (31.0-37.0)  g/dL


 


RDW   (11.5-15.5)  %


 


Plt Count   (150-450)  k/uL


 


Myelocytes # (Manual)   (0)  k/uL


 


Nucleated RBCs   (0-0)  /100 WBC


 


ABG pH   (7.35-7.45)  


 


ABG pCO2   (35-45)  mmHg


 


ABG pO2   ()  mmHg


 


ABG HCO3   (21-25)  mmol/L


 


ABG Total CO2   (19-24)  mmol/L


 


ABG O2 Saturation   (94-97)  %


 


Sodium   (137-145)  mmol/L


 


Carbon Dioxide   (22-30)  mmol/L


 


BUN   (7-17)  mg/dL


 


Creatinine   (0.52-1.04)  mg/dL


 


Glucose   (74-99)  mg/dL


 


POC Glucose (mg/dL)  100 H  (75-99)  mg/dL


 


Plasma Lactic Acid Jay Jay   (0.7-2.0)  mmol/L


 


Total Bilirubin   (0.2-1.3)  mg/dL


 


AST   (14-36)  U/L


 


ALT   (9-52)  U/L


 


Troponin I   (0.000-0.034)  ng/mL








                      Microbiology - Last 24 Hours (Table)











 12/06/19 04:45 Blood Culture - Preliminary





 Blood    No Growth after 120 hours











Chest x-ray: report reviewed


CT scan - abdomen: report reviewed


CT scan - pelvis: report reviewed





Assessment and Plan


(1) Presacral mass


Narrative/Plan: 


A spine to family that unfortunately, just based on imaging, we are not able to 

speak to what the mass represents and what can be done to take care of it.  

Family agrees that patient is not a candidate for biopsy at this time.  They're 

talking to multiple providers about next steps as patient is critical.  We will 

remain available if there are any questions or concerns that we may help with.


Current Visit: Yes   Status: Acute   Priority: High   Code(s): R19.09 - OTHER 

INTRA-ABDOMINAL AND PELVIC SWELLING, MASS AND LUMP   SNOMED Code(s): 54270863

## 2019-12-12 ENCOUNTER — HOSPITAL ENCOUNTER (INPATIENT)
Dept: HOSPITAL 47 - 3SCARD | Age: 84
LOS: 1 days | Discharge: HOSPICE | DRG: 951 | End: 2019-12-13
Attending: INTERNAL MEDICINE | Admitting: INTERNAL MEDICINE
Payer: MEDICAID

## 2019-12-12 VITALS — TEMPERATURE: 97.2 F | HEART RATE: 76 BPM

## 2019-12-12 VITALS — DIASTOLIC BLOOD PRESSURE: 60 MMHG | SYSTOLIC BLOOD PRESSURE: 88 MMHG | RESPIRATION RATE: 16 BRPM

## 2019-12-12 DIAGNOSIS — J18.9: ICD-10-CM

## 2019-12-12 DIAGNOSIS — I27.20: ICD-10-CM

## 2019-12-12 DIAGNOSIS — Z79.899: ICD-10-CM

## 2019-12-12 DIAGNOSIS — I48.20: ICD-10-CM

## 2019-12-12 DIAGNOSIS — N17.0: ICD-10-CM

## 2019-12-12 DIAGNOSIS — E87.2: ICD-10-CM

## 2019-12-12 DIAGNOSIS — E11.9: ICD-10-CM

## 2019-12-12 DIAGNOSIS — Z79.84: ICD-10-CM

## 2019-12-12 DIAGNOSIS — A41.9: ICD-10-CM

## 2019-12-12 DIAGNOSIS — Z51.5: Primary | ICD-10-CM

## 2019-12-12 DIAGNOSIS — Z79.82: ICD-10-CM

## 2019-12-12 DIAGNOSIS — E78.5: ICD-10-CM

## 2019-12-12 DIAGNOSIS — I11.0: ICD-10-CM

## 2019-12-12 DIAGNOSIS — K76.1: ICD-10-CM

## 2019-12-12 DIAGNOSIS — I50.43: ICD-10-CM

## 2019-12-12 DIAGNOSIS — Z72.0: ICD-10-CM

## 2019-12-12 DIAGNOSIS — J96.01: ICD-10-CM

## 2019-12-12 DIAGNOSIS — E86.0: ICD-10-CM

## 2019-12-12 LAB
ALBUMIN SERPL-MCNC: 3.1 G/DL (ref 3.5–5)
ALP SERPL-CCNC: 57 U/L (ref 38–126)
ALT SERPL-CCNC: 372 U/L (ref 9–52)
ANION GAP SERPL CALC-SCNC: 20 MMOL/L
AST SERPL-CCNC: 1963 U/L (ref 14–36)
BUN SERPL-SCNC: 76 MG/DL (ref 7–17)
CALCIUM SPEC-MCNC: 8.1 MG/DL (ref 8.4–10.2)
CELLS COUNTED: 200
CHLORIDE SERPL-SCNC: 98 MMOL/L (ref 98–107)
CO2 SERPL-SCNC: 18 MMOL/L (ref 22–30)
ERYTHROCYTE [DISTWIDTH] IN BLOOD BY AUTOMATED COUNT: 4.78 M/UL (ref 3.8–5.4)
ERYTHROCYTE [DISTWIDTH] IN BLOOD: 17.7 % (ref 11.5–15.5)
GLUCOSE BLD-MCNC: 233 MG/DL (ref 75–99)
GLUCOSE SERPL-MCNC: 229 MG/DL (ref 74–99)
HCT VFR BLD AUTO: 35.8 % (ref 34–46)
HGB BLD-MCNC: 10.9 GM/DL (ref 11.4–16)
LYMPHOCYTES # BLD MANUAL: 0.4 K/UL (ref 1–4.8)
MCH RBC QN AUTO: 22.9 PG (ref 25–35)
MCHC RBC AUTO-ENTMCNC: 30.6 G/DL (ref 31–37)
MCV RBC AUTO: 74.9 FL (ref 80–100)
MONOCYTES # BLD MANUAL: 0.79 K/UL (ref 0–1)
NEUTROPHILS NFR BLD MANUAL: 91 %
NEUTS SEG # BLD MANUAL: 12.01 K/UL (ref 1.3–7.7)
PLATELET # BLD AUTO: 106 K/UL (ref 150–450)
POTASSIUM SERPL-SCNC: 5 MMOL/L (ref 3.5–5.1)
PROT SERPL-MCNC: 6.6 G/DL (ref 6.3–8.2)
SODIUM SERPL-SCNC: 136 MMOL/L (ref 137–145)
WBC # BLD AUTO: 13.2 K/UL (ref 3.8–10.6)

## 2019-12-12 RX ADMIN — INSULIN ASPART SCH: 100 INJECTION, SOLUTION INTRAVENOUS; SUBCUTANEOUS at 06:31

## 2019-12-12 RX ADMIN — IPRATROPIUM BROMIDE AND ALBUTEROL SULFATE SCH: .5; 3 SOLUTION RESPIRATORY (INHALATION) at 12:01

## 2019-12-12 RX ADMIN — IPRATROPIUM BROMIDE AND ALBUTEROL SULFATE SCH: .5; 3 SOLUTION RESPIRATORY (INHALATION) at 19:02

## 2019-12-12 RX ADMIN — SCOPALAMINE SCH: 1 PATCH, EXTENDED RELEASE TRANSDERMAL at 15:13

## 2019-12-12 RX ADMIN — POTASSIUM CHLORIDE SCH MLS/HR: 14.9 INJECTION, SOLUTION INTRAVENOUS at 03:52

## 2019-12-12 RX ADMIN — ENOXAPARIN SODIUM SCH MG: 30 INJECTION SUBCUTANEOUS at 06:17

## 2019-12-12 RX ADMIN — SODIUM CHLORIDE, PRESERVATIVE FREE SCH ML: 5 INJECTION INTRAVENOUS at 08:33

## 2019-12-12 RX ADMIN — DORZOLAMIDE HYDROCHLORIDE AND TIMOLOL MALEATE SCH DROPS: 20; 5 SOLUTION/ DROPS OPHTHALMIC at 08:33

## 2019-12-12 RX ADMIN — MORPHINE SULFATE PRN MG: 2 INJECTION, SOLUTION INTRAMUSCULAR; INTRAVENOUS at 18:46

## 2019-12-12 RX ADMIN — IPRATROPIUM BROMIDE AND ALBUTEROL SULFATE SCH ML: .5; 3 SOLUTION RESPIRATORY (INHALATION) at 07:50

## 2019-12-12 RX ADMIN — IPRATROPIUM BROMIDE AND ALBUTEROL SULFATE SCH: .5; 3 SOLUTION RESPIRATORY (INHALATION) at 15:49

## 2019-12-12 NOTE — P.PN
Subjective


Progress Note Date: 12/12/19


Principal diagnosis: 


 acute hypoxic respiratory failure related to acute pneumonia involving 

bilateral lungs, acute exacerbation of chronic diastolic heart failure, and 

sepsis





 on 12/12/2019 patient seen in follow-up on selective care unit.  Overnight 

patient has been maintained on BiPAP support, patient was given 1 L bolus, 4 

lactic acidosis related to sepsis. blood cultures and urine cultures pending, 

patient has been afebrile, however clinically she continued to deteriorate 

overnight, and patient's family had made a decision to make the patient 

comfortable and initiate hospice care.  This morning she is seen resting 

comfortably in bed, does not appear to be in any acute distress, her family 

members are at the bedside, her  arrived from [place].  Does not appear 

to be in any pain or does not appear to be dyspneic either.  She is just on 

nasal cannula, she still getting some IV fluids, hospice has been consulted, and

currently arrangements are being made for transfer the patient home under the 

hospice care her equipment is supposed to be delivered to her house tonight at 6

PM and the transfer supposed to be arranged sometime tomorrow morning. 








Objective





- Vital Signs


Vital signs: 


                                   Vital Signs











Temp      


 


Pulse  73   12/12/19 11:59


 


Resp  14   12/12/19 12:00


 


BP      


 


Pulse Ox      








                                 Intake & Output











 12/11/19 12/12/19 12/12/19





 18:59 06:59 18:59


 


Weight   35 kg


 


Other:   


 


  Voiding Method   Indwelling Catheter














- Exam


GENERAL EXAM: lethargic 85-year-old [place] female with on nasal cannula, in no 

acute distress


HEAD: Normocephalic/atraumatic.


EYES: Normal reaction of pupils, equal size.  Conjunctiva pink, sclera white.


NOSE: Clear with pink turbinates.


THROAT: No erythema or exudates.


NECK: No masses, no JVD, no thyroid enlargement, no adenopathy.


CHEST: No chest wall deformity.  Symmetrical expansion. 


LUNGS: Equal air entry with basilar crackles left greater than right


CVS: Regular rate and rhythm, normal S1 and S2, no gallops, no murmurs, no rubs


ABDOMEN: Soft, nontender.  No hepatosplenomegaly, normal bowel sounds, no 

guarding or rigidity.


EXTREMITIES: No clubbing, no edema, no cyanosis, 2+ pulses and upper and lower 

extremities.


MUSCULOSKELETAL: Muscle strength and tone normal.


SPINE: No scoliosis or deformity


SKIN: No rashes


CENTRAL NERVOUS SYSTEM: no focal deficits











Assessment and Plan


Plan: 


 assessment:





#1.  Acute sepsis, likely related to history of bilateral pneumonia, and 

exacerbation of chronic congestive heart failure





#2.  Acute anion gap metabolic acidosis, related to sepsis, in addition to 

underlying acute kidney injury





#3.  Acute hypoxic respiratory failure related to acute pneumonia, involving 

bilateral lungs, chest x-ray and CTA chest showed bilateral airspace patchy 

consolidation and small right pleural effusion





#4.  Acute exacerbation of chronic diastolic heart failure related to the above





#5.  Acute kidney injury related to ATN, dehydration, diuretic therapy, 

improving





#6.  Non-anion gap metabolic acidosis related to a DOUG





#7.  Chronic atrial fibrillation, not on any chronic anticoagulation related to 

history of falls





#8.  Atrial fibrillation with RVR





#9.  Elevated d-dimer, but no evidence of pulmonary embolism on CT angios chest





#10.  Elevated troponin likely related to hypoxemia and acute exacerbation of 

CHF





#11.  Chronic congestive heart failure





#12.  History of hypertension





#13.  History of diabetes mellitus





#14.  History of hyperlipidemia





#15.  No history of smoking, but patient does use chewing tobacco





#16.  Elevated liver transaminases, possibly related to congestive hepatopathy





#16.  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 

chordoma or fibroid tumor, the possibility of a renal mass.





Plan:





At this time patient's family had made a decision to withdraw medical treatment,

and proceed with hospice care, patient's wishes are to be transferred home under

the hospice care and arrangements are currently underway to deliver at equipment

in the hospital bed to patient's house and possible transfer home tomorrow 

morning, however were discussed with the family that the patient may not make it

until she is transferred home tomorrow and we will continue to make sure the 

patient is comfortable while in the hospital and hospice is already involved and

managing the patient's care FAMILY members are no at the bedside including 

patient's  from [place].  





I performed a history & physical examination of the patient and discussed their 

management with my nurse practitioner, Delicia Knowles.  I reviewed the nurse 

practitioner's note and agree with the documented findings and plan of care.  

Lung sounds are positive for s.  The findings and the impression was discussed 

with the patient.  I attest to the documentation by the nurse practitioner. 





Time with Patient: Less than 30

## 2019-12-12 NOTE — P.HPIM
History of Present Illness


H&P Date: 12/12/19





terminal diagnosis of acute hypoxic respiratory failure related to acute 

pneumonia involving bilateral lungs and acute exacerbation of chronic systolic 

heart failure and sepsis





This is an 85-year-old female patient who was originally admitted with shortness

of breath.  Patient was found to have pneumonia and acute exacerbation of CHF.  

Patient was treated with  Lasix and antibiotics.  Patient developed acute kidney

failure.  Patient progressively started to decline despite medical treatment wit

h increased Lasix and BiPAP support. family made the decision to proceed with 

hospice care.  Patient currently admitted as general inpatient under [facility] 

hospice plans for possible discharge home tomorrow.





on 12/12/2019 multiple family members at bedside.  Patient is now resting 

comfortably in bed. appears does not appear in acute distress.  Morphine and 

Ativan when necessary.   possible plans for discharge home tomorrow with home 

hospice care.





Review of Systems





please refer to HPI otherwise unremarkable





Past Medical History


Past Medical History: Heart Failure, Diabetes Mellitus, Hyperlipidemia, 

Hypertension


Additional Past Medical History / Comment(s): cardiomegaly


History of Any Multi-Drug Resistant Organisms: None Reported


Additional Past Surgical History / Comment(s): eye surgery


Past Psychological History: No Psychological Hx Reported


Smoking Status: Never smoker


Past Alcohol Use History: None Reported


Past Drug Use History: None Reported





Medications and Allergies


                                Home Medications











 Medication  Instructions  Recorded  Confirmed  Type


 


ALPRAZolam [Xanax] 0.5 mg PO HS PRN 09/04/18 12/12/19 History


 


Atorvastatin [Lipitor] 10 mg PO DAILY 09/04/18 12/12/19 History


 


Ferrous Sulfate [Iron (65  mg PO DAILY 09/04/18 12/12/19 History





Elemental)]    


 


metFORMIN HCL [Glucophage] 500 mg PO BID-W/MEALS 09/04/18 12/12/19 History


 


Bimatoprost [Lumigan .01% Ophth 1 drop BOTH EYES HS 08/12/19 12/12/19 History





Soln]    


 


Multivitamins, Thera [Multivitamin 1 tab PO DAILY 08/12/19 12/12/19 History





(formulary)]    


 


Aspirin 325 mg PO DAILY 30 Days #30 tab 08/15/19 12/12/19 Rx


 


Furosemide [Lasix] 40 mg PO DAILY 30 Days #30 tab 08/15/19 12/12/19 Rx


 


Valsartan [Diovan] 40 mg PO AC-SUPPER 30 Days #30 tab 08/15/19 12/12/19 Rx


 


Dorzolamide-Timol 2.23%/0.68% 1 drop BOTH EYES BID 12/06/19 12/12/19 History





[Cosopt]    








                                    Allergies











Allergy/AdvReac Type Severity Reaction Status Date / Time


 


No Known Allergies Allergy   Verified 12/12/19 11:45














Physical Exam


Vitals: 


                                   Vital Signs











  Pulse Resp


 


 12/12/19 12:00   14


 


 12/12/19 11:59  73  14








                                Intake and Output











 12/12/19 12/12/19 12/12/19





 06:59 14:59 22:59


 


Other:   


 


  Voiding Method  Indwelling Catheter 


 


  Weight  35 kg 














Head normocephalic


Neck supple


Lungs diminished bilaterally


Heart regular rate and rhythm S1-S2, no rub or gallop


Abdomen is soft nontender nondistended positive bowel sounds no 

hepatosplenomegaly


Extremities no edema


Neuro resting comfortably in bed in no distress





Thrombosis Risk Factor Assmnt





- Choose All That Apply


Each Risk Factor Represents 3 Points: Age 75 years or older


Thrombosis Risk Factor Assessment Total Risk Factor Score: 3


Thrombosis Risk Factor Assessment Level: Moderate Risk





Assessment and Plan


Assessment: 





1.  Acute sepsis  likely related to pneumonia





2.  Acute hypoxic respiratory failure related to acute pneumonia involving 

bilateral lungs and acute exacerbation of diastolic heart failure





3.  Chronic diastolic heart failure





4.  Metabolic acidosis related to sepsis





5. acute kidney injury





6.  History of chronic atrial fibrillation not on anticoagulation due to falls





7.  Elevated d-dimer.  D-dimer elevated at 4.74.  Chest CTA completed showing no

 evidence of pulmonary embolism.  Bilateral airspace patchy consolidation 

consistent with pneumonia.  Small right pleural effusion changes of pulmonary 

hypertension with enlargement of the pulmonary arteries.  Cardiomegaly.





8.  Abnormal troponin.  Troponin 0.044.  Cardiology services following





9.  History of essential hypertension





10.  History of diabetes mellitus.  





11.  History of hyperlipidemia. 





12.  Elevated liver enzymes likely secondary to congestive hepatopathy





13.  Presacral mass.  Computed tomography scan completed showing a complex for 

0.7 cm mass within the presacral space containing largely fluid attenuation with

 a solid mural nodule differential is broad however considerations are for 

sarcoma short dome of solitary fibrous tumor less likely development cysts given

 cystic component metastatic is also possibility.  At this time oncology and 

surgical services have been consulted.  Per surgical services consider MRI of 

the patient's overall health improves await oncology services input





Patient has been admitted to [facility].  Depending upon 

patient's status possible discharge home on hospice care tomorrow 12/13/2019








Time with Patient: Greater than 30 (Greater than 60% of the total time spent in 

counseling and coordination of care. I performed an examination of the patient 

and discussed their management with the Nurse Practitioner.  I have reviewed the

 Nurse Practitioner's notes and agree with the documented findings and plan of 

care)

## 2019-12-12 NOTE — P.DS
Providers


Date of admission: 


12/06/19 06:25





Expected date of discharge: 12/12/19


Attending physician: 


Kamlesh Ramos





Consults: 





                                        





12/06/19 06:56


Consult Physician Routine 


   Consulting Provider: Tonio Howe


   Consult Reason/Comments: Atrial fibrillation rapid ventricular rate


   Do you want consulting provider notified?: Yes





12/06/19 08:14


Consult Physician Routine 


   Consulting Provider: Luanne Edwards


   Consult Reason/Comments: penumonia


   Do you want consulting provider notified?: Yes





12/07/19 12:14


Consult Physician Routine 


   Consulting Provider: Siobhan Call


   Consult Reason/Comments: DOUG


   Do you want consulting provider notified?: Yes





12/08/19 09:48


Consult Physician Routine 


   Consulting Provider: Siobhan Call


   Consult Reason/Comments: acute renal failure


   Do you want consulting provider notified?: Yes





12/11/19 08:53


Consult Physician Routine 


   Consulting Provider: Chris Rusesll


   Consult Reason/Comments: Presacal mass seen on CT


   Do you want consulting provider notified?: Yes





12/11/19 08:55


Consult Physician Routine 


   Consulting Provider: Gab Meza


   Consult Reason/Comments: complex mass seen on CT


   Do you want consulting provider notified?: Yes











Primary care physician: 


Kamlesh Ramos





[facility] Course: 





Discharge diagnosis


Patient will be discharged to [facility]


Terminal diagnosis of acute hypoxic respiratory failure related to acute pn

eumonia involving bilateral lungs and acute exacerbation of chronic systolic 

heart failure





1. .  Increased shortness of breath secondary to pneumonia.  Chest x-ray 

completed showing bilateral pneumonia increased compared to old exam 

cardiomegaly and pulmonary congestion related to mild heart failure.  Pleural 

fluid improved compared to last exam pulmonary congestion unchanged.  Patient's 

Rocephin and azithromycin.  Sputum culture ordered.  DuoNeb breathing 

treatments.  Repeat chest x-ray showing stable x-ray with marked cardiomegaly, 

bilateral infiltrate and pleural effusion correlate for venous congestion 

underlying pneumonia in the differential diagnosis.  Pulmonary services are 

following





2.  Atrial fibrillation with RVR.  Cardizem drip has been DC'd.  Cardiology 

services have been consulted.  Patient does have known history of atrial 

fibrillation.  Heart rate controlled.





3.  Elevated d-dimer.  D-dimer elevated at 4.74.  Chest CTA completed showing no

evidence of pulmonary embolism.  Bilateral airspace patchy consolidation 

consistent with pneumonia.  Small right pleural effusion changes of pulmonary 

hypertension with enlargement of the pulmonary arteries.  Cardiomegaly.





4.  Abnormal troponin.  Troponin 0.044.  Cardiology services following





5.  Chronic diastolic congestive heart failure.  2-D echo completed in August 2019 showing EF of 50-55%.  BNP 6740. Patient started on IV Lasix 40 mg every 

12.  Cardiology services following





6.  History of paroxysmal atrial fibrillation.  During previous admission in 

August anticoagulation was discussed with patient and family and was decided to 

keep patient on aspirin 325 due to problems of unsteady gait and risk of falling

and episodes of anemia with refusal of GI evaluation.  





7.  History of essential hypertension





8.  History of diabetes mellitus.  Patient's home metformin dose currently on 

hold.  Sliding scale insulin coverage added





9.  History of hyperlipidemia.  Patient maintained on Lipitor





10.  Acute kidney injury, due to IV Lasix and contrast, at this time patient was

started on IV normal saline at 50 mL an hour we will monitor closely.  

Creatinine improving to 2.44 and bun 60.  Nephrology services are following





11.  Elevated liver enzymes.  Liver ultrasound completed showing small amount of

ascites.  Gallbladder sludge with no diagnostic evidence of gallbladder wall 

thickening.  There is a 2.9 cm soft to mass superior to the right kidney for 

which a computed tomography scan is suggested small right pleural effusion.  

Will order CT of abdomen with oral contrast only to further assess renal mass.





12.  Presacral mass.  Computed tomography scan completed showing a complex for 

0.7 cm mass within the presacral space containing largely fluid attenuation with

a solid mural nodule differential is broad however considerations are for 

sarcoma short dome of solitary fibrous tumor less likely development cysts given

cystic component metastatic is also possibility.  At this time oncology and 

surgical services have been consulted.  Per surgical services consider MRI of 

the patient's overall health improves await oncology services input





13.  Increased anxiety.  Xanax increased to every 4 hours when necessary





14.  Increased shortness of breath.  Case was discussed with nephrology 

services.  At this time IV Lasix will be given.  Pulmonary services updated.  

Repeat chest x-ray has been ordered per pulmonary





15.  Non-gap metabolic acidosis secondary to renal failure IV fluids have been 

switched to sodium bicarb per nephrology services





Hospital course


This is an 85-year-old female patient who presented with complaints of shortness

of breath cough and fatigue.  Patient's daughter is at bedside.  Patient speaks 

very little English.  Per patient's daughter she's been having increasing 

shortness of breath and cough over the past few days.  Patient also reports that

she's been having increased fatigue.  Patient has been coughing but unable to 

cough anything up.  Per patient daughter denies fever or chills.  Patient does 

have a past medical history of atrial fibrillation in which she is not 

maintained on anticoagulation just aspirin, congestive heart failure diabetes 

mellitus, hyperlipidemia, hypertension and chewing tobacco use.  Patient d-dimer

elevated at 4.74.  Chest CTA was completed showing no evidence of pulmonary 

embolism.  Bilateral airspace patchy consolidation consistent with pneumonia.  

Small right pleural effusion.  Changes of pulmonary hypertension with 

enlargement of the pulmonary arteries.  Cardiomegaly.  His x-ray completed 

showing bilateral pneumonia increase compared to old exam cardiomegaly and 

pulmonary congestion related to mild heart failure.  Pleural effusion fluid 

improved compared to last exam pulmonary congestion unchanged.  EKG completed 

showing atrial fibrillation with rapid ventricular response right axis deviation

and complete right bundle branch block.  BNP 6740.  Cardiology services have 

been consulted.  Pulmonary services are consulted.  Patient started on Lovenox 

and Rocephin for antibiotic sputum culture has been ordered.  At this time 

patient is resting comfortably in bed.  Daughter at bedside.  Patient stopping 

some shortness of breath with cough.  Patient denies chest pain.  Patient denies

nausea vomiting or diarrhea.  Patient denies any urinary burning or frequency.








On 12/07/2019 Patient was seen and examined, on the telemetry floor she is alert

and oriented 3 in no apparent distress, she is reporting improvement in her 

shortness of breath, there is no chest pain no fever or chills no headache or 

dizziness no chest pain no nausea or vomiting no abdominal pain no diarrhea and 

no urinary symptoms, there is worsening in her laboratory data was elevated 

white blood count to 18.4 and elevated BUN at 38 and elevated creatinine at 2.73





On 12/08/2019 patient was seen and examined on the telemetry floor, she is alert

and responsive in no apparent distress she is answering questions appropriately 

through her daughter who is translating, there is no fever or chills no headache

or dizziness no chest pain no shortness of breath no cough no nausea or vomiting

no abdominal pain no diarrhea no burning with urination no frequency or urgency 

no hematuria, kidney function worsened today with creatinine up to 3.13 IV fluid

was increased up to 75 mL an hour nephrology consult requested








On 12/09/2019 patient is currently resting comfortably in bed.  Daughter at 

bedside.  Patient's breathing has significantly improved.  Creatinine trending 

down to 2.44 and bun 60.  Patient denies chest pain or shortness of breath.  

Patient denies nausea vomiting or diarrhea.  Patient denies any urinary burning 

or frequency.  Patient remains on Rocephin and Zithromax for IV antibiotics





On 12/10/2019 patient is resting comfortably in bed.  Daughter at bedside.  

Creatinine increased slightly to 2.52.  Nephrology services are following.  

Liver ultrasound completed will order CT of abdomen based on liver ultrasound 

results continue to monitor liver enzymes.  Patient denies chest pain or 

shortness of breath.  Patient denies nausea vomiting or diarrhea.  Patient 

denies any urinary burning or frequency





On 12/11/2019 patient having increased shortness of breath and restlessness upon

examination.  Family is at bedside states patient has not been able to sleep and

having increased shortness of breath.  CT results reviewed with daughter.  At 

this time oncology and surgical services have been consulted.  Discussed case 

with Dr. Call per nephrology.  Patient will be given 1 dose of IV Lasix per 

nephrology service and started on bicarbonate drip.  Also discussed case with 

pulmonary nurse practitioner will repeat chest x-ray.





On 12/12/2019 patient continued to decline despite BiPAP and Lasix treatment.  

At this time patient and family wish to move forward with hospice care.  Family 

requesting [facility] hospice.





I performed an examination of the patient and discussed their management with 

the Nurse Practitioner.  I have reviewed the Nurse Practitioner's notes and 

agree with the documented findings and plan of care








Patient Condition at Discharge: Poor





Plan - Discharge Summary


New Discharge Prescriptions: 


No Action


   ALPRAZolam [Xanax] 0.5 mg PO HS PRN


     PRN Reason: Anxiety


   metFORMIN HCL [Glucophage] 500 mg PO BID-W/MEALS


   Ferrous Sulfate [Iron (65 MG Elemental)] 325 mg PO DAILY


   Atorvastatin [Lipitor] 10 mg PO DAILY


   Multivitamins, Thera [Multivitamin (formulary)] 1 tab PO DAILY


   Bimatoprost [Lumigan .01% Ophth Soln] 1 drop BOTH EYES HS


   Aspirin 325 mg PO DAILY 30 Days #30 tab


   Valsartan [Diovan] 40 mg PO AC-SUPPER 30 Days #30 tab


   Furosemide [Lasix] 40 mg PO DAILY 30 Days #30 tab


   Dorzolamide-Timol 2.23%/0.68% [Cosopt] 1 drop BOTH EYES BID


Discharge Medication List





ALPRAZolam [Xanax] 0.5 mg PO HS PRN 09/04/18 [History]


Atorvastatin [Lipitor] 10 mg PO DAILY 09/04/18 [History]


Ferrous Sulfate [Iron (65 MG Elemental)] 325 mg PO DAILY 09/04/18 [History]


metFORMIN HCL [Glucophage] 500 mg PO BID-W/MEALS 09/04/18 [History]


Bimatoprost [Lumigan .01% Ophth Soln] 1 drop BOTH EYES HS 08/12/19 [History]


Multivitamins, Thera [Multivitamin (formulary)] 1 tab PO DAILY 08/12/19 

[History]


Aspirin 325 mg PO DAILY 30 Days #30 tab 08/15/19 [Rx]


Furosemide [Lasix] 40 mg PO DAILY 30 Days #30 tab 08/15/19 [Rx]


Valsartan [Diovan] 40 mg PO AC-SUPPER 30 Days #30 tab 08/15/19 [Rx]


Dorzolamide-Timol 2.23%/0.68% [Cosopt] 1 drop BOTH EYES BID 12/06/19 [History]








Follow up Appointment(s)/Referral(s): 


Luanne Edwards MD [STAFF PHYSICIAN] - 12/26/19 10:30 am (Thursday)


Siobhan Call MD [STAFF PHYSICIAN] - 01/08/20 1:40 pm (Wednesday with NP)


[facility], [NON-STAFF] - 


Cesar Polo MD [STAFF PHYSICIAN] - 12/23/19 1:15 pm (Monday)


Kamlesh Ramos MD [Primary Care Provider] - 12/13/19 9:45 am (Friday)


Patient Instructions/Handouts:  Acute Kidney Injury (DC), Pneumonia (DC)


Discharge Disposition: DC/TRNS IP HOSP W/PLND IP READ

## 2019-12-12 NOTE — XR
EXAMINATION TYPE: XR chest 1V portable

 

DATE OF EXAM: 12/12/2019

 

COMPARISON: 12/11/2019

 

HISTORY: Shortness of breath

 

TECHNIQUE: Single frontal view of the chest is obtained.

 

FINDINGS:  Cardiomegaly with diffuse interstitial pattern and bilateral consolidation and pleural eff
usion. No pneumothorax. Enlargement of the left perihilar region is noted.

 

IMPRESSION:  

1. Correlate for CHF. Lower lobe pneumonia not excluded.

2. Marked enlargement of the left cardiac silhouette appears to represent a severely dilated pulmonar
y artery by recent CT scan. Correlate for pulmonary arterial hypertension.

## 2019-12-12 NOTE — PN
PROGRESS NOTE



Patient is seen for followup for acute kidney injury.  She was seen this morning.

Patient has been unresponsive and quite lethargic today.  Family is present at bedside

and they want to take her home with home hospice.



PHYSICAL EXAMINATION:

On examination this morning, blood pressure was 88/60, heart rate 94 per minute.

Patient is afebrile.

EXAMINATION OF THE HEART: S1 and S2.

EXAMINATION OF LUNGS: Decreased breath sounds at bases.

ABDOMEN:  Soft, non-tender.

Examination of lower extremities shows no significant edema.



LABS:

Hemoglobin of 10.9, sodium 136, potassium 5.0, BUN 76, creatinine 3.17.  Lactic acid

yesterday was 7.4.



ASSESSMENT:

1. Acute kidney injury, acute tubular necrosis, currently nonoliguric.  Renal function

    is worsening.  Patient is not a candidate for renal replacement therapy.  This was

    discussed with the family, and they want to take her home with home hospice.

2. Presacral mass, etiology unknown.  No plans for biopsy and further investigation.

3. Lactic acidosis, maintained on IV bicarb.  Patient could not receive large amounts

    of IV fluids secondary to volume overload and CHF.



PLAN:

Agree with home hospice. Not a candidate for dialysis.





MMODL / IJN: [id number] / Job#: [id number]

## 2019-12-13 VITALS — RESPIRATION RATE: 18 BRPM

## 2019-12-13 VITALS — HEART RATE: 69 BPM

## 2019-12-13 RX ADMIN — SCOPALAMINE SCH PATCH: 1 PATCH, EXTENDED RELEASE TRANSDERMAL at 03:52

## 2019-12-13 RX ADMIN — IPRATROPIUM BROMIDE AND ALBUTEROL SULFATE SCH: .5; 3 SOLUTION RESPIRATORY (INHALATION) at 07:45

## 2019-12-13 RX ADMIN — MORPHINE SULFATE PRN MG: 2 INJECTION, SOLUTION INTRAMUSCULAR; INTRAVENOUS at 13:54

## 2019-12-13 NOTE — P.DS
Providers


Date of admission: 


12/12/19 11:42





Expected date of discharge: 12/13/19


Attending physician: 


Kamlesh Ramos





Primary care physician: 


Kamlesh Ramos





Hospital Course: 





Discharge diagnoses








1.  Acute sepsis  likely related to pneumonia





2.  Acute hypoxic respiratory failure related to acute pneumonia involving 

bilateral lungs and acute exacerbation of diastolic heart failure





3.  Chronic diastolic heart failure





4.  Metabolic acidosis related to sepsis





5. acute kidney injury





6.  History of chronic atrial fibrillation not on anticoagulation due to falls





7.  Elevated d-dimer.  D-dimer elevated at 4.74.  Chest CTA completed showing no

evidence of pulmonary embolism.  Bilateral airspace patchy consolidation 

consistent with pneumonia.  Small right pleural effusion changes of pulmonary 

hypertension with enlargement of the pulmonary arteries.  Cardiomegaly.





8.  Abnormal troponin.  Troponin 0.044.  Cardiology services following





9.  History of essential hypertension





10.  History of diabetes mellitus.  





11.  History of hyperlipidemia. 





12.  Elevated liver enzymes likely secondary to congestive hepatopathy





13.  Presacral mass.  Computed tomography scan completed showing a complex for 

0.7 cm mass within the presacral space containing largely fluid attenuation with

a solid mural nodule differential is broad however considerations are for 

sarcoma short dome of solitary fibrous tumor less likely development cysts given

cystic component metastatic is also possibility.  Family declined any further 

workup at this time








Hospital course





terminal diagnosis of acute hypoxic respiratory failure related to acute 

pneumonia involving bilateral lungs and acute exacerbation of chronic systolic 

heart failure and sepsis





This is an 85-year-old female patient who was originally admitted with shortness

of breath.  Patient was found to have pneumonia and acute exacerbation of CHF.  

Patient was treated with  Lasix and antibiotics.  Patient developed acute kidney

failure.  Patient progressively started to decline despite medical treatment 

with increased Lasix and BiPAP support. family made the decision to proceed with

hospice care.  Patient currently admitted as general inpatient under [facility] 

hospice plans for possible discharge home tomorrow.





on 12/12/2019 multiple family members at bedside.  Patient is now resting 

comfortably in bed. appears does not appear in acute distress.  Morphine and 

Ativan when necessary.   possible plans for discharge home tomorrow with home 

hospice care.





On 12/13/2019 patient is resting comfortably in bed in no distress.  Family 

requesting patient be discharged home with home hospice at this time.  Discussed

case with [facility] hospice equipment has been ordered for home.  Patient is

comfortable at this time.





I performed an examination of the patient and discussed their management with 

the Nurse Practitioner.  I have reviewed the Nurse Practitioner's notes and 

agree with the documented findings and plan of care


Patient Condition at Discharge: Stable





Plan - Discharge Summary


New Discharge Prescriptions: 


No Action


   RX: ALPRAZolam [Xanax] 0.5 mg PO HS PRN


     PRN Reason: Anxiety


   RX: metFORMIN HCL [Glucophage] 500 mg PO BID-W/MEALS


   RX: Ferrous Sulfate [Iron (65 MG Elemental)] 325 mg PO DAILY


   RX: Atorvastatin [Lipitor] 10 mg PO DAILY


   RX: Multivitamins, Thera [Multivitamin (formulary)] 1 tab PO DAILY


   RX: Bimatoprost [Lumigan .01% Ophth Soln] 1 drop BOTH EYES HS


   RX: Aspirin 325 mg PO DAILY 30 Days #30 tab


   RX: Valsartan [Diovan] 40 mg PO AC-SUPPER 30 Days #30 tab


   RX: Furosemide [Lasix] 40 mg PO DAILY 30 Days #30 tab


   RX: Dorzolamide-Timol 2.23%/0.68% [Cosopt] 1 drop BOTH EYES BID


Discharge Medication List





RX: ALPRAZolam [Xanax] 0.5 mg PO HS PRN 09/04/18 [History]


RX: Atorvastatin [Lipitor] 10 mg PO DAILY 09/04/18 [History]


RX: Ferrous Sulfate [Iron (65 MG Elemental)] 325 mg PO DAILY 09/04/18 [History]


RX: metFORMIN HCL [Glucophage] 500 mg PO BID-W/MEALS 09/04/18 [History]


RX: Bimatoprost [Lumigan .01% Ophth Soln] 1 drop BOTH EYES HS 08/12/19 [History]


RX: Multivitamins, Thera [Multivitamin (formulary)] 1 tab PO DAILY 08/12/19 

[History]


RX: Aspirin 325 mg PO DAILY 30 Days #30 tab 08/15/19 [Rx]


RX: Furosemide [Lasix] 40 mg PO DAILY 30 Days #30 tab 08/15/19 [Rx]


RX: Valsartan [Diovan] 40 mg PO AC-SUPPER 30 Days #30 tab 08/15/19 [Rx]


RX: Dorzolamide-Timol 2.23%/0.68% [Cosopt] 1 drop BOTH EYES BID 12/06/19 

[History]

## 2019-12-16 NOTE — CDI
Documentation Clarification Form



Date: 12/16/19

From: Zeenat Ortega

Phone: If you have a question about this query, please contact Elaine Min 
 at [phone] between 8am and 5pm.

Admit Date: 12/6/19

Discharge Date:12/12/19 

Patient Name: Beryl Harrell

Visit Number: [id number]



ATTENTION: The Clinical Documentation Specialists (CDI) and [facility] Coding Staff 
appreciate your assistance in clarifying documentation. Please respond to the 
clarification below the line at the bottom and electronically sign. The CDI & 
[facility] Coding staff will review the response and follow-up if needed. Please note: 
Queries are made part of the Legal Health Record. If you have any questions, 
please contact the author of this message via ITS.



Dear Rachel Diaz



The patient presented with bilateral pneumonia and acute respiratory failure. 
Acute mental status change, likely related to sepsis and metabolic 
encephalopathy related to sepsis is documented in Dr. Lui's 12/11 progress 
note.

History/Risk Factors:  Pneumonia 

Clinical Indicators: elevated white count, metabolic encephalopathy, 
hypotension, elevated lactic acid

WBC: 12/6 - 10.4, 12/7 - 18.4

Lactic acid: 12/11 - 7.1 and 7.4

Blood cultures: No growth

Vitals signs on admission:  T. 98.5, P. 110, R. 34

Other Clinical Indicators: /96 on admit, 99/60 on 12/6

Treatment: IV Rocephin, PO Zithromax and IV Zithromax started on 12/12

IV Bolus:  1 liter on 12/11



In your professional opinion, please clarify if these findings signify one of 
the following conditions, whether the condition is POA, and cause, if known:

Condition

   Sepsis ruled out

   SIRS, without underlying infectious process

   Sepsis

   Severe Sepsis 

   Septic Shock

   Other, please specify  _____________

   Unable to determine

Present on Admission

   Yes

   No

Identify the (suspected) organism______________

Link or clarify if there is associated (due to/with): 

     Organ failure

     Shock

___________________________________________________________________________

 Sepsis present on admission 

MTDD

## 2023-12-26 NOTE — US
EXAMINATION TYPE: US liver

 

DATE OF EXAM: 12/10/2019

 

COMPARISON: US, CT chest

 

CLINICAL HISTORY: elevated liver enzymes; on meds for HTN, diabetes, pneumonia

 

EXAM MEASUREMENTS:

 

Liver Length:  14.2 cm   

Gallbladder Wall:  0.2 cm   

CBD:  0.2 cm

Right Kidney:  9.6 x 5.5 x 3.1 cm

 

 

 

Pancreas:  wnl

Liver:  wnl ; small amount of ascites seen superior to liver 

Gallbladder:  possible small amount of sludge noted in the dependent portion vs. echogenic bile

**Evidence for sonographic Rivera's sign:  no

CBD:  wnl 

Right Kidney:  couple of small cortical cysts in the lower pole. Inferomedial to right kidney is oval
 mass = 2.9 x 1.9 x 1.8cm which may be lymph node or right ovary.

Right pleural effusion is seen.

 

 

IMPRESSION: 

1. Small amount ascites.

2. Gallbladder sludge with no diagnostic evidence of gallbladder wall thickening.

3. There is a 2.9 cm soft tissue mass inferior to the right kidney for which a CT scan is suggested.

4. Small right pleural effusion show